# Patient Record
Sex: MALE | Race: WHITE | Employment: UNEMPLOYED | URBAN - METROPOLITAN AREA
[De-identification: names, ages, dates, MRNs, and addresses within clinical notes are randomized per-mention and may not be internally consistent; named-entity substitution may affect disease eponyms.]

---

## 2018-12-09 ENCOUNTER — HOSPITAL ENCOUNTER (INPATIENT)
Age: 58
LOS: 17 days | Discharge: HOME HEALTH | DRG: 253 | End: 2018-12-27
Attending: EMERGENCY MEDICINE | Admitting: INTERNAL MEDICINE
Payer: MEDICARE

## 2018-12-09 DIAGNOSIS — R00.0 TACHYCARDIA: ICD-10-CM

## 2018-12-09 DIAGNOSIS — F15.10 AMPHETAMINE ABUSE (HCC): ICD-10-CM

## 2018-12-09 DIAGNOSIS — L08.9 WOUND INFECTION: Primary | ICD-10-CM

## 2018-12-09 DIAGNOSIS — T14.8XXA WOUND INFECTION: Primary | ICD-10-CM

## 2018-12-09 DIAGNOSIS — R77.8 ELEVATED TROPONIN: ICD-10-CM

## 2018-12-09 DIAGNOSIS — B20 HUMAN IMMUNODEFICIENCY VIRUS (HIV) DISEASE (HCC): ICD-10-CM

## 2018-12-09 LAB
BASOPHILS ABSOLUTE: 0.1 K/UL (ref 0–0.2)
BASOPHILS RELATIVE PERCENT: 1.6 %
EOSINOPHILS ABSOLUTE: 0 K/UL (ref 0–0.6)
EOSINOPHILS RELATIVE PERCENT: 0.6 %
HCT VFR BLD CALC: 35.9 % (ref 40.5–52.5)
HEMOGLOBIN: 11.5 G/DL (ref 13.5–17.5)
LACTIC ACID: 1.7 MMOL/L (ref 0.4–2)
LYMPHOCYTES ABSOLUTE: 1 K/UL (ref 1–5.1)
LYMPHOCYTES RELATIVE PERCENT: 13 %
MCH RBC QN AUTO: 26.4 PG (ref 26–34)
MCHC RBC AUTO-ENTMCNC: 32.1 G/DL (ref 31–36)
MCV RBC AUTO: 82.3 FL (ref 80–100)
MONOCYTES ABSOLUTE: 0.4 K/UL (ref 0–1.3)
MONOCYTES RELATIVE PERCENT: 5.6 %
NEUTROPHILS ABSOLUTE: 6.2 K/UL (ref 1.7–7.7)
NEUTROPHILS RELATIVE PERCENT: 79.2 %
PDW BLD-RTO: 20.1 % (ref 12.4–15.4)
PLATELET # BLD: 202 K/UL (ref 135–450)
PMV BLD AUTO: 7.3 FL (ref 5–10.5)
RBC # BLD: 4.37 M/UL (ref 4.2–5.9)
WBC # BLD: 7.8 K/UL (ref 4–11)

## 2018-12-09 PROCEDURE — 87040 BLOOD CULTURE FOR BACTERIA: CPT

## 2018-12-09 PROCEDURE — 84484 ASSAY OF TROPONIN QUANT: CPT

## 2018-12-09 PROCEDURE — 85025 COMPLETE CBC W/AUTO DIFF WBC: CPT

## 2018-12-09 PROCEDURE — 87086 URINE CULTURE/COLONY COUNT: CPT

## 2018-12-09 PROCEDURE — 83880 ASSAY OF NATRIURETIC PEPTIDE: CPT

## 2018-12-09 PROCEDURE — 96361 HYDRATE IV INFUSION ADD-ON: CPT

## 2018-12-09 PROCEDURE — 83605 ASSAY OF LACTIC ACID: CPT

## 2018-12-09 PROCEDURE — 6370000000 HC RX 637 (ALT 250 FOR IP): Performed by: NURSE PRACTITIONER

## 2018-12-09 PROCEDURE — 93005 ELECTROCARDIOGRAM TRACING: CPT | Performed by: NURSE PRACTITIONER

## 2018-12-09 PROCEDURE — 80053 COMPREHEN METABOLIC PANEL: CPT

## 2018-12-09 PROCEDURE — 2580000003 HC RX 258: Performed by: NURSE PRACTITIONER

## 2018-12-09 PROCEDURE — 99285 EMERGENCY DEPT VISIT HI MDM: CPT

## 2018-12-09 RX ORDER — 0.9 % SODIUM CHLORIDE 0.9 %
1000 INTRAVENOUS SOLUTION INTRAVENOUS ONCE
Status: COMPLETED | OUTPATIENT
Start: 2018-12-10 | End: 2018-12-10

## 2018-12-09 RX ORDER — 0.9 % SODIUM CHLORIDE 0.9 %
1000 INTRAVENOUS SOLUTION INTRAVENOUS ONCE
Status: DISCONTINUED | OUTPATIENT
Start: 2018-12-09 | End: 2018-12-09

## 2018-12-09 RX ORDER — PROMETHAZINE HYDROCHLORIDE 25 MG/1
TABLET ORAL
COMMUNITY
Start: 2018-08-13

## 2018-12-09 RX ORDER — ZOLPIDEM TARTRATE 10 MG/1
10 TABLET ORAL NIGHTLY PRN
Status: ON HOLD | COMMUNITY
Start: 2017-11-01 | End: 2019-02-14 | Stop reason: HOSPADM

## 2018-12-09 RX ORDER — TRAMADOL HYDROCHLORIDE 50 MG/1
50 TABLET ORAL EVERY 6 HOURS PRN
Status: ON HOLD | COMMUNITY
Start: 2017-11-01 | End: 2018-12-27 | Stop reason: HOSPADM

## 2018-12-09 RX ORDER — LOPERAMIDE HYDROCHLORIDE 2 MG/1
CAPSULE ORAL
COMMUNITY
Start: 2018-08-13

## 2018-12-09 RX ORDER — 0.9 % SODIUM CHLORIDE 0.9 %
1000 INTRAVENOUS SOLUTION INTRAVENOUS ONCE
Status: COMPLETED | OUTPATIENT
Start: 2018-12-09 | End: 2018-12-10

## 2018-12-09 RX ORDER — MONTELUKAST SODIUM 10 MG/1
10 TABLET ORAL NIGHTLY
Status: ON HOLD | COMMUNITY
Start: 2017-09-11 | End: 2019-02-14

## 2018-12-09 RX ORDER — NYSTATIN 100000 U/G
CREAM TOPICAL
COMMUNITY
Start: 2018-07-09

## 2018-12-09 RX ORDER — ATORVASTATIN CALCIUM 80 MG/1
TABLET, FILM COATED ORAL
Status: ON HOLD | COMMUNITY
Start: 2018-08-13 | End: 2019-02-14

## 2018-12-09 RX ORDER — SIMETHICONE 125 MG
125 CAPSULE ORAL 4 TIMES DAILY PRN
COMMUNITY
Start: 2017-11-01

## 2018-12-09 RX ORDER — CLONAZEPAM 1 MG/1
1 TABLET ORAL 3 TIMES DAILY PRN
COMMUNITY
Start: 2017-11-01 | End: 2019-02-05

## 2018-12-09 RX ORDER — ABACAVIR AND LAMIVUDINE 600; 300 MG/1; MG/1
1 TABLET, FILM COATED ORAL DAILY
Status: ON HOLD | COMMUNITY
Start: 2018-08-13 | End: 2018-12-27 | Stop reason: HOSPADM

## 2018-12-09 RX ORDER — MIRTAZAPINE 45 MG/1
45 TABLET, FILM COATED ORAL NIGHTLY
Status: ON HOLD | COMMUNITY
Start: 2018-07-30 | End: 2019-02-14

## 2018-12-09 RX ORDER — INSULIN GLARGINE 100 [IU]/ML
12 INJECTION, SOLUTION SUBCUTANEOUS DAILY
Status: ON HOLD | COMMUNITY
Start: 2018-09-28 | End: 2019-02-14

## 2018-12-09 RX ORDER — LIDOCAINE 50 MG/G
OINTMENT TOPICAL
COMMUNITY
Start: 2017-05-01

## 2018-12-09 RX ORDER — PANTOPRAZOLE SODIUM 40 MG/1
40 TABLET, DELAYED RELEASE ORAL DAILY
COMMUNITY
Start: 2017-09-11

## 2018-12-09 RX ORDER — LISINOPRIL 40 MG/1
40 TABLET ORAL
Status: ON HOLD | COMMUNITY
Start: 2017-07-25 | End: 2018-12-27 | Stop reason: HOSPADM

## 2018-12-09 RX ORDER — ALENDRONATE SODIUM 70 MG/1
TABLET ORAL
COMMUNITY
Start: 2018-03-04

## 2018-12-09 RX ORDER — METOPROLOL TARTRATE 100 MG/1
100 TABLET ORAL
Status: ON HOLD | COMMUNITY
Start: 2017-09-11 | End: 2018-12-27 | Stop reason: HOSPADM

## 2018-12-09 RX ORDER — FLUTICASONE PROPIONATE 50 MCG
SPRAY, SUSPENSION (ML) NASAL
Status: ON HOLD | COMMUNITY
Start: 2017-12-05 | End: 2019-02-14

## 2018-12-09 RX ORDER — METOPROLOL TARTRATE 50 MG/1
100 TABLET, FILM COATED ORAL ONCE
Status: COMPLETED | OUTPATIENT
Start: 2018-12-09 | End: 2018-12-09

## 2018-12-09 RX ORDER — ARIPIPRAZOLE 2 MG/1
2 TABLET ORAL NIGHTLY
Status: ON HOLD | COMMUNITY
Start: 2018-07-30 | End: 2019-02-14

## 2018-12-09 RX ORDER — ASPIRIN 81 MG/1
81 TABLET, CHEWABLE ORAL DAILY
Status: ON HOLD | COMMUNITY
Start: 2016-11-08 | End: 2019-02-14

## 2018-12-09 RX ADMIN — METOPROLOL TARTRATE 100 MG: 50 TABLET ORAL at 23:41

## 2018-12-09 RX ADMIN — SODIUM CHLORIDE 1000 ML: 9 INJECTION, SOLUTION INTRAVENOUS at 23:41

## 2018-12-09 ASSESSMENT — ENCOUNTER SYMPTOMS
COLOR CHANGE: 1
SHORTNESS OF BREATH: 0
ABDOMINAL PAIN: 0

## 2018-12-09 ASSESSMENT — PAIN DESCRIPTION - PAIN TYPE: TYPE: ACUTE PAIN

## 2018-12-09 ASSESSMENT — PAIN DESCRIPTION - ORIENTATION: ORIENTATION: LEFT

## 2018-12-09 ASSESSMENT — PAIN DESCRIPTION - LOCATION: LOCATION: LEG

## 2018-12-09 ASSESSMENT — PAIN SCALES - GENERAL: PAINLEVEL_OUTOF10: 4

## 2018-12-10 ENCOUNTER — ANESTHESIA (OUTPATIENT)
Dept: OPERATING ROOM | Age: 58
DRG: 253 | End: 2018-12-10
Payer: MEDICARE

## 2018-12-10 ENCOUNTER — APPOINTMENT (OUTPATIENT)
Dept: GENERAL RADIOLOGY | Age: 58
DRG: 253 | End: 2018-12-10
Payer: MEDICARE

## 2018-12-10 ENCOUNTER — ANESTHESIA EVENT (OUTPATIENT)
Dept: OPERATING ROOM | Age: 58
DRG: 253 | End: 2018-12-10
Payer: MEDICARE

## 2018-12-10 VITALS
RESPIRATION RATE: 17 BRPM | DIASTOLIC BLOOD PRESSURE: 67 MMHG | OXYGEN SATURATION: 96 % | SYSTOLIC BLOOD PRESSURE: 100 MMHG

## 2018-12-10 PROBLEM — S81.802A WOUNDS, MULTIPLE OPEN, LOWER EXTREMITY, LEFT, INITIAL ENCOUNTER: Status: ACTIVE | Noted: 2018-12-10

## 2018-12-10 LAB
A/G RATIO: 0.8 (ref 1.1–2.2)
ALBUMIN SERPL-MCNC: 3 G/DL (ref 3.4–5)
ALBUMIN SERPL-MCNC: 3.1 G/DL (ref 3.4–5)
ALP BLD-CCNC: 119 U/L (ref 40–129)
ALP BLD-CCNC: 119 U/L (ref 40–129)
ALT SERPL-CCNC: 239 U/L (ref 10–40)
ALT SERPL-CCNC: 274 U/L (ref 10–40)
AMPHETAMINE SCREEN, URINE: POSITIVE
ANION GAP SERPL CALCULATED.3IONS-SCNC: 14 MMOL/L (ref 3–16)
ANION GAP SERPL CALCULATED.3IONS-SCNC: 16 MMOL/L (ref 3–16)
AST SERPL-CCNC: 116 U/L (ref 15–37)
AST SERPL-CCNC: 145 U/L (ref 15–37)
BARBITURATE SCREEN URINE: ABNORMAL
BASOPHILS ABSOLUTE: 0 K/UL (ref 0–0.2)
BASOPHILS RELATIVE PERCENT: 0.4 %
BENZODIAZEPINE SCREEN, URINE: ABNORMAL
BILIRUB SERPL-MCNC: 1.7 MG/DL (ref 0–1)
BILIRUB SERPL-MCNC: 1.9 MG/DL (ref 0–1)
BILIRUBIN DIRECT: 1.1 MG/DL (ref 0–0.3)
BILIRUBIN URINE: ABNORMAL
BILIRUBIN, INDIRECT: 0.6 MG/DL (ref 0–1)
BLOOD, URINE: ABNORMAL
BUN BLDV-MCNC: 25 MG/DL (ref 7–20)
BUN BLDV-MCNC: 26 MG/DL (ref 7–20)
CALCIUM SERPL-MCNC: 7.9 MG/DL (ref 8.3–10.6)
CALCIUM SERPL-MCNC: 8.5 MG/DL (ref 8.3–10.6)
CANNABINOID SCREEN URINE: ABNORMAL
CHLORIDE BLD-SCNC: 101 MMOL/L (ref 99–110)
CHLORIDE BLD-SCNC: 98 MMOL/L (ref 99–110)
CLARITY: CLEAR
CO2: 24 MMOL/L (ref 21–32)
CO2: 26 MMOL/L (ref 21–32)
COCAINE METABOLITE SCREEN URINE: ABNORMAL
COLOR: ABNORMAL
COMMENT UA: ABNORMAL
CREAT SERPL-MCNC: 1.1 MG/DL (ref 0.9–1.3)
CREAT SERPL-MCNC: 1.2 MG/DL (ref 0.9–1.3)
EKG ATRIAL RATE: 150 BPM
EKG ATRIAL RATE: 278 BPM
EKG DIAGNOSIS: NORMAL
EKG DIAGNOSIS: NORMAL
EKG Q-T INTERVAL: 330 MS
EKG Q-T INTERVAL: 372 MS
EKG QRS DURATION: 86 MS
EKG QRS DURATION: 94 MS
EKG QTC CALCULATION (BAZETT): 505 MS
EKG QTC CALCULATION (BAZETT): 525 MS
EKG R AXIS: -15 DEGREES
EKG R AXIS: -84 DEGREES
EKG T AXIS: 102 DEGREES
EKG T AXIS: 111 DEGREES
EKG VENTRICULAR RATE: 120 BPM
EKG VENTRICULAR RATE: 141 BPM
EOSINOPHILS ABSOLUTE: 0 K/UL (ref 0–0.6)
EOSINOPHILS RELATIVE PERCENT: 0.4 %
EPITHELIAL CELLS, UA: ABNORMAL /HPF
GFR AFRICAN AMERICAN: >60
GFR AFRICAN AMERICAN: >60
GFR NON-AFRICAN AMERICAN: >60
GFR NON-AFRICAN AMERICAN: >60
GLOBULIN: 3.9 G/DL
GLUCOSE BLD-MCNC: 139 MG/DL (ref 70–99)
GLUCOSE BLD-MCNC: 145 MG/DL (ref 70–99)
GLUCOSE BLD-MCNC: 153 MG/DL (ref 70–99)
GLUCOSE BLD-MCNC: 156 MG/DL (ref 70–99)
GLUCOSE BLD-MCNC: 180 MG/DL (ref 70–99)
GLUCOSE BLD-MCNC: 195 MG/DL (ref 70–99)
GLUCOSE BLD-MCNC: 229 MG/DL (ref 70–99)
GLUCOSE URINE: NEGATIVE MG/DL
HCT VFR BLD CALC: 35.6 % (ref 40.5–52.5)
HEMOGLOBIN: 11.3 G/DL (ref 13.5–17.5)
KETONES, URINE: NEGATIVE MG/DL
LEUKOCYTE ESTERASE, URINE: ABNORMAL
LYMPHOCYTES ABSOLUTE: 0.9 K/UL (ref 1–5.1)
LYMPHOCYTES RELATIVE PERCENT: 11.1 %
Lab: ABNORMAL
MAGNESIUM: 1.7 MG/DL (ref 1.8–2.4)
MCH RBC QN AUTO: 26.4 PG (ref 26–34)
MCHC RBC AUTO-ENTMCNC: 31.8 G/DL (ref 31–36)
MCV RBC AUTO: 83 FL (ref 80–100)
METHADONE SCREEN, URINE: ABNORMAL
MICROSCOPIC EXAMINATION: YES
MONOCYTES ABSOLUTE: 0.5 K/UL (ref 0–1.3)
MONOCYTES RELATIVE PERCENT: 6.1 %
NEUTROPHILS ABSOLUTE: 6.8 K/UL (ref 1.7–7.7)
NEUTROPHILS RELATIVE PERCENT: 82 %
NITRITE, URINE: NEGATIVE
OPIATE SCREEN URINE: ABNORMAL
OXYCODONE URINE: ABNORMAL
PDW BLD-RTO: 20 % (ref 12.4–15.4)
PERFORMED ON: ABNORMAL
PH UA: 6
PH UA: 6
PHENCYCLIDINE SCREEN URINE: ABNORMAL
PHOSPHORUS: 2.4 MG/DL (ref 2.5–4.9)
PLATELET # BLD: 186 K/UL (ref 135–450)
PMV BLD AUTO: 7.3 FL (ref 5–10.5)
POTASSIUM REFLEX MAGNESIUM: 3.6 MMOL/L (ref 3.5–5.1)
POTASSIUM SERPL-SCNC: 3.6 MMOL/L (ref 3.5–5.1)
PRO-BNP: ABNORMAL PG/ML (ref 0–124)
PROPOXYPHENE SCREEN: ABNORMAL
PROTEIN UA: 100 MG/DL
RBC # BLD: 4.3 M/UL (ref 4.2–5.9)
RBC UA: ABNORMAL /HPF (ref 0–2)
SODIUM BLD-SCNC: 138 MMOL/L (ref 136–145)
SODIUM BLD-SCNC: 141 MMOL/L (ref 136–145)
SPECIFIC GRAVITY UA: 1.02
TOTAL PROTEIN: 6.3 G/DL (ref 6.4–8.2)
TOTAL PROTEIN: 7 G/DL (ref 6.4–8.2)
TROPONIN: 0.07 NG/ML
TROPONIN: 0.08 NG/ML
TROPONIN: 0.08 NG/ML
URINE REFLEX TO CULTURE: YES
URINE TYPE: ABNORMAL
UROBILINOGEN, URINE: 2 E.U./DL
WBC # BLD: 8.3 K/UL (ref 4–11)
WBC UA: ABNORMAL /HPF (ref 0–5)

## 2018-12-10 PROCEDURE — 97162 PT EVAL MOD COMPLEX 30 MIN: CPT

## 2018-12-10 PROCEDURE — 3700000000 HC ANESTHESIA ATTENDED CARE: Performed by: SURGERY

## 2018-12-10 PROCEDURE — 99222 1ST HOSP IP/OBS MODERATE 55: CPT | Performed by: NURSE PRACTITIONER

## 2018-12-10 PROCEDURE — 2709999900 HC NON-CHARGEABLE SUPPLY: Performed by: SURGERY

## 2018-12-10 PROCEDURE — 6370000000 HC RX 637 (ALT 250 FOR IP): Performed by: NURSE PRACTITIONER

## 2018-12-10 PROCEDURE — 84484 ASSAY OF TROPONIN QUANT: CPT

## 2018-12-10 PROCEDURE — 2580000003 HC RX 258: Performed by: SURGERY

## 2018-12-10 PROCEDURE — 93010 ELECTROCARDIOGRAM REPORT: CPT | Performed by: INTERNAL MEDICINE

## 2018-12-10 PROCEDURE — 96366 THER/PROPH/DIAG IV INF ADDON: CPT

## 2018-12-10 PROCEDURE — 87205 SMEAR GRAM STAIN: CPT

## 2018-12-10 PROCEDURE — 87076 CULTURE ANAEROBE IDENT EACH: CPT

## 2018-12-10 PROCEDURE — 88304 TISSUE EXAM BY PATHOLOGIST: CPT

## 2018-12-10 PROCEDURE — 96368 THER/DIAG CONCURRENT INF: CPT

## 2018-12-10 PROCEDURE — G8988 SELF CARE GOAL STATUS: HCPCS

## 2018-12-10 PROCEDURE — 6360000002 HC RX W HCPCS: Performed by: INTERNAL MEDICINE

## 2018-12-10 PROCEDURE — 11046 DBRDMT MUSC&/FSCA EA ADDL: CPT | Performed by: SURGERY

## 2018-12-10 PROCEDURE — 6370000000 HC RX 637 (ALT 250 FOR IP): Performed by: INTERNAL MEDICINE

## 2018-12-10 PROCEDURE — 80048 BASIC METABOLIC PNL TOTAL CA: CPT

## 2018-12-10 PROCEDURE — 87176 TISSUE HOMOGENIZATION CULTR: CPT

## 2018-12-10 PROCEDURE — 99223 1ST HOSP IP/OBS HIGH 75: CPT | Performed by: INTERNAL MEDICINE

## 2018-12-10 PROCEDURE — 81001 URINALYSIS AUTO W/SCOPE: CPT

## 2018-12-10 PROCEDURE — 2580000003 HC RX 258: Performed by: INTERNAL MEDICINE

## 2018-12-10 PROCEDURE — 80076 HEPATIC FUNCTION PANEL: CPT

## 2018-12-10 PROCEDURE — 97166 OT EVAL MOD COMPLEX 45 MIN: CPT

## 2018-12-10 PROCEDURE — 11043 DBRDMT MUSC&/FSCA 1ST 20/<: CPT | Performed by: SURGERY

## 2018-12-10 PROCEDURE — 3700000001 HC ADD 15 MINUTES (ANESTHESIA): Performed by: SURGERY

## 2018-12-10 PROCEDURE — 85025 COMPLETE CBC W/AUTO DIFF WBC: CPT

## 2018-12-10 PROCEDURE — 87070 CULTURE OTHR SPECIMN AEROBIC: CPT

## 2018-12-10 PROCEDURE — 6360000002 HC RX W HCPCS

## 2018-12-10 PROCEDURE — 73590 X-RAY EXAM OF LOWER LEG: CPT

## 2018-12-10 PROCEDURE — 2580000003 HC RX 258

## 2018-12-10 PROCEDURE — 87186 SC STD MICRODIL/AGAR DIL: CPT

## 2018-12-10 PROCEDURE — 94760 N-INVAS EAR/PLS OXIMETRY 1: CPT

## 2018-12-10 PROCEDURE — 0LBP0ZZ EXCISION OF LEFT LOWER LEG TENDON, OPEN APPROACH: ICD-10-PCS | Performed by: SURGERY

## 2018-12-10 PROCEDURE — 93005 ELECTROCARDIOGRAM TRACING: CPT | Performed by: INTERNAL MEDICINE

## 2018-12-10 PROCEDURE — G8978 MOBILITY CURRENT STATUS: HCPCS

## 2018-12-10 PROCEDURE — APPNB30 APP NON BILLABLE TIME 0-30 MINS: Performed by: NURSE PRACTITIONER

## 2018-12-10 PROCEDURE — 83735 ASSAY OF MAGNESIUM: CPT

## 2018-12-10 PROCEDURE — 2580000003 HC RX 258: Performed by: NURSE PRACTITIONER

## 2018-12-10 PROCEDURE — 6360000002 HC RX W HCPCS: Performed by: NURSE PRACTITIONER

## 2018-12-10 PROCEDURE — 7100000000 HC PACU RECOVERY - FIRST 15 MIN: Performed by: SURGERY

## 2018-12-10 PROCEDURE — 7100000001 HC PACU RECOVERY - ADDTL 15 MIN: Performed by: SURGERY

## 2018-12-10 PROCEDURE — 36415 COLL VENOUS BLD VENIPUNCTURE: CPT

## 2018-12-10 PROCEDURE — 86403 PARTICLE AGGLUT ANTBDY SCRN: CPT

## 2018-12-10 PROCEDURE — 97530 THERAPEUTIC ACTIVITIES: CPT

## 2018-12-10 PROCEDURE — 80307 DRUG TEST PRSMV CHEM ANLYZR: CPT

## 2018-12-10 PROCEDURE — G8987 SELF CARE CURRENT STATUS: HCPCS

## 2018-12-10 PROCEDURE — G8979 MOBILITY GOAL STATUS: HCPCS

## 2018-12-10 PROCEDURE — 3600000012 HC SURGERY LEVEL 2 ADDTL 15MIN: Performed by: SURGERY

## 2018-12-10 PROCEDURE — 96365 THER/PROPH/DIAG IV INF INIT: CPT

## 2018-12-10 PROCEDURE — 2500000003 HC RX 250 WO HCPCS

## 2018-12-10 PROCEDURE — 87077 CULTURE AEROBIC IDENTIFY: CPT

## 2018-12-10 PROCEDURE — 1200000000 HC SEMI PRIVATE

## 2018-12-10 PROCEDURE — 84100 ASSAY OF PHOSPHORUS: CPT

## 2018-12-10 PROCEDURE — 97116 GAIT TRAINING THERAPY: CPT

## 2018-12-10 PROCEDURE — 3600000002 HC SURGERY LEVEL 2 BASE: Performed by: SURGERY

## 2018-12-10 RX ORDER — ASPIRIN 81 MG/1
324 TABLET, CHEWABLE ORAL ONCE
Status: COMPLETED | OUTPATIENT
Start: 2018-12-10 | End: 2018-12-10

## 2018-12-10 RX ORDER — ONDANSETRON 2 MG/ML
4 INJECTION INTRAMUSCULAR; INTRAVENOUS
Status: DISCONTINUED | OUTPATIENT
Start: 2018-12-10 | End: 2018-12-10 | Stop reason: HOSPADM

## 2018-12-10 RX ORDER — ETOMIDATE 2 MG/ML
INJECTION INTRAVENOUS PRN
Status: DISCONTINUED | OUTPATIENT
Start: 2018-12-10 | End: 2018-12-10 | Stop reason: SDUPTHER

## 2018-12-10 RX ORDER — DEXTROSE MONOHYDRATE 25 G/50ML
12.5 INJECTION, SOLUTION INTRAVENOUS PRN
Status: DISCONTINUED | OUTPATIENT
Start: 2018-12-10 | End: 2018-12-19

## 2018-12-10 RX ORDER — MAGNESIUM SULFATE 1 G/100ML
1 INJECTION INTRAVENOUS ONCE
Status: COMPLETED | OUTPATIENT
Start: 2018-12-10 | End: 2018-12-10

## 2018-12-10 RX ORDER — OXYCODONE HYDROCHLORIDE AND ACETAMINOPHEN 5; 325 MG/1; MG/1
2 TABLET ORAL ONCE
Status: COMPLETED | OUTPATIENT
Start: 2018-12-10 | End: 2018-12-10

## 2018-12-10 RX ORDER — KETAMINE HCL IN NACL, ISO-OSM 100MG/10ML
SYRINGE (ML) INJECTION PRN
Status: DISCONTINUED | OUTPATIENT
Start: 2018-12-10 | End: 2018-12-10 | Stop reason: SDUPTHER

## 2018-12-10 RX ORDER — OXYCODONE HYDROCHLORIDE AND ACETAMINOPHEN 5; 325 MG/1; MG/1
2 TABLET ORAL PRN
Status: DISCONTINUED | OUTPATIENT
Start: 2018-12-10 | End: 2018-12-10 | Stop reason: HOSPADM

## 2018-12-10 RX ORDER — SODIUM CHLORIDE 0.9 % (FLUSH) 0.9 %
10 SYRINGE (ML) INJECTION EVERY 12 HOURS SCHEDULED
Status: CANCELLED | OUTPATIENT
Start: 2018-12-10

## 2018-12-10 RX ORDER — ATORVASTATIN CALCIUM 80 MG/1
80 TABLET, FILM COATED ORAL DAILY
Status: DISCONTINUED | OUTPATIENT
Start: 2018-12-10 | End: 2018-12-27 | Stop reason: HOSPADM

## 2018-12-10 RX ORDER — MEPERIDINE HYDROCHLORIDE 25 MG/ML
12.5 INJECTION INTRAMUSCULAR; INTRAVENOUS; SUBCUTANEOUS EVERY 5 MIN PRN
Status: DISCONTINUED | OUTPATIENT
Start: 2018-12-10 | End: 2018-12-10 | Stop reason: HOSPADM

## 2018-12-10 RX ORDER — FENTANYL CITRATE 50 UG/ML
25 INJECTION, SOLUTION INTRAMUSCULAR; INTRAVENOUS EVERY 5 MIN PRN
Status: DISCONTINUED | OUTPATIENT
Start: 2018-12-10 | End: 2018-12-10 | Stop reason: HOSPADM

## 2018-12-10 RX ORDER — SODIUM CHLORIDE 9 MG/ML
INJECTION, SOLUTION INTRAVENOUS CONTINUOUS PRN
Status: DISCONTINUED | OUTPATIENT
Start: 2018-12-10 | End: 2018-12-10 | Stop reason: SDUPTHER

## 2018-12-10 RX ORDER — OXYCODONE HYDROCHLORIDE AND ACETAMINOPHEN 5; 325 MG/1; MG/1
1 TABLET ORAL PRN
Status: DISCONTINUED | OUTPATIENT
Start: 2018-12-10 | End: 2018-12-10 | Stop reason: HOSPADM

## 2018-12-10 RX ORDER — SUCCINYLCHOLINE CHLORIDE 20 MG/ML
INJECTION INTRAMUSCULAR; INTRAVENOUS PRN
Status: DISCONTINUED | OUTPATIENT
Start: 2018-12-10 | End: 2018-12-10 | Stop reason: SDUPTHER

## 2018-12-10 RX ORDER — DEXTROSE MONOHYDRATE 50 MG/ML
100 INJECTION, SOLUTION INTRAVENOUS PRN
Status: DISCONTINUED | OUTPATIENT
Start: 2018-12-10 | End: 2018-12-19

## 2018-12-10 RX ORDER — ASPIRIN 81 MG/1
81 TABLET, CHEWABLE ORAL DAILY
Status: DISCONTINUED | OUTPATIENT
Start: 2018-12-10 | End: 2018-12-21

## 2018-12-10 RX ORDER — MORPHINE SULFATE 2 MG/ML
1 INJECTION, SOLUTION INTRAMUSCULAR; INTRAVENOUS EVERY 5 MIN PRN
Status: DISCONTINUED | OUTPATIENT
Start: 2018-12-10 | End: 2018-12-10 | Stop reason: HOSPADM

## 2018-12-10 RX ORDER — NICOTINE POLACRILEX 4 MG
15 LOZENGE BUCCAL PRN
Status: DISCONTINUED | OUTPATIENT
Start: 2018-12-10 | End: 2018-12-19

## 2018-12-10 RX ORDER — IBUPROFEN 200 MG
1 CAPSULE ORAL 2 TIMES DAILY
Status: ON HOLD | COMMUNITY
End: 2019-02-14

## 2018-12-10 RX ORDER — MORPHINE SULFATE 2 MG/ML
2 INJECTION, SOLUTION INTRAMUSCULAR; INTRAVENOUS EVERY 5 MIN PRN
Status: DISCONTINUED | OUTPATIENT
Start: 2018-12-10 | End: 2018-12-10 | Stop reason: HOSPADM

## 2018-12-10 RX ORDER — POTASSIUM CHLORIDE 20 MEQ/1
20 TABLET, EXTENDED RELEASE ORAL 2 TIMES DAILY
Status: ON HOLD | COMMUNITY
End: 2019-02-14

## 2018-12-10 RX ORDER — SODIUM CHLORIDE 0.9 % (FLUSH) 0.9 %
10 SYRINGE (ML) INJECTION PRN
Status: DISCONTINUED | OUTPATIENT
Start: 2018-12-10 | End: 2018-12-19 | Stop reason: SDUPTHER

## 2018-12-10 RX ORDER — PROPOFOL 10 MG/ML
INJECTION, EMULSION INTRAVENOUS PRN
Status: DISCONTINUED | OUTPATIENT
Start: 2018-12-10 | End: 2018-12-10

## 2018-12-10 RX ORDER — LAMIVUDINE 150 MG/1
300 TABLET, FILM COATED ORAL DAILY
Status: DISCONTINUED | OUTPATIENT
Start: 2018-12-10 | End: 2018-12-18

## 2018-12-10 RX ORDER — INSULIN GLARGINE 100 [IU]/ML
12 INJECTION, SOLUTION SUBCUTANEOUS NIGHTLY
Status: DISCONTINUED | OUTPATIENT
Start: 2018-12-10 | End: 2018-12-14

## 2018-12-10 RX ORDER — SODIUM CHLORIDE 9 MG/ML
INJECTION, SOLUTION INTRAVENOUS CONTINUOUS
Status: CANCELLED | OUTPATIENT
Start: 2018-12-10

## 2018-12-10 RX ORDER — ABACAVIR 300 MG/1
600 TABLET ORAL DAILY
Status: DISCONTINUED | OUTPATIENT
Start: 2018-12-10 | End: 2018-12-20

## 2018-12-10 RX ORDER — LIDOCAINE HYDROCHLORIDE 20 MG/ML
INJECTION, SOLUTION INFILTRATION; PERINEURAL PRN
Status: DISCONTINUED | OUTPATIENT
Start: 2018-12-10 | End: 2018-12-10 | Stop reason: SDUPTHER

## 2018-12-10 RX ORDER — LISINOPRIL 20 MG/1
40 TABLET ORAL DAILY
Status: DISCONTINUED | OUTPATIENT
Start: 2018-12-10 | End: 2018-12-13

## 2018-12-10 RX ORDER — SODIUM CHLORIDE 0.9 % (FLUSH) 0.9 %
10 SYRINGE (ML) INJECTION PRN
Status: CANCELLED | OUTPATIENT
Start: 2018-12-10

## 2018-12-10 RX ORDER — ABACAVIR AND LAMIVUDINE 600; 300 MG/1; MG/1
1 TABLET, FILM COATED ORAL DAILY
Status: DISCONTINUED | OUTPATIENT
Start: 2018-12-10 | End: 2018-12-10

## 2018-12-10 RX ORDER — METOPROLOL TARTRATE 50 MG/1
100 TABLET, FILM COATED ORAL 2 TIMES DAILY
Status: DISCONTINUED | OUTPATIENT
Start: 2018-12-10 | End: 2018-12-11

## 2018-12-10 RX ORDER — PANTOPRAZOLE SODIUM 40 MG/1
40 TABLET, DELAYED RELEASE ORAL
Status: DISCONTINUED | OUTPATIENT
Start: 2018-12-10 | End: 2018-12-27 | Stop reason: HOSPADM

## 2018-12-10 RX ORDER — MAGNESIUM HYDROXIDE 1200 MG/15ML
LIQUID ORAL CONTINUOUS PRN
Status: COMPLETED | OUTPATIENT
Start: 2018-12-10 | End: 2018-12-10

## 2018-12-10 RX ORDER — FENTANYL CITRATE 50 UG/ML
INJECTION, SOLUTION INTRAMUSCULAR; INTRAVENOUS PRN
Status: DISCONTINUED | OUTPATIENT
Start: 2018-12-10 | End: 2018-12-10 | Stop reason: SDUPTHER

## 2018-12-10 RX ORDER — SODIUM CHLORIDE 0.9 % (FLUSH) 0.9 %
10 SYRINGE (ML) INJECTION EVERY 12 HOURS SCHEDULED
Status: DISCONTINUED | OUTPATIENT
Start: 2018-12-10 | End: 2018-12-19 | Stop reason: SDUPTHER

## 2018-12-10 RX ORDER — FENTANYL CITRATE 50 UG/ML
50 INJECTION, SOLUTION INTRAMUSCULAR; INTRAVENOUS EVERY 5 MIN PRN
Status: DISCONTINUED | OUTPATIENT
Start: 2018-12-10 | End: 2018-12-10 | Stop reason: HOSPADM

## 2018-12-10 RX ORDER — MIDAZOLAM HYDROCHLORIDE 1 MG/ML
INJECTION INTRAMUSCULAR; INTRAVENOUS PRN
Status: DISCONTINUED | OUTPATIENT
Start: 2018-12-10 | End: 2018-12-10 | Stop reason: SDUPTHER

## 2018-12-10 RX ORDER — FUROSEMIDE 40 MG/1
40 TABLET ORAL 2 TIMES DAILY
Status: ON HOLD | COMMUNITY
End: 2018-12-27

## 2018-12-10 RX ORDER — MONTELUKAST SODIUM 10 MG/1
10 TABLET ORAL NIGHTLY
Status: DISCONTINUED | OUTPATIENT
Start: 2018-12-10 | End: 2018-12-27 | Stop reason: HOSPADM

## 2018-12-10 RX ADMIN — MONTELUKAST SODIUM 10 MG: 10 TABLET, COATED ORAL at 22:09

## 2018-12-10 RX ADMIN — Medication 1250 MG: at 00:30

## 2018-12-10 RX ADMIN — PHENYLEPHRINE HYDROCHLORIDE 100 MCG: 10 INJECTION, SOLUTION INTRAMUSCULAR; INTRAVENOUS; SUBCUTANEOUS at 16:08

## 2018-12-10 RX ADMIN — PHENYLEPHRINE HYDROCHLORIDE 100 MCG: 10 INJECTION, SOLUTION INTRAMUSCULAR; INTRAVENOUS; SUBCUTANEOUS at 15:51

## 2018-12-10 RX ADMIN — RALTEGRAVIR 400 MG: 400 TABLET, FILM COATED ORAL at 22:53

## 2018-12-10 RX ADMIN — PHENYLEPHRINE HYDROCHLORIDE 100 MCG: 10 INJECTION, SOLUTION INTRAMUSCULAR; INTRAVENOUS; SUBCUTANEOUS at 16:06

## 2018-12-10 RX ADMIN — ATORVASTATIN CALCIUM 80 MG: 80 TABLET, FILM COATED ORAL at 11:38

## 2018-12-10 RX ADMIN — MIDAZOLAM 2 MG: 1 INJECTION INTRAMUSCULAR; INTRAVENOUS at 15:27

## 2018-12-10 RX ADMIN — METOPROLOL TARTRATE 100 MG: 50 TABLET ORAL at 22:09

## 2018-12-10 RX ADMIN — LISINOPRIL 40 MG: 20 TABLET ORAL at 10:23

## 2018-12-10 RX ADMIN — ABACAVIR 600 MG: 300 TABLET, FILM COATED ORAL at 11:38

## 2018-12-10 RX ADMIN — ASPIRIN 81 MG 81 MG: 81 TABLET ORAL at 10:23

## 2018-12-10 RX ADMIN — LIDOCAINE HYDROCHLORIDE 60 MG: 20 INJECTION, SOLUTION INFILTRATION; PERINEURAL at 15:41

## 2018-12-10 RX ADMIN — PHENYLEPHRINE HYDROCHLORIDE 200 MCG: 10 INJECTION, SOLUTION INTRAMUSCULAR; INTRAVENOUS; SUBCUTANEOUS at 16:20

## 2018-12-10 RX ADMIN — Medication 10 ML: at 22:57

## 2018-12-10 RX ADMIN — PIPERACILLIN SODIUM,TAZOBACTAM SODIUM 3.38 G: 3; .375 INJECTION, POWDER, FOR SOLUTION INTRAVENOUS at 15:50

## 2018-12-10 RX ADMIN — SODIUM CHLORIDE: 9 INJECTION, SOLUTION INTRAVENOUS at 15:27

## 2018-12-10 RX ADMIN — PIPERACILLIN SODIUM,TAZOBACTAM SODIUM 4.5 G: 4; .5 INJECTION, POWDER, FOR SOLUTION INTRAVENOUS at 01:00

## 2018-12-10 RX ADMIN — ENOXAPARIN SODIUM 40 MG: 40 INJECTION SUBCUTANEOUS at 10:23

## 2018-12-10 RX ADMIN — FENTANYL CITRATE 100 MCG: 50 INJECTION INTRAMUSCULAR; INTRAVENOUS at 15:41

## 2018-12-10 RX ADMIN — PANTOPRAZOLE SODIUM 40 MG: 40 TABLET, DELAYED RELEASE ORAL at 07:04

## 2018-12-10 RX ADMIN — LAMIVUDINE 300 MG: 150 TABLET, FILM COATED ORAL at 11:38

## 2018-12-10 RX ADMIN — Medication 60 MG: at 15:41

## 2018-12-10 RX ADMIN — ETOMIDATE 10 MG: 2 INJECTION INTRAVENOUS at 15:41

## 2018-12-10 RX ADMIN — ASPIRIN 81 MG 324 MG: 81 TABLET ORAL at 01:00

## 2018-12-10 RX ADMIN — MAGNESIUM SULFATE HEPTAHYDRATE 1 G: 1 INJECTION, SOLUTION INTRAVENOUS at 11:48

## 2018-12-10 RX ADMIN — PIPERACILLIN SODIUM,TAZOBACTAM SODIUM 3.38 G: 3; .375 INJECTION, POWDER, FOR SOLUTION INTRAVENOUS at 10:24

## 2018-12-10 RX ADMIN — SUCCINYLCHOLINE CHLORIDE 100 MG: 20 INJECTION, SOLUTION INTRAMUSCULAR; INTRAVENOUS at 15:41

## 2018-12-10 RX ADMIN — INSULIN GLARGINE 12 UNITS: 100 INJECTION, SOLUTION SUBCUTANEOUS at 22:53

## 2018-12-10 RX ADMIN — METOPROLOL TARTRATE 100 MG: 50 TABLET ORAL at 10:27

## 2018-12-10 RX ADMIN — OXYCODONE AND ACETAMINOPHEN 2 TABLET: 5; 325 TABLET ORAL at 22:53

## 2018-12-10 RX ADMIN — PHENYLEPHRINE HYDROCHLORIDE 100 MCG: 10 INJECTION, SOLUTION INTRAMUSCULAR; INTRAVENOUS; SUBCUTANEOUS at 15:56

## 2018-12-10 RX ADMIN — RALTEGRAVIR 400 MG: 400 TABLET, FILM COATED ORAL at 11:38

## 2018-12-10 RX ADMIN — SODIUM CHLORIDE 1000 ML: 9 INJECTION, SOLUTION INTRAVENOUS at 00:08

## 2018-12-10 RX ADMIN — Medication 10 ML: at 10:29

## 2018-12-10 ASSESSMENT — PULMONARY FUNCTION TESTS
PIF_VALUE: 16
PIF_VALUE: 15
PIF_VALUE: 0
PIF_VALUE: 1
PIF_VALUE: 15
PIF_VALUE: 16
PIF_VALUE: 18
PIF_VALUE: 15
PIF_VALUE: 18
PIF_VALUE: 21
PIF_VALUE: 19
PIF_VALUE: 15
PIF_VALUE: 7
PIF_VALUE: 8
PIF_VALUE: 17
PIF_VALUE: 0
PIF_VALUE: 15
PIF_VALUE: 0
PIF_VALUE: 10
PIF_VALUE: 0
PIF_VALUE: 6
PIF_VALUE: 17
PIF_VALUE: 15
PIF_VALUE: 15
PIF_VALUE: 18
PIF_VALUE: 3
PIF_VALUE: 18
PIF_VALUE: 1
PIF_VALUE: 0
PIF_VALUE: 0
PIF_VALUE: 2
PIF_VALUE: 0
PIF_VALUE: 15
PIF_VALUE: 23
PIF_VALUE: 1
PIF_VALUE: 15
PIF_VALUE: 1
PIF_VALUE: 6
PIF_VALUE: 18
PIF_VALUE: 1
PIF_VALUE: 15
PIF_VALUE: 2
PIF_VALUE: 0
PIF_VALUE: 15
PIF_VALUE: 18
PIF_VALUE: 14
PIF_VALUE: 17
PIF_VALUE: 0
PIF_VALUE: 16
PIF_VALUE: 0
PIF_VALUE: 15
PIF_VALUE: 3
PIF_VALUE: 16
PIF_VALUE: 28
PIF_VALUE: 18
PIF_VALUE: 14
PIF_VALUE: 6
PIF_VALUE: 15
PIF_VALUE: 14
PIF_VALUE: 17

## 2018-12-10 ASSESSMENT — PAIN SCALES - GENERAL
PAINLEVEL_OUTOF10: 0
PAINLEVEL_OUTOF10: 4
PAINLEVEL_OUTOF10: 0
PAINLEVEL_OUTOF10: 4
PAINLEVEL_OUTOF10: 4
PAINLEVEL_OUTOF10: 0
PAINLEVEL_OUTOF10: 3
PAINLEVEL_OUTOF10: 7
PAINLEVEL_OUTOF10: 4
PAINLEVEL_OUTOF10: 4

## 2018-12-10 ASSESSMENT — PAIN DESCRIPTION - ONSET
ONSET: ON-GOING

## 2018-12-10 ASSESSMENT — PAIN DESCRIPTION - LOCATION
LOCATION: LEG

## 2018-12-10 ASSESSMENT — PAIN DESCRIPTION - PAIN TYPE
TYPE: SURGICAL PAIN
TYPE: SURGICAL PAIN
TYPE: ACUTE PAIN

## 2018-12-10 ASSESSMENT — PAIN DESCRIPTION - DESCRIPTORS
DESCRIPTORS: ACHING
DESCRIPTORS: ACHING;CONSTANT
DESCRIPTORS: ACHING;DISCOMFORT;SORE
DESCRIPTORS: ACHING
DESCRIPTORS: ACHING;CONSTANT

## 2018-12-10 ASSESSMENT — PAIN DESCRIPTION - ORIENTATION
ORIENTATION: LEFT

## 2018-12-10 ASSESSMENT — PAIN DESCRIPTION - PROGRESSION
CLINICAL_PROGRESSION: NOT CHANGED
CLINICAL_PROGRESSION: GRADUALLY WORSENING

## 2018-12-10 ASSESSMENT — PAIN SCALES - WONG BAKER: WONGBAKER_NUMERICALRESPONSE: 2

## 2018-12-10 ASSESSMENT — PAIN DESCRIPTION - FREQUENCY
FREQUENCY: CONTINUOUS

## 2018-12-10 ASSESSMENT — LIFESTYLE VARIABLES: SMOKING_STATUS: 0

## 2018-12-10 ASSESSMENT — ENCOUNTER SYMPTOMS: SHORTNESS OF BREATH: 1

## 2018-12-10 NOTE — ED NOTES
Bed: B-10  Expected date:   Expected time:   Means of arrival:   Comments:  MEDIC 10 [facility], RN  12/09/18 0466

## 2018-12-10 NOTE — ANESTHESIA PRE PROCEDURE
Department of Anesthesiology  Preprocedure Note       Name:  Mahendra Rodriguez   Age:  62 y.o.  :  1960                                          MRN:  [id number]         Date:  12/10/2018      Surgeon: Analisa Muñoz):  Gail Cisneros MD    Procedure: INCISION AND DRAINAGE LEFT LEG WOUND (Left )    Medications prior to admission:   Prior to Admission medications    Medication Sig Start Date End Date Taking? Authorizing Provider   furosemide (LASIX) 40 MG tablet Take 40 mg by mouth 2 times daily   Yes Historical Provider, MD   potassium chloride (KLOR-CON M) 20 MEQ extended release tablet Take 20 mEq by mouth 2 times daily   Yes Historical Provider, MD   calcium carbonate (OYSTER SHELL CALCIUM 500 MG) 1250 (500 Ca) MG tablet Take 1 tablet by mouth 2 times daily   Yes Historical Provider, MD   vitamin D (CHOLECALCIFEROL) 1000 units TABS tablet Take 1,000 Units by mouth daily   Yes Historical Provider, MD   abacavir-lamivudine (EPZICOM) 600-300 MG per tablet Take 1 tablet by mouth daily  18  Yes Historical Provider, MD   alendronate (FOSAMAX) 70 MG tablet TAKE 1 TABLET BY MOUTH ONCE WEEKLY BEFORE BREAKFAST, ON AN EMPTY STOMACH: REMAIN UPRIGHT FOR 30 MINUTES 3/4/18  Yes Historical Provider, MD   ARIPiprazole (ABILIFY) 2 MG tablet Take 2 mg by mouth nightly  18  Yes Historical Provider, MD   aspirin 81 MG chewable tablet Take 81 mg by mouth daily  16  Yes Historical Provider, MD   atorvastatin (LIPITOR) 80 MG tablet TAKE 1 TABLET BY MOUTH DAILY 18  Yes Historical Provider, MD   clonazePAM (KLONOPIN) 1 MG tablet Take 1 mg by mouth 3 times daily as needed. . 17  Yes Historical Provider, MD   fluticasone (FLONASE) 50 MCG/ACT nasal spray PLACE ONE SPRAY IN EACH NOSTRIL ONCE DAILY 17  Yes Historical Provider, MD   insulin aspart (NOVOLOG) 100 UNIT/ML injection pen inject up 4 units before meals with a 1 unit per 50 mg/dL >150 mg/dL.   Max daily dose 50 units 17  Yes Historical Provider, MD 80 mg Oral Daily Mera Bunch MD   80 mg at 12/10/18 1138    aspirin chewable tablet 81 mg  81 mg Oral Daily Mera Bunch MD   81 mg at 12/10/18 1023    insulin glargine (LANTUS) injection vial 12 Units  12 Units Subcutaneous Nightly Mera Bunch MD        lisinopril (PRINIVIL;ZESTRIL) tablet 40 mg  40 mg Oral Daily Mera Bunch MD   40 mg at 12/10/18 1023    metoprolol tartrate (LOPRESSOR) tablet 100 mg  100 mg Oral BID Mera Bunch MD   100 mg at 12/10/18 1027    montelukast (SINGULAIR) tablet 10 mg  10 mg Oral Nightly Mera Bunch MD        pantoprazole (PROTONIX) tablet 40 mg  40 mg Oral QAM AC Mera Bunch MD   40 mg at 12/10/18 8837    raltegravir (ISENTRESS) tablet 400 mg  400 mg Oral BID Mera Bunch MD   400 mg at 12/10/18 1138    sodium chloride flush 0.9 % injection 10 mL  10 mL Intravenous 2 times per day Mera Bunch MD   10 mL at 12/10/18 1029    sodium chloride flush 0.9 % injection 10 mL  10 mL Intravenous PRN Mera Bunch MD        enoxaparin (LOVENOX) injection 40 mg  40 mg Subcutaneous Daily Mera Bunch MD   40 mg at 12/10/18 1023    piperacillin-tazobactam (ZOSYN) 3.375 g in dextrose 5 % 100 mL IVPB extended infusion (mini-bag)  3.375 g Intravenous Q8H Mera Bunch MD   Stopped at 12/10/18 1423    abacavir (ZIAGEN) tablet 600 mg  600 mg Oral Daily Mera Bunch MD   600 mg at 12/10/18 1138    lamiVUDine (EPIVIR) tablet 300 mg  300 mg Oral Daily Mera Bunch MD   300 mg at 12/10/18 1138    vancomycin (VANCOCIN) intermittent dosing (placeholder)   Other RX Placeholder Mera Bunch MD        glucose (GLUTOSE) 40 % oral gel 15 g  15 g Oral PRN Mera Bunch MD        dextrose 50 % solution 12.5 g  12.5 g Intravenous PRN Mera Bunch MD        glucagon (rDNA) injection 1 mg  1 mg Intramuscular PRN Mera Bunch MD        dextrose 5 % solution  100 mL/hr Intravenous PRN Mera Bunch MD        [START ON 12/11/2018] influenza quadrivalent split vaccine

## 2018-12-10 NOTE — PROGRESS NOTES
Pt being transported off floor to OR.   Electronically signed by Branden Patel RN on 12/10/2018 at 2:59 PM

## 2018-12-10 NOTE — CONSULTS
Take 10 mg by mouth nightly as needed. .         Allergies:  Patient has no known allergies. Immunizations : There is no immunization history for the selected administration types on file for this patient. Social History:   Social History   Substance Use Topics    Smoking status: Never Smoker    Smokeless tobacco: Never Used    Alcohol use No     History   Smoking Status    Never Smoker   Smokeless Tobacco    Never Used      Family History :    No DVT no COPD     REVIEW OF SYSTEMS:    No fever / chills / sweats. No weight loss. No visual change, eye pain, eye discharge. No oral lesion, sore throat, dysphagia. Denies cough / sputum/Sob   Denies chest pain, palpitations/ dizziness  Denies nausea/ vomiting/abdominal pain/diarrhea. Denies dysuria or change in urinary function. Denies joint swelling or pain. No myalgia, arthralgia. No rashes, skin lesions   Denies focal weakness, sensory change or other neurologic symptoms  No lymph node swelling or tenderness.     Left leg deep ulcer and drainage and pain+ skin discolor     PHYSICAL EXAM:      Vitals:    /79   Pulse 64   Temp 97.9 °F (36.6 °C) (Oral)   Resp 16   Ht 6' 2\" (1.88 m)   Wt 171 lb 4.8 oz (77.7 kg)   SpO2 98%   BMI 21.99 kg/m²     General Appearance: alert,in no acute distress, +  pallor, no icterus chronically ill appearing man + poor dentition    Skin: warm and dry, no rash or erythema  Head: normocephalic and atraumatic  Eyes: pupils equal, round, and reactive to light, conjunctivae normal  ENT: tympanic membrane, external ear and ear canal normal bilaterally, nose without deformity, nasal mucosa and turbinates normal without polyps  Neck: supple and non-tender without mass, no thyromegaly  no cervical lymphadenopathy  Pulmonary/Chest: clear to auscultation bilaterally- no wheezes, rales or rhonchi, normal air movement, no respiratory distress  Cardiovascular: normal rate, regular rhythm, normal S1 and S2, no murmurs, rubs, clicks, or gallops, no carotid bruits  Abdomen: soft, non-tender, non-distended, normal bowel sounds, no masses or organomegaly  Extremities: no cyanosis, clubbing or + edema  Musculoskeletal: normal range of motion, no joint swelling, deformity or tenderness  Neurologic: reflexes normal and symmetric, no cranial nerve deficit  Psych:  Orientation, sensorium, mood normal  Lines:  IV  Left leg deep ulcer with slough+ local redness and pain+ skin changes and  Odor+  Rt leg punched out ulcers on the leg tibial area          DATA:    Lab Results   Component Value Date    WBC 8.3 12/10/2018    HGB 11.3 (L) 12/10/2018    HCT 35.6 (L) 12/10/2018    MCV 83.0 12/10/2018     12/10/2018     Lab Results   Component Value Date    CREATININE 1.2 12/10/2018    BUN 26 (H) 12/10/2018     12/10/2018    K 3.6 12/10/2018     12/10/2018    CO2 24 12/10/2018       Hepatic Function Panel:   Lab Results   Component Value Date    ALKPHOS 119 12/10/2018     12/10/2018     12/10/2018    PROT 6.3 12/10/2018    BILITOT 1.7 12/10/2018    BILIDIR 1.1 12/10/2018    IBILI 0.6 12/10/2018    LABALBU 3.0 12/10/2018     UA:  Lab Results   Component Value Date    COLORU DK YELLOW 12/10/2018    CLARITYU Clear 12/10/2018    GLUCOSEU Negative 12/10/2018    BILIRUBINUR SMALL 12/10/2018    KETUA Negative 12/10/2018    SPECGRAV 1.023 12/10/2018    BLOODU MODERATE 12/10/2018    PHUR 6.0 12/10/2018    PROTEINU 100 12/10/2018    UROBILINOGEN 2.0 12/10/2018    NITRU Negative 12/10/2018    LEUKOCYTESUR TRACE 12/10/2018    LABMICR YES 12/10/2018    URINETYPE Not Specified 12/10/2018      Urine Microscopic:   Lab Results   Component Value Date    COMU see below 12/10/2018    WBCUA 0-2 12/10/2018    RBCUA 5-10 12/10/2018    EPIU 0-2 12/10/2018         Scheduled Meds:   atorvastatin  80 mg Oral Daily    aspirin  81 mg Oral Daily    insulin glargine  12 Units Subcutaneous Nightly    lisinopril  40 mg Oral Daily    metoprolol 100 mg Oral BID    montelukast  10 mg Oral Nightly    pantoprazole  40 mg Oral QAM AC    raltegravir  400 mg Oral BID    sodium chloride flush  10 mL Intravenous 2 times per day    enoxaparin  40 mg Subcutaneous Daily    piperacillin-tazobactam  3.375 g Intravenous Q8H    abacavir  600 mg Oral Daily    lamiVUDine  300 mg Oral Daily    vancomycin  1,000 mg Intravenous Q12H    vancomycin (VANCOCIN) intermittent dosing (placeholder)   Other RX Placeholder    [START ON 12/11/2018] influenza virus vaccine  0.5 mL Intramuscular Once    magnesium sulfate  1 g Intravenous Once       Continuous Infusions:   dextrose         PRN Meds:  sodium chloride flush, glucose, dextrose, glucagon (rDNA), dextrose      cd4  :  763     HIV viral load  11/2017 undetected    BNP  01194     MICRO: cultures reviewed and updated by me          Wound Culture [[id number]] Collected: 12/10/18 0900   Order Status: Sent Specimen: Leg from Leg Updated: 12/10/18 0908   Urine Culture [[id number]] Collected: 12/09/18 0157   Order Status: No result Updated: 12/10/18 0215   CULTURE BLOOD #1 [[id number]] Collected: 12/10/18 0000   Order Status: Canceled Specimen: Blood from Blood    CULTURE BLOOD #2 [[id number]] Collected: 12/10/18 0000   Order Status: Canceled Specimen: Blood    Culture Blood #1 [[id number]] Collected: 12/09/18 2328   Order Status: Sent Specimen: Blood from Blood Updated: 12/09/18 2342   Culture Blood #2 [[id number]] Collected: 12/09/18 2328   Order Status: Sent Specimen: Blood Updated: 12/09/18          Urine Culture  No results for input(s): Venus Dry in the last 72 hours. Imaging:   XR TIBIA FIBULA LEFT (2 VIEWS)   Final Result   No definite cortical destruction to suggest osteomyelitis. If clinical   suspicion persists, MRI would be of further value. eCHO 9/26/2018     --------------------------------------------------------------------  Study Conclusions    - Left ventricle: Systolic function was severely reduced. consultation. PLAN :  1. Cont IV Vancomycin until MRSA excluded  2. Reduce the dose  3. Cont IV Zosyn for mixed infectious process  4. ESR, CRP  5. Cont HAART as before   6. HbA1c CHECK tomorrow   7. Will need PVD eval formal once infection cleared     Discussed with patient/Family d/w RN     Thanks for allowing me to participate in your patient's care please call me with any questions or concerns.     Dr. Walker Santamaria MD  [address] [place] [address] Physician  Phone: [phone]   Fax : [phone]

## 2018-12-10 NOTE — ED PROVIDER NOTES
TAKE 1 TABLET BY MOUTH TWICE A DAY AS NEEDED FOR NAUSEA. RALTEGRAVIR (ISENTRESS) 400 MG TABLET    TAKE 1 TABLET BY MOUTH TWICE A DAY    SIMETHICONE 125 MG CAPS    Take 1 tablet by mouth    TRAMADOL (ULTRAM) 50 MG TABLET    Take 50 mg by mouth. .    ZOLPIDEM (AMBIEN) 10 MG TABLET    Take 10 mg by mouth. .         ALLERGIES     Patient has no known allergies. FAMILYHISTORY     No family history on file. SOCIAL HISTORY       Social History     Social History    Marital status: Single     Spouse name: N/A    Number of children: N/A    Years of education: N/A     Social History Main Topics    Smoking status: Never Smoker    Smokeless tobacco: Never Used    Alcohol use No    Drug use: No    Sexual activity: Not on file     Other Topics Concern    Not on file     Social History Narrative    No narrative on file       SCREENINGS             PHYSICAL EXAM    (up to 7 for level 4, 8 or more for level 5)     ED Triage Vitals [12/09/18 2254]   BP Temp Temp Source Pulse Resp SpO2 Height Weight   (!) 126/90 98.2 °F (36.8 °C) Oral 157 18 100 % 6' 2\" (1.88 m) 168 lb 10.4 oz (76.5 kg)       Physical Exam   Constitutional: He is oriented to person, place, and time. Vital signs are normal. He appears well-developed and well-nourished. Non-toxic appearance. No distress. HENT:   Head: Normocephalic and atraumatic. Eyes: Conjunctivae are normal. No scleral icterus. Neck: Normal range of motion. No JVD present. Cardiovascular: Normal rate and regular rhythm. Exam reveals no gallop and no friction rub. No murmur heard. Pulmonary/Chest: Effort normal and breath sounds normal. No respiratory distress. Musculoskeletal: Normal range of motion. Legs:  Neurological: He is alert and oriented to person, place, and time. He has normal strength. No cranial nerve deficit or sensory deficit. Skin: Skin is warm and dry. Capillary refill takes less than 2 seconds. Lesion noted. There is erythema.    Large Performed at:  [facility]  [address] [place], [place] [address]   Phone [phone]   MICROSCOPIC URINALYSIS - Abnormal; Notable for the following:     RBC, UA 5-10 (*)     All other components within normal limits    Narrative:     Performed at:  [facility]  [address] [place], [place] [address]   Phone [phone]   CULTURE BLOOD #2   CULTURE BLOOD #1   URINE CULTURE   LACTIC ACID, PLASMA    Narrative:     Performed at:  [facility]  [address] [place], [place] [address]   Phone [phone]   BRAIN NATRIURETIC PEPTIDE       All other labs were within normal range or not returned as of this dictation. EKG: All EKG's are interpreted by the Emergency Department Physician who either signs orCo-signs this chart in the absence of a cardiologist.  Please see their note for interpretation of EKG. RADIOLOGY:   Non-plain film images such as CT, Ultrasound and MRI are read by the radiologist. Plain radiographic images are visualized andpreliminarily interpreted by the  ED Provider with the below findings:        Interpretation perthe Radiologist below, if available at the time of this note:    XR TIBIA FIBULA LEFT (2 VIEWS)   Final Result   No definite cortical destruction to suggest osteomyelitis. If clinical   suspicion persists, MRI would be of further value. No results found. PROCEDURES   Unless otherwise noted below, none     Procedures    CRITICAL CARE TIME   CRITICAL CARE NOTE:  There was a high probability of clinically significant life-threatening deterioration of the patient's condition requiring my urgent intervention. Total critical care time was at least 33 minutes.     This includes vital sign monitoring, pulse oximetry monitoring, telemetry monitoring, clinical response to the IV medications, reviewing the nursing notes, consultation time, dictation/documentation

## 2018-12-10 NOTE — CONSULTS
[facility]            Pt Name: Dacia Botello  MRN: [id number]  YOB: 1960  Admission date: 12/9/2018 10:52 PM   Date of evaluation: 12/10/2018  Requesting Provider: Dr. Crishtian Quiroz   Reason for Consult: wound infection     Chief complaint: leg wound      SUBJECTIVE:    History of Present Illness:       Dacia Botello is a 62 y.o. male with a history of HIV, HTN, PAD, Hep C, Hep B, CAD and DM that presented on 12/9/2018 10:52 PM with complaints of  Left left leg wound who we were asked to see for gangrenous wound infection. He had BLE cellulitis with wounds on and off since July. He was initially treated at [facility], but did not follow up. Left leg wound now foul smelling and very painful to touch. He denies fever, chills, chest pain or SOB.          PMH / [address] / FH / SH/ medications/ allergies/ Lab values / Imaging / Cultures: EMR Reviewed    REVIEW OF SYSTEMS:  CONSTITUTIONAL:  negative  HEENT:  negative  RESPIRATORY:  negative  CARDIOVASCULAR:  negative  GASTROINTESTINAL:  negative  GENITOURINARY:  negative  HEMATOLOGIC/LYMPHATIC:  negative  NEUROLOGICAL:  negative  SKIN: negative except BLE leg wounds, left worse than right, left leg wound foul smelling and intermittent BLE edema       OBJECTIVE:  VITALS:    /79   Pulse 64   Temp 97.9 °F (36.6 °C) (Oral)   Resp 16   Ht 6' 2\" (1.88 m)   Wt 171 lb 4.8 oz (77.7 kg)   SpO2 98%   BMI 21.99 kg/m²     CONSTITUTIONAL:  alert, no apparent distress   NEUROLOGIC:  Mental Status Exam:  Level of Alertness:   awake  Orientation:  Oriented to person, place, time  EYES:  sclera clear  ENT:  normocepalic, without obvious abnormality  NECK:  supple, symmetrical, trachea midline  LUNGS:  clear to auscultation  CARDIAC: regular rate and rhythm   ABDOMEN: abdomen is soft without significant tenderness, masses, organomegaly or guarding  VASCULAR:  Monophasic doppler signal bilateral PTs, unable to get doppler signal DPs, palp fem

## 2018-12-10 NOTE — PROGRESS NOTES
Resting in bed. Drowsy. dsg intact left lower ext, foul smelling, mod amt bloody drainage reinforced. ivf kvo, antibiotic infusing. RA. Min s/s of pain.   Electronically signed by Alee Bruner RN on 12/10/2018 at 5:21 PM

## 2018-12-11 LAB
ALBUMIN SERPL-MCNC: 2.5 G/DL (ref 3.4–5)
ALP BLD-CCNC: 101 U/L (ref 40–129)
ALT SERPL-CCNC: 160 U/L (ref 10–40)
ANION GAP SERPL CALCULATED.3IONS-SCNC: 10 MMOL/L (ref 3–16)
APTT: 41.7 SEC (ref 26–36)
APTT: 78.2 SEC (ref 26–36)
AST SERPL-CCNC: 67 U/L (ref 15–37)
BASOPHILS ABSOLUTE: 0 K/UL (ref 0–0.2)
BASOPHILS RELATIVE PERCENT: 0.5 %
BILIRUB SERPL-MCNC: 1.5 MG/DL (ref 0–1)
BILIRUBIN DIRECT: 0.9 MG/DL (ref 0–0.3)
BILIRUBIN, INDIRECT: 0.6 MG/DL (ref 0–1)
BUN BLDV-MCNC: 20 MG/DL (ref 7–20)
CALCIUM SERPL-MCNC: 7.9 MG/DL (ref 8.3–10.6)
CHLORIDE BLD-SCNC: 98 MMOL/L (ref 99–110)
CO2: 25 MMOL/L (ref 21–32)
CREAT SERPL-MCNC: 1.5 MG/DL (ref 0.9–1.3)
EOSINOPHILS ABSOLUTE: 0 K/UL (ref 0–0.6)
EOSINOPHILS RELATIVE PERCENT: 0.5 %
ESTIMATED AVERAGE GLUCOSE: 203 MG/DL
GFR AFRICAN AMERICAN: 58
GFR NON-AFRICAN AMERICAN: 48
GLUCOSE BLD-MCNC: 119 MG/DL (ref 70–99)
GLUCOSE BLD-MCNC: 127 MG/DL (ref 70–99)
GLUCOSE BLD-MCNC: 144 MG/DL (ref 70–99)
GLUCOSE BLD-MCNC: 250 MG/DL (ref 70–99)
GLUCOSE BLD-MCNC: 93 MG/DL (ref 70–99)
HBA1C MFR BLD: 8.7 %
HCT VFR BLD CALC: 40.4 % (ref 40.5–52.5)
HCT VFR BLD CALC: 40.4 % (ref 40.5–52.5)
HEMOGLOBIN: 12.7 G/DL (ref 13.5–17.5)
HEMOGLOBIN: 12.8 G/DL (ref 13.5–17.5)
LV EF: 20 %
LVEF MODALITY: NORMAL
LYMPHOCYTES ABSOLUTE: 1.8 K/UL (ref 1–5.1)
LYMPHOCYTES RELATIVE PERCENT: 18.6 %
MAGNESIUM: 1.9 MG/DL (ref 1.8–2.4)
MCH RBC QN AUTO: 26.1 PG (ref 26–34)
MCH RBC QN AUTO: 26.2 PG (ref 26–34)
MCHC RBC AUTO-ENTMCNC: 31.5 G/DL (ref 31–36)
MCHC RBC AUTO-ENTMCNC: 31.6 G/DL (ref 31–36)
MCV RBC AUTO: 82.5 FL (ref 80–100)
MCV RBC AUTO: 83.3 FL (ref 80–100)
MONOCYTES ABSOLUTE: 0.7 K/UL (ref 0–1.3)
MONOCYTES RELATIVE PERCENT: 7.3 %
NEUTROPHILS ABSOLUTE: 7.2 K/UL (ref 1.7–7.7)
NEUTROPHILS RELATIVE PERCENT: 73.1 %
PDW BLD-RTO: 19.9 % (ref 12.4–15.4)
PDW BLD-RTO: 20.4 % (ref 12.4–15.4)
PERFORMED ON: ABNORMAL
PERFORMED ON: NORMAL
PHOSPHORUS: 2.8 MG/DL (ref 2.5–4.9)
PLATELET # BLD: 242 K/UL (ref 135–450)
PLATELET # BLD: 257 K/UL (ref 135–450)
PMV BLD AUTO: 7.6 FL (ref 5–10.5)
PMV BLD AUTO: 7.6 FL (ref 5–10.5)
POTASSIUM REFLEX MAGNESIUM: 3.6 MMOL/L (ref 3.5–5.1)
RBC # BLD: 4.85 M/UL (ref 4.2–5.9)
RBC # BLD: 4.9 M/UL (ref 4.2–5.9)
SODIUM BLD-SCNC: 133 MMOL/L (ref 136–145)
TOTAL PROTEIN: 6 G/DL (ref 6.4–8.2)
URINE CULTURE, ROUTINE: NORMAL
WBC # BLD: 11.3 K/UL (ref 4–11)
WBC # BLD: 9.8 K/UL (ref 4–11)

## 2018-12-11 PROCEDURE — 85730 THROMBOPLASTIN TIME PARTIAL: CPT

## 2018-12-11 PROCEDURE — 84100 ASSAY OF PHOSPHORUS: CPT

## 2018-12-11 PROCEDURE — 6370000000 HC RX 637 (ALT 250 FOR IP): Performed by: INTERNAL MEDICINE

## 2018-12-11 PROCEDURE — 99233 SBSQ HOSP IP/OBS HIGH 50: CPT | Performed by: INTERNAL MEDICINE

## 2018-12-11 PROCEDURE — 2580000003 HC RX 258: Performed by: INTERNAL MEDICINE

## 2018-12-11 PROCEDURE — 6360000002 HC RX W HCPCS

## 2018-12-11 PROCEDURE — G8978 MOBILITY CURRENT STATUS: HCPCS

## 2018-12-11 PROCEDURE — APPNB30 APP NON BILLABLE TIME 0-30 MINS: Performed by: NURSE PRACTITIONER

## 2018-12-11 PROCEDURE — 1200000000 HC SEMI PRIVATE

## 2018-12-11 PROCEDURE — G8979 MOBILITY GOAL STATUS: HCPCS

## 2018-12-11 PROCEDURE — 90686 IIV4 VACC NO PRSV 0.5 ML IM: CPT

## 2018-12-11 PROCEDURE — 6360000002 HC RX W HCPCS: Performed by: INTERNAL MEDICINE

## 2018-12-11 PROCEDURE — 6370000000 HC RX 637 (ALT 250 FOR IP): Performed by: NURSE PRACTITIONER

## 2018-12-11 PROCEDURE — 80048 BASIC METABOLIC PNL TOTAL CA: CPT

## 2018-12-11 PROCEDURE — 94760 N-INVAS EAR/PLS OXIMETRY 1: CPT

## 2018-12-11 PROCEDURE — 83735 ASSAY OF MAGNESIUM: CPT

## 2018-12-11 PROCEDURE — G0008 ADMIN INFLUENZA VIRUS VAC: HCPCS

## 2018-12-11 PROCEDURE — 85025 COMPLETE CBC W/AUTO DIFF WBC: CPT

## 2018-12-11 PROCEDURE — 97535 SELF CARE MNGMENT TRAINING: CPT

## 2018-12-11 PROCEDURE — 6360000002 HC RX W HCPCS: Performed by: NURSE PRACTITIONER

## 2018-12-11 PROCEDURE — APPSS45 APP SPLIT SHARED TIME 31-45 MINUTES: Performed by: NURSE PRACTITIONER

## 2018-12-11 PROCEDURE — 36415 COLL VENOUS BLD VENIPUNCTURE: CPT

## 2018-12-11 PROCEDURE — 93306 TTE W/DOPPLER COMPLETE: CPT

## 2018-12-11 PROCEDURE — 97530 THERAPEUTIC ACTIVITIES: CPT

## 2018-12-11 PROCEDURE — 85027 COMPLETE CBC AUTOMATED: CPT

## 2018-12-11 PROCEDURE — APPNB45 APP NON BILLABLE 31-45 MINUTES: Performed by: NURSE PRACTITIONER

## 2018-12-11 PROCEDURE — 80076 HEPATIC FUNCTION PANEL: CPT

## 2018-12-11 PROCEDURE — 83036 HEMOGLOBIN GLYCOSYLATED A1C: CPT

## 2018-12-11 RX ORDER — TRAMADOL HYDROCHLORIDE 50 MG/1
50 TABLET ORAL EVERY 6 HOURS PRN
Status: DISCONTINUED | OUTPATIENT
Start: 2018-12-11 | End: 2018-12-18

## 2018-12-11 RX ORDER — DIPHENHYDRAMINE HCL 25 MG
25 TABLET ORAL NIGHTLY PRN
Status: DISCONTINUED | OUTPATIENT
Start: 2018-12-11 | End: 2018-12-27 | Stop reason: HOSPADM

## 2018-12-11 RX ORDER — OXYCODONE HYDROCHLORIDE AND ACETAMINOPHEN 5; 325 MG/1; MG/1
1 TABLET ORAL EVERY 4 HOURS PRN
Status: DISCONTINUED | OUTPATIENT
Start: 2018-12-11 | End: 2018-12-18

## 2018-12-11 RX ORDER — ACETAMINOPHEN 325 MG/1
650 TABLET ORAL EVERY 6 HOURS PRN
Status: DISCONTINUED | OUTPATIENT
Start: 2018-12-11 | End: 2018-12-13 | Stop reason: ALTCHOICE

## 2018-12-11 RX ORDER — HEPARIN SODIUM 1000 [USP'U]/ML
4000 INJECTION, SOLUTION INTRAVENOUS; SUBCUTANEOUS ONCE
Status: COMPLETED | OUTPATIENT
Start: 2018-12-11 | End: 2018-12-11

## 2018-12-11 RX ORDER — MORPHINE SULFATE 2 MG/ML
2 INJECTION, SOLUTION INTRAMUSCULAR; INTRAVENOUS ONCE
Status: COMPLETED | OUTPATIENT
Start: 2018-12-11 | End: 2018-12-11

## 2018-12-11 RX ORDER — SODIUM CHLORIDE 9 MG/ML
INJECTION, SOLUTION INTRAVENOUS CONTINUOUS
Status: ACTIVE | OUTPATIENT
Start: 2018-12-11 | End: 2018-12-12

## 2018-12-11 RX ORDER — METOPROLOL SUCCINATE 50 MG/1
50 TABLET, EXTENDED RELEASE ORAL 2 TIMES DAILY
Status: DISCONTINUED | OUTPATIENT
Start: 2018-12-11 | End: 2018-12-16

## 2018-12-11 RX ORDER — HEPARIN SODIUM 10000 [USP'U]/100ML
12.7 INJECTION, SOLUTION INTRAVENOUS CONTINUOUS
Status: DISCONTINUED | OUTPATIENT
Start: 2018-12-11 | End: 2018-12-17

## 2018-12-11 RX ORDER — SPIRONOLACTONE 25 MG/1
12.5 TABLET ORAL DAILY
Status: DISCONTINUED | OUTPATIENT
Start: 2018-12-11 | End: 2018-12-13

## 2018-12-11 RX ADMIN — OXYCODONE AND ACETAMINOPHEN 1 TABLET: 5; 325 TABLET ORAL at 22:26

## 2018-12-11 RX ADMIN — ABACAVIR 600 MG: 300 TABLET, FILM COATED ORAL at 08:33

## 2018-12-11 RX ADMIN — HEPARIN SODIUM 4000 UNITS: 1000 INJECTION INTRAVENOUS; SUBCUTANEOUS at 13:35

## 2018-12-11 RX ADMIN — Medication 10 ML: at 12:04

## 2018-12-11 RX ADMIN — METOPROLOL SUCCINATE 50 MG: 50 TABLET, EXTENDED RELEASE ORAL at 20:42

## 2018-12-11 RX ADMIN — HEPARIN SODIUM 9.5 ML/HR: 10000 INJECTION, SOLUTION INTRAVENOUS at 13:36

## 2018-12-11 RX ADMIN — RALTEGRAVIR 400 MG: 400 TABLET, FILM COATED ORAL at 08:33

## 2018-12-11 RX ADMIN — PIPERACILLIN SODIUM,TAZOBACTAM SODIUM 3.38 G: 3; .375 INJECTION, POWDER, FOR SOLUTION INTRAVENOUS at 00:26

## 2018-12-11 RX ADMIN — SODIUM CHLORIDE: 9 INJECTION, SOLUTION INTRAVENOUS at 10:37

## 2018-12-11 RX ADMIN — MORPHINE SULFATE 2 MG: 2 INJECTION, SOLUTION INTRAMUSCULAR; INTRAVENOUS at 12:03

## 2018-12-11 RX ADMIN — MONTELUKAST SODIUM 10 MG: 10 TABLET, COATED ORAL at 20:42

## 2018-12-11 RX ADMIN — PANTOPRAZOLE SODIUM 40 MG: 40 TABLET, DELAYED RELEASE ORAL at 05:40

## 2018-12-11 RX ADMIN — PIPERACILLIN SODIUM,TAZOBACTAM SODIUM 3.38 G: 3; .375 INJECTION, POWDER, FOR SOLUTION INTRAVENOUS at 17:41

## 2018-12-11 RX ADMIN — LAMIVUDINE 300 MG: 150 TABLET, FILM COATED ORAL at 08:33

## 2018-12-11 RX ADMIN — INFLUENZA A VIRUS A/MICHIGAN/45/2015 X-275 (H1N1) ANTIGEN (FORMALDEHYDE INACTIVATED), INFLUENZA A VIRUS A/SINGAPORE/INFIMH-16-0019/2016 IVR-186 (H3N2) ANTIGEN (FORMALDEHYDE INACTIVATED), INFLUENZA B VIRUS B/PHUKET/3073/2013 ANTIGEN (FORMALDEHYDE INACTIVATED), AND INFLUENZA B VIRUS B/MARYLAND/15/2016 BX-69A ANTIGEN (FORMALDEHYDE INACTIVATED) 0.5 ML: 15; 15; 15; 15 INJECTION, SUSPENSION INTRAMUSCULAR at 08:35

## 2018-12-11 RX ADMIN — INSULIN GLARGINE 12 UNITS: 100 INJECTION, SOLUTION SUBCUTANEOUS at 21:40

## 2018-12-11 RX ADMIN — Medication 10 ML: at 20:42

## 2018-12-11 RX ADMIN — Medication 10 ML: at 08:36

## 2018-12-11 RX ADMIN — Medication 1250 MG: at 04:39

## 2018-12-11 RX ADMIN — ATORVASTATIN CALCIUM 80 MG: 80 TABLET, FILM COATED ORAL at 08:32

## 2018-12-11 RX ADMIN — RALTEGRAVIR 400 MG: 400 TABLET, FILM COATED ORAL at 20:41

## 2018-12-11 RX ADMIN — ENOXAPARIN SODIUM 40 MG: 40 INJECTION SUBCUTANEOUS at 08:33

## 2018-12-11 RX ADMIN — PIPERACILLIN SODIUM,TAZOBACTAM SODIUM 3.38 G: 3; .375 INJECTION, POWDER, FOR SOLUTION INTRAVENOUS at 08:39

## 2018-12-11 RX ADMIN — ASPIRIN 81 MG 81 MG: 81 TABLET ORAL at 08:33

## 2018-12-11 ASSESSMENT — PAIN DESCRIPTION - ORIENTATION
ORIENTATION: LEFT

## 2018-12-11 ASSESSMENT — PAIN SCALES - GENERAL
PAINLEVEL_OUTOF10: 0
PAINLEVEL_OUTOF10: 0
PAINLEVEL_OUTOF10: 3
PAINLEVEL_OUTOF10: 9
PAINLEVEL_OUTOF10: 6
PAINLEVEL_OUTOF10: 8
PAINLEVEL_OUTOF10: 6
PAINLEVEL_OUTOF10: 8
PAINLEVEL_OUTOF10: 6

## 2018-12-11 ASSESSMENT — PAIN SCALES - WONG BAKER: WONGBAKER_NUMERICALRESPONSE: 2

## 2018-12-11 ASSESSMENT — PAIN DESCRIPTION - PROGRESSION
CLINICAL_PROGRESSION: NOT CHANGED
CLINICAL_PROGRESSION: NOT CHANGED

## 2018-12-11 ASSESSMENT — PAIN DESCRIPTION - ONSET
ONSET: ON-GOING

## 2018-12-11 ASSESSMENT — PAIN DESCRIPTION - FREQUENCY
FREQUENCY: CONTINUOUS

## 2018-12-11 ASSESSMENT — PAIN DESCRIPTION - PAIN TYPE
TYPE: ACUTE PAIN;SURGICAL PAIN
TYPE: ACUTE PAIN

## 2018-12-11 ASSESSMENT — PAIN DESCRIPTION - LOCATION
LOCATION: LEG

## 2018-12-11 ASSESSMENT — PAIN DESCRIPTION - DESCRIPTORS
DESCRIPTORS: ACHING
DESCRIPTORS: ACHING;CONSTANT

## 2018-12-11 NOTE — PROGRESS NOTES
Pt rested well throughout the night but still remained having conversations with himself and concerned there is \"someone out to get him here. \"    Electronically signed by Crhistopher Haro RN on 12/11/2018 at 6:24 AM

## 2018-12-11 NOTE — OP NOTE
up needing an amputation, but we would  try our best to avoid this. He understood and agreed. He understood he  has a very high cardiac risk with an ejection fraction about 25 and we  got Medicine consulted, we asked ID consult, we have Anesthesia to  consult and the feeling is that this is urgent and emergent and that we  better bite the bullet and get this wound debrided. It is too painful  to try to debride it at the bedside, he would barely let us change the  dressing much less debride it. So, he is taken to the operating room. OPERATIVE PROCEDURE:  The patient was prepped and draped with Betadine  after general anesthesia and we could see the anterior muscles exposed  and a few centimeters down from the top of the wound, they were  necrotic, so we cut through these, worked our way down. I had to extend  the incision inferiorly to his ankle to get the necrotic tendons out. A  deep culture was taken from these necrotic tendons. A swab culture had  already been done on the floor. We used forceps scissors, scalpel and  greenstick curettes to debride the entire wound. Removing necrotic  tissue, dead tissue, muscles that poorly bled and again the tibia was  exposed, 7-cm long, 2-cm wide section of the tibia that was in the  wound, we did not remove the tibia or any of the bone, but the necrotic  tissue was debrided off it and down to the bone. The final debridement area measured 20 cm long 6 cm wide 2-1/2 cm deep    Once we were satisfied that we had debrided as much as we could, we also  used the Bovie unit to keep the field dry. We used the PulsaVac  irrigation system to clean the wound and then used Kerlix wrap to pack  it. We added ABD and another Kerlix to hold it in place and the patient  was sent to recovery room in somewhat stable condition.     The plan will be to continue antibiotics, wait for the cultures, and to  try to do an angiogram later this week to see if we can get more blood  to the

## 2018-12-11 NOTE — CONSULTS
[address] [facility]                                  CONSULTATION    PATIENT NAME: Yasmine Urbano                        :        1960  MED REC NO:   [id number]                          ROOM:       [id number]  ACCOUNT NO:   [de-identified]                           ADMIT DATE: 2018  PROVIDER:     Patti Martinez MD    CARDIOLOGY CONSULTATION    CONSULT DATE:  12/10/2018    HISTORY OF PRESENT ILLNESS:  This is a 70-year-old male who has  presented to [facility] [place] with a nonhealing ulcer of his left  lower extremity which has been foul smelling. During this admission, he  also had blood test done which showed slightly elevated troponin and  significantly elevated BNP requiring this cardiac consultation. He  denies having any chest pain to me but has some off and on shortness of  breath. He has also had trouble with swelling in his legs. The patient was seen immediately after his mini wound surgery so  detailed history could not be obtained. REVIEW OF SYSTEMS:  All the systems were reviewed but they are limited  due to the patient's inability to carry on a detailed conversation. PAST MEDICAL HISTORY:  1. The patient had a history of ischemic cardiomyopathy. His ejection  fraction checked at the [facility] recently showed EF of 20% to  25%. He had a cardiac catheterization recently which showed an in-stent  restenosis of his LAD. He had a 50% in-stent restenosis of his LAD as  well as RCA. He had an occluded left circumflex artery which apparently  showed no progression from the previous angiogram.  2.  History of diabetes. 3.  History of peripheral arterial disease, details of which are  currently not available in detail to me. 4.  History of HIV.  5.  Hyperlipidemia. 6.  Hypertension. 7.  History of TIA and stroke.     SOCIAL HISTORY:  The patient has a remote history of smoking but small  troponin elevation. This is probably due to demand-supply mismatch with  his current infection. 4.  Wound infection. This is suspicious for underlying peripheral  arterial disease. RECOMMENDATIONS:  1.  I will place the patient on a monitor. 2.  The patient will need a long-term anticoagulation but we will wait  until he completes his workup and treatment for his wound infection. 3.  I will switch the patient from a regular metoprolol to carvedilol. 4.  We will add Aldactone to his regiment. 5.  We will consider switching the patient from lisinopril to [facility]  after he completes his workup for his peripheral arterial disease. 6.  The patient has a very high CHADSVASC score and therefore, will need  long-term anticoagulation which also will be considered after he  completes his workup for his peripheral arterial disease. 7.  We will review his echo and consider starting the patient on IV  heparin if needed. I appreciate the opportunity to participate in the care of this pleasant  male.         Meredith Quach MD    D: 12/11/2018 11:48:31       T: 12/11/2018 17:27:57     [email or web address]  Job#: [id number]     Doc#: [id number]    CC:

## 2018-12-11 NOTE — PROGRESS NOTES
Hospitalist Progress Note      PCP: Ashwin Manzo MD    Date of Admission: 12/9/2018        Subjective: feels ok, no chest pain, palpitation or fever. Medications:  Reviewed    Infusion Medications    sodium chloride      dextrose       Scheduled Medications    atorvastatin  80 mg Oral Daily    aspirin  81 mg Oral Daily    insulin glargine  12 Units Subcutaneous Nightly    lisinopril  40 mg Oral Daily    metoprolol  100 mg Oral BID    montelukast  10 mg Oral Nightly    pantoprazole  40 mg Oral QAM AC    raltegravir  400 mg Oral BID    sodium chloride flush  10 mL Intravenous 2 times per day    enoxaparin  40 mg Subcutaneous Daily    piperacillin-tazobactam  3.375 g Intravenous Q8H    abacavir  600 mg Oral Daily    lamiVUDine  300 mg Oral Daily    vancomycin (VANCOCIN) intermittent dosing (placeholder)   Other RX Placeholder    vancomycin  1,250 mg Intravenous Q24H     PRN Meds: sodium chloride flush, glucose, dextrose, glucagon (rDNA), dextrose      Intake/Output Summary (Last 24 hours) at 12/11/18 0938  Last data filed at 12/11/18 0600   Gross per 24 hour   Intake             1220 ml   Output              300 ml   Net              920 ml       Physical Exam Performed:    BP (!) 89/57   Pulse 73   Temp 97.4 °F (36.3 °C) (Oral)   Resp 16   Ht 6' 2\" (1.88 m)   Wt 174 lb 2.6 oz (79 kg)   SpO2 100%   BMI 22.36 kg/m²     General appearance: No apparent distress. Neck: Supple  Respiratory:  Normal respiratory effort. Clear to auscultation, bilaterally without Rales/Wheezes/Rhonchi. Cardiovascular: Regular rate and rhythm with normal S1/S2 without murmurs, rubs or gallops. Abdomen: Soft, non-tender, non-distended with normal bowel sounds. Musculoskeletal: left leg surgically dressed.    Neurologic:  No focal weakness   Psychiatric: Alert and oriented  Capillary Refill: Brisk,< 3 seconds   Peripheral Pulses: +2 palpable, equal bilaterally       Labs:   Recent Labs

## 2018-12-11 NOTE — PROGRESS NOTES
history of Anxiety; Diabetes mellitus ([facility] [address] Hep B w/o coma; Hepatitis C without hepatic coma; HIV (human immunodeficiency virus infection) ([facility] [address] Hyperlipidemia; and Hypertension. has a past surgical history that includes incision and drainage (Left, 12/10/2018). Restrictions  Restrictions/Precautions  Restrictions/Precautions: Fall Risk  Position Activity Restriction  Other position/activity restrictions: IV,  up with assistance    Subjective   General  Chart Reviewed: Yes  Additional Pertinent Hx: ED 12/9 related to pain, redness, warmth to chronic left LE ulcer, tachycardia; PMH: HIV, Hep C, Hep B, HLD, HTN, DMII, CAD, crystal meth user  Family / Caregiver Present: No  Referring Practitioner: Jasmin Zamarripa MD  Subjective  Subjective: Patient reports 4-6/10 left LE pain - \"uncomfortable. \" aggreeable to therapy. General Comment  Comments: Patient supine in bed - agreeable to PT. Pending OR this afternoon - RN approval obtained prior to PT entry  Pain Screening  Patient Currently in Pain: Yes  Pain Assessment  Hughes-Llanos Pain Rating: Hurts a little bit  Clinical Progression: Not changed  Vital Signs  Patient Currently in Pain: Yes       Cognition   Cognition  Overall Cognitive Status: WFL  Objective   Bed mobility  Supine to Sit: Supervision (HOB slightly raised, slow moving due to pain)     Other Activities: Other (see comment)  Comment: pt required max education and encouragement for physical activity at this time. pt needed help problem solving how to ambulate to restroom. PT suggested using RW to help w/ leg pain and began gait training w/ RW when Wound Management arrived. Assessment   Body structures, Functions, Activity limitations: Decreased functional mobility ; Decreased strength;Decreased endurance;Decreased balance  Assessment: Patient demonstrates impaired mobility related to pain and chronic left LE wound - pending OR this afternoon.  Pt educated on importance of physical activity to improve

## 2018-12-11 NOTE — PROGRESS NOTES
Occupational Therapy  Facility/Department: [address] ORTHOPEDICS  Daily Treatment Note  NAME: Lucas Williamson  : 1960  MRN: [id number]    Date of Service: 2018    Discharge Recommendations:  Continue to assess pending progress (sx planned for )       Patient Diagnosis(es): The primary encounter diagnosis was Wound infection. Diagnoses of Tachycardia, Elevated troponin, Amphetamine abuse ([facility] [address] and Human immunodeficiency virus (HIV) disease ([facility] [address] were also pertinent to this visit. has a past medical history of Anxiety; Diabetes mellitus ([facility] [address] Hep B w/o coma; Hepatitis C without hepatic coma; HIV (human immunodeficiency virus infection) ([facility] [address] Hyperlipidemia; and Hypertension. has a past surgical history that includes incision and drainage (Left, 12/10/2018). Restrictions  Restrictions/Precautions  Restrictions/Precautions: Fall Risk (low)  Position Activity Restriction  Other position/activity restrictions: IV,  up with assistance  Subjective   General  Chart Reviewed: Yes  Patient assessed for rehabilitation services?: Yes  Family / Caregiver Present: No  Referring Practitioner: Maria Luisa Crabtree MD   Diagnosis: Multiple open wounds on Left LE  Subjective  Subjective: Pt met bedside, agreeable for OT for ADL's. Pt c/o pain in LLE  Pain Assessment  Patient Currently in Pain: Yes  Pain Level: 8  Vital Signs  Patient Currently in Pain: Yes   Orientation  Orientation  Overall Orientation Status: Within Functional Limits  Objective    ADL  UE Bathing: Setup  LE Bathing: Setup (requested privacy to complete: seated at EOB and extended time to wash fazal area and reports leaned to sides to wash posterior. Declined washing to LE)  UE Dressing:  (assist to change hospital IV gown)  LE Dressing: Unable to assess(comment) (not attempted-pt declined)  Toileting: Supervision (used urinal with set up)  Additional Comments: sponge bathed from EOB. Assistance to wash back.          Balance  Sitting Balance:

## 2018-12-12 LAB
ALBUMIN SERPL-MCNC: 2.4 G/DL (ref 3.4–5)
ALP BLD-CCNC: 103 U/L (ref 40–129)
ALT SERPL-CCNC: 111 U/L (ref 10–40)
ANION GAP SERPL CALCULATED.3IONS-SCNC: 14 MMOL/L (ref 3–16)
APTT: 55.5 SEC (ref 26–36)
APTT: 62 SEC (ref 26–36)
AST SERPL-CCNC: 41 U/L (ref 15–37)
BASOPHILS ABSOLUTE: 0 K/UL (ref 0–0.2)
BASOPHILS RELATIVE PERCENT: 0.4 %
BILIRUB SERPL-MCNC: 1 MG/DL (ref 0–1)
BILIRUBIN DIRECT: 0.7 MG/DL (ref 0–0.3)
BILIRUBIN, INDIRECT: 0.3 MG/DL (ref 0–1)
BUN BLDV-MCNC: 28 MG/DL (ref 7–20)
CALCIUM SERPL-MCNC: 7.5 MG/DL (ref 8.3–10.6)
CHLORIDE BLD-SCNC: 99 MMOL/L (ref 99–110)
CO2: 21 MMOL/L (ref 21–32)
CREAT SERPL-MCNC: 1.5 MG/DL (ref 0.9–1.3)
EOSINOPHILS ABSOLUTE: 0.1 K/UL (ref 0–0.6)
EOSINOPHILS RELATIVE PERCENT: 0.8 %
GFR AFRICAN AMERICAN: 58
GFR NON-AFRICAN AMERICAN: 48
GLUCOSE BLD-MCNC: 300 MG/DL (ref 70–99)
GLUCOSE BLD-MCNC: 311 MG/DL (ref 70–99)
HCT VFR BLD CALC: 37.4 % (ref 40.5–52.5)
HEMOGLOBIN: 11.8 G/DL (ref 13.5–17.5)
LYMPHOCYTES ABSOLUTE: 1.2 K/UL (ref 1–5.1)
LYMPHOCYTES RELATIVE PERCENT: 12 %
MAGNESIUM: 1.7 MG/DL (ref 1.8–2.4)
MCH RBC QN AUTO: 26.2 PG (ref 26–34)
MCHC RBC AUTO-ENTMCNC: 31.5 G/DL (ref 31–36)
MCV RBC AUTO: 83.2 FL (ref 80–100)
MONOCYTES ABSOLUTE: 0.9 K/UL (ref 0–1.3)
MONOCYTES RELATIVE PERCENT: 8.9 %
NEUTROPHILS ABSOLUTE: 7.5 K/UL (ref 1.7–7.7)
NEUTROPHILS RELATIVE PERCENT: 77.9 %
PDW BLD-RTO: 20.5 % (ref 12.4–15.4)
PERFORMED ON: ABNORMAL
PHOSPHORUS: 2.8 MG/DL (ref 2.5–4.9)
PLATELET # BLD: 231 K/UL (ref 135–450)
PMV BLD AUTO: 7.6 FL (ref 5–10.5)
POTASSIUM REFLEX MAGNESIUM: 3.5 MMOL/L (ref 3.5–5.1)
RBC # BLD: 4.5 M/UL (ref 4.2–5.9)
SODIUM BLD-SCNC: 134 MMOL/L (ref 136–145)
TOTAL PROTEIN: 5.7 G/DL (ref 6.4–8.2)
WBC # BLD: 9.6 K/UL (ref 4–11)

## 2018-12-12 PROCEDURE — 2580000003 HC RX 258: Performed by: INTERNAL MEDICINE

## 2018-12-12 PROCEDURE — 1200000000 HC SEMI PRIVATE

## 2018-12-12 PROCEDURE — 83735 ASSAY OF MAGNESIUM: CPT

## 2018-12-12 PROCEDURE — 6360000002 HC RX W HCPCS: Performed by: SURGERY

## 2018-12-12 PROCEDURE — 6360000002 HC RX W HCPCS: Performed by: INTERNAL MEDICINE

## 2018-12-12 PROCEDURE — 97530 THERAPEUTIC ACTIVITIES: CPT

## 2018-12-12 PROCEDURE — 6370000000 HC RX 637 (ALT 250 FOR IP): Performed by: INTERNAL MEDICINE

## 2018-12-12 PROCEDURE — 6370000000 HC RX 637 (ALT 250 FOR IP): Performed by: NURSE PRACTITIONER

## 2018-12-12 PROCEDURE — 99233 SBSQ HOSP IP/OBS HIGH 50: CPT | Performed by: INTERNAL MEDICINE

## 2018-12-12 PROCEDURE — 36415 COLL VENOUS BLD VENIPUNCTURE: CPT

## 2018-12-12 PROCEDURE — 84100 ASSAY OF PHOSPHORUS: CPT

## 2018-12-12 PROCEDURE — 85730 THROMBOPLASTIN TIME PARTIAL: CPT

## 2018-12-12 PROCEDURE — APPNB30 APP NON BILLABLE TIME 0-30 MINS: Performed by: NURSE PRACTITIONER

## 2018-12-12 PROCEDURE — APPSS30 APP SPLIT SHARED TIME 16-30 MINUTES: Performed by: NURSE PRACTITIONER

## 2018-12-12 PROCEDURE — 80076 HEPATIC FUNCTION PANEL: CPT

## 2018-12-12 PROCEDURE — 80048 BASIC METABOLIC PNL TOTAL CA: CPT

## 2018-12-12 PROCEDURE — 85025 COMPLETE CBC W/AUTO DIFF WBC: CPT

## 2018-12-12 PROCEDURE — 97535 SELF CARE MNGMENT TRAINING: CPT

## 2018-12-12 RX ORDER — MORPHINE SULFATE 4 MG/ML
4 INJECTION, SOLUTION INTRAMUSCULAR; INTRAVENOUS EVERY 4 HOURS PRN
Status: DISCONTINUED | OUTPATIENT
Start: 2018-12-12 | End: 2018-12-18

## 2018-12-12 RX ORDER — SODIUM CHLORIDE 9 MG/ML
INJECTION, SOLUTION INTRAVENOUS CONTINUOUS
Status: ACTIVE | OUTPATIENT
Start: 2018-12-12 | End: 2018-12-13

## 2018-12-12 RX ADMIN — PIPERACILLIN SODIUM,TAZOBACTAM SODIUM 3.38 G: 3; .375 INJECTION, POWDER, FOR SOLUTION INTRAVENOUS at 15:17

## 2018-12-12 RX ADMIN — PIPERACILLIN SODIUM,TAZOBACTAM SODIUM 3.38 G: 3; .375 INJECTION, POWDER, FOR SOLUTION INTRAVENOUS at 08:14

## 2018-12-12 RX ADMIN — MONTELUKAST SODIUM 10 MG: 10 TABLET, COATED ORAL at 21:21

## 2018-12-12 RX ADMIN — LAMIVUDINE 300 MG: 150 TABLET, FILM COATED ORAL at 08:12

## 2018-12-12 RX ADMIN — Medication 10 ML: at 21:23

## 2018-12-12 RX ADMIN — LISINOPRIL 40 MG: 20 TABLET ORAL at 08:14

## 2018-12-12 RX ADMIN — MORPHINE SULFATE 4 MG: 4 INJECTION INTRAVENOUS at 08:08

## 2018-12-12 RX ADMIN — ABACAVIR 600 MG: 300 TABLET, FILM COATED ORAL at 08:12

## 2018-12-12 RX ADMIN — PANTOPRAZOLE SODIUM 40 MG: 40 TABLET, DELAYED RELEASE ORAL at 05:34

## 2018-12-12 RX ADMIN — SODIUM CHLORIDE: 9 INJECTION, SOLUTION INTRAVENOUS at 15:16

## 2018-12-12 RX ADMIN — RALTEGRAVIR 400 MG: 400 TABLET, FILM COATED ORAL at 08:13

## 2018-12-12 RX ADMIN — Medication 10 ML: at 08:14

## 2018-12-12 RX ADMIN — ATORVASTATIN CALCIUM 80 MG: 80 TABLET, FILM COATED ORAL at 08:13

## 2018-12-12 RX ADMIN — ASPIRIN 81 MG 81 MG: 81 TABLET ORAL at 08:16

## 2018-12-12 RX ADMIN — SPIRONOLACTONE 12.5 MG: 25 TABLET ORAL at 08:13

## 2018-12-12 RX ADMIN — HEPARIN SODIUM 9.5 ML/HR: 10000 INJECTION, SOLUTION INTRAVENOUS at 17:22

## 2018-12-12 RX ADMIN — METOPROLOL SUCCINATE 50 MG: 50 TABLET, EXTENDED RELEASE ORAL at 08:17

## 2018-12-12 RX ADMIN — RALTEGRAVIR 400 MG: 400 TABLET, FILM COATED ORAL at 21:28

## 2018-12-12 RX ADMIN — INSULIN GLARGINE 12 UNITS: 100 INJECTION, SOLUTION SUBCUTANEOUS at 21:28

## 2018-12-12 RX ADMIN — METOPROLOL SUCCINATE 50 MG: 50 TABLET, EXTENDED RELEASE ORAL at 21:20

## 2018-12-12 RX ADMIN — OXYCODONE AND ACETAMINOPHEN 1 TABLET: 5; 325 TABLET ORAL at 18:44

## 2018-12-12 RX ADMIN — PIPERACILLIN SODIUM,TAZOBACTAM SODIUM 3.38 G: 3; .375 INJECTION, POWDER, FOR SOLUTION INTRAVENOUS at 21:21

## 2018-12-12 RX ADMIN — PIPERACILLIN SODIUM,TAZOBACTAM SODIUM 3.38 G: 3; .375 INJECTION, POWDER, FOR SOLUTION INTRAVENOUS at 00:05

## 2018-12-12 ASSESSMENT — PAIN DESCRIPTION - PROGRESSION
CLINICAL_PROGRESSION: NOT CHANGED

## 2018-12-12 ASSESSMENT — PAIN DESCRIPTION - ONSET
ONSET: ON-GOING
ONSET: ON-GOING

## 2018-12-12 ASSESSMENT — PAIN DESCRIPTION - LOCATION
LOCATION: LEG
LOCATION: LEG

## 2018-12-12 ASSESSMENT — PAIN DESCRIPTION - DESCRIPTORS
DESCRIPTORS: ACHING
DESCRIPTORS: ACHING

## 2018-12-12 ASSESSMENT — PAIN SCALES - GENERAL
PAINLEVEL_OUTOF10: 7
PAINLEVEL_OUTOF10: 9
PAINLEVEL_OUTOF10: 0
PAINLEVEL_OUTOF10: 7
PAINLEVEL_OUTOF10: 0

## 2018-12-12 ASSESSMENT — PAIN DESCRIPTION - PAIN TYPE
TYPE: ACUTE PAIN
TYPE: ACUTE PAIN

## 2018-12-12 ASSESSMENT — PAIN DESCRIPTION - ORIENTATION
ORIENTATION: LEFT
ORIENTATION: LEFT

## 2018-12-12 ASSESSMENT — PAIN SCALES - WONG BAKER
WONGBAKER_NUMERICALRESPONSE: 2
WONGBAKER_NUMERICALRESPONSE: 2

## 2018-12-12 ASSESSMENT — PAIN DESCRIPTION - FREQUENCY
FREQUENCY: CONTINUOUS
FREQUENCY: CONTINUOUS

## 2018-12-12 NOTE — PROGRESS NOTES
Infectious Disease Follow up Notes  Admit Date: 12/9/2018  Hospital Day: 4    Antibiotics : iv zOSYN     CHIEF COMPLAINT:    Left leg infection  Necrotic wound  Cellulitis  PVD  HIV   Osteomyelitis     Subjective interval History :  62 y. o. MAN with DM+ and HIV, Hep C+ Hep B immune through infection followed at [facility] for his medical care and followed by ID service  for his HIV on therapy admitted with worsening Left leg infection with malodorous drainage and severe pain from the worsening infection he has seen Vascular surgeon at [facility] and declined admission and surgery there and given the progression admitted here for evaluation. Seen by Debi Blanton and going to OR today for on going infection and pain. He is on HAART and CD4 counts recently > 700 and HIV virus suppressed, he has CAD and CHF.      S/p Left leg deep debridement done and bone at the base of the wound concerning for osteomyelitis and going for Angio tomorrow and has pain with dressing changes requiring morphine with dressing changes and no side effects on therapy        Past Medical History:    Past Medical History:   Diagnosis Date    Anxiety     Diabetes mellitus ([facility] [address]     Hep B w/o coma     Hepatitis C without hepatic coma     HIV (human immunodeficiency virus infection) ([facility] [address]     Hyperlipidemia     Hypertension        Past Surgical History:    Past Surgical History:   Procedure Laterality Date    INCISION AND DRAINAGE Left 12/10/2018    INCISIONAL DEBRIDEMENT INCLUDING MUSCLE AND SKIN SUBCUTANEOUS performed by Keara Armijo MD at [place] [address]       Current Medications:    Outpatient Prescriptions Marked as Taking for the 12/9/18 encounter [facility] HOSPITAL Encounter)   Medication Sig Dispense Refill    furosemide (LASIX) 40 MG tablet Take 40 mg by mouth 2 times daily      potassium chloride (KLOR-CON M) 20 MEQ extended release tablet Take 20 mEq by mouth 2 times daily      calcium carbonate (OYSTER SHELL CALCIUM 500 MG) 1250 (500 Ca) MG tablet Take 1 tablet by mouth 2 times daily      vitamin D (CHOLECALCIFEROL) 1000 units TABS tablet Take 1,000 Units by mouth daily      abacavir-lamivudine (EPZICOM) 600-300 MG per tablet Take 1 tablet by mouth daily       alendronate (FOSAMAX) 70 MG tablet TAKE 1 TABLET BY MOUTH ONCE WEEKLY BEFORE BREAKFAST, ON AN EMPTY STOMACH: REMAIN UPRIGHT FOR 30 MINUTES      ARIPiprazole (ABILIFY) 2 MG tablet Take 2 mg by mouth nightly       aspirin 81 MG chewable tablet Take 81 mg by mouth daily       atorvastatin (LIPITOR) 80 MG tablet TAKE 1 TABLET BY MOUTH DAILY      clonazePAM (KLONOPIN) 1 MG tablet Take 1 mg by mouth 3 times daily as needed. .      fluticasone (FLONASE) 50 MCG/ACT nasal spray PLACE ONE SPRAY IN EACH NOSTRIL ONCE DAILY      insulin aspart (NOVOLOG) 100 UNIT/ML injection pen inject up 4 units before meals with a 1 unit per 50 mg/dL >150 mg/dL.   Max daily dose 50 units      insulin glargine (LANTUS) 100 UNIT/ML injection vial Inject 12 Units into the skin daily       raltegravir (ISENTRESS) 400 MG tablet TAKE 1 TABLET BY MOUTH TWICE A DAY      lidocaine (XYLOCAINE) 5 % ointment Apply topically      lisinopril (PRINIVIL;ZESTRIL) 40 MG tablet Take 40 mg by mouth      loperamide (IMODIUM) 2 MG capsule TAKE 1 CAPSULE BY MOUTH 4 TIMES A DAY AS NEEDED FOR DIARRHEA      metFORMIN (GLUCOPHAGE) 500 MG tablet Take 500 mg by mouth 2 times daily (with meals)       metoprolol (LOPRESSOR) 100 MG tablet Take 100 mg by mouth      mirtazapine (REMERON) 45 MG tablet Take 45 mg by mouth nightly       mometasone (ASMANEX) 220 MCG/INH inhaler Inhale 2 puffs into the lungs daily       montelukast (SINGULAIR) 10 MG tablet Take 10 mg by mouth nightly       nystatin (MYCOSTATIN) 393216 UNIT/GM cream APPLY TOPICALLY TWICE A DAY      pantoprazole (PROTONIX) 40 MG tablet Take 40 mg by mouth daily       promethazine (PHENERGAN) 25 MG no thyromegaly  no cervical lymphadenopathy  Pulmonary/Chest: clear to auscultation bilaterally- no wheezes, rales or rhonchi, normal air movement, no respiratory distress  Cardiovascular: A fib S1 and S2, no murmurs, rubs, clicks, or gallops, no carotid bruits  Abdomen: soft, non-tender, non-distended, normal bowel sounds, no masses or organomegaly  Extremities: no cyanosis, clubbing or + edema  Musculoskeletal: normal range of motion, no joint swelling, deformity or tenderness  Neurologic: reflexes normal and symmetric, no cranial nerve deficit  Psych:  Orientation, sensorium, mood normal  Lines:  IV  Left leg deep ulcer post op dressing+ skin  changes from PVD+  Rt leg punched out ulcers on the leg tibial area            Data Review:    Lab Results   Component Value Date    WBC 9.6 12/12/2018    HGB 11.8 (L) 12/12/2018    HCT 37.4 (L) 12/12/2018    MCV 83.2 12/12/2018     12/12/2018     Lab Results   Component Value Date    CREATININE 1.5 (H) 12/12/2018    BUN 28 (H) 12/12/2018     (L) 12/12/2018    K 3.5 12/12/2018    CL 99 12/12/2018    CO2 21 12/12/2018       Hepatic Function Panel:   Lab Results   Component Value Date    ALKPHOS 103 12/12/2018     12/12/2018    AST 41 12/12/2018    PROT 5.7 12/12/2018    BILITOT 1.0 12/12/2018    BILIDIR 0.7 12/12/2018    IBILI 0.3 12/12/2018    LABALBU 2.4 12/12/2018       UA:  Lab Results   Component Value Date    COLORU DK YELLOW 12/10/2018    CLARITYU Clear 12/10/2018    GLUCOSEU Negative 12/10/2018    BILIRUBINUR SMALL 12/10/2018    KETUA Negative 12/10/2018    SPECGRAV 1.023 12/10/2018    BLOODU MODERATE 12/10/2018    PHUR 6.0 12/10/2018    PROTEINU 100 12/10/2018    UROBILINOGEN 2.0 12/10/2018    NITRU Negative 12/10/2018    LEUKOCYTESUR TRACE 12/10/2018    LABMICR YES 12/10/2018    URINETYPE Not Specified 12/10/2018      Urine Microscopic:   Lab Results   Component Value Date    COMU see below 12/10/2018    WBCUA 0-2 12/10/2018    RBCUA 5-10 leg infection    Per  records CD4 check last > 700 and he says he is complaint with HIV therapy    GALI      Given the depth of the infection and duration concern for extension down to the bone and s/p OR for debridement and Cx GNR mixed process sensitivities noted       Risk for limb loss high and he understand now willing to comply with recommendations and medical care      Going for angio tomorrow for possible intervention      Labs, Microbiology, Radiology and all the pertinent results from current hospitalization and  care every where were reviewed  by me as a part of the evaluation   Plan:   1. D/C Unasyn organisms resistant and will not cover Pseudomonas  2. Cont local care   3. Cont IV Zosyn for mixed infectious process  4. ESR, CRP noted   5. Cont HAART as before - may be a candidate for Triumeq one pill a day   6. HbA1c CHECK done   7. Will need PVD eval formal once infection cleared likely angio tomorrow    Discussed with patient/Family  D/w RN     Thanks for allowing me to participate in your patient's care and please call me with any questions or concerns.     Dolores Demarco MD  Infectious Disease  [facility]) Physician  Phone: [phone]   Fax : [phone]

## 2018-12-12 NOTE — PROGRESS NOTES
purulent drainage   prn pain meds - minimize narcotics  Continue medical care  abtx per ID  In order to promote wound healing and provide long-term limb salvage, recommend proceeding with  Additional diagnostic angiography with possible endovascular revascularization, scheduled for Thursday 12/13/18 - then an amputation might be possible at lower level depending on restoring his blood flow, will be difficult with bine exposes      Discussed with patient and nursing. Discussed with Dr. Sabrina Tsai:  Educated patient on plan of care and disease process. - all questions answered    Maricruz Reyes CNP  Pt seen and examined. Agree with Abraham Pi, APRN note. Labs reviewed.   Wound changed looks Ok no pus exposed bone, Angio in AM

## 2018-12-12 NOTE — PROGRESS NOTES
intervention to facilitate safe mobility and to optimize (I) to promote return to prior level of function    Treatment Diagnosis: impaired mobility  Prognosis: Fair;Guarded  Patient Education: Patient educated on role of PT, use of call light  REQUIRES PT FOLLOW UP: Yes  Activity Tolerance  Activity Tolerance: Patient limited by pain     AM-PAC Score  AM-PAC Inpatient Mobility Raw Score : 16  AM-PAC Inpatient T-Scale Score : 40.78  Mobility Inpatient CMS 0-100% Score: 54.16  Mobility Inpatient CMS G-Code Modifier : AT[address] Lane scored a 16/24 on the AM-PAC short mobility form. Current research shows that an AM-PAC score of 17 or less is typically not associated with a discharge to the patient's home setting. Based on the patients AM-PAC score and their current functional mobility deficits, it is recommended that the patient have 3-5 sessions per week of Physical Therapy at d/c to increase the patients independence. Goals  Short term goals  Time Frame for Short term goals: discharge  Short term goal 1: Pt. will demonstrate (I) bed mobility;  All goals ongoing 12/11  Short term goal 2: Pt. will demonstrate sit <- > stand modified (I) with LRAD  Short term goal 3: Pt. will demonstrate stand pivot with LRAD modified (I)  Short term goal 4: Pt. will ambulate >/= 150ft with LRAD modified (I)  Short term goal 5: Pt. will negotiate 4 stairs with no rail and LRAD with SBA  Patient Goals   Patient goals : \"for left LE to heal and get better - return home when able\"    Plan    Plan  Times per week: 3-5x/week  Current Treatment Recommendations: Strengthening, ROM, Balance Training, Functional Mobility Training, Transfer Training, Gait Training, Stair training, Endurance Training, Home Exercise Program, Safety Education & Training, Patient/Caregiver Education & Training, Equipment Evaluation, Education, & procurement  Safety Devices  Type of devices: Call light within reach, Nurse notified, Left in bed, Bed alarm in place     Therapy Time   Individual Concurrent Group Co-treatment   Time In 3038         Time Out 1230         Minutes 45           If patient is discharged prior to the next Physical Therapy visit, please see last written PT note for discharge status.     Familia Ken PT Electronically signed by Familia Ken PT on 12/12/2018 at 2:03 PM

## 2018-12-12 NOTE — PROGRESS NOTES
Pt refused change of dressing again in AM. RN reeducated on risk for infection, pt still does not want dressing changed. Will continue to monitor.   Electronically signed by Deb Morrissey RN on 12/12/2018 at 5:41 AM

## 2018-12-12 NOTE — CONSULTS
Clinical Pharmacy Note  Heparin Dosing Consult    Sandoval Almonte is a 62 y.o. male ordered heparin per low dose nomogram by Dr. Keren Wu. Lab Results   Component Value Date    APTT 78.2 12/11/2018     Lab Results   Component Value Date    HGB 12.8 12/11/2018    HCT 40.4 12/11/2018     12/11/2018       Ht Readings from Last 1 Encounters:   12/10/18 6' 2\" (1.88 m)        Wt Readings from Last 1 Encounters:   12/11/18 174 lb 2.6 oz (79 kg)     Dosing weight: 79 kg    Assessment/Plan:    Continue infusion rate: 9.5 mL/hr  Next aPTT: 12/12/18 0130    Pharmacy will continue to monitor adjust heparin based on aPTT results using nomogram below:     LOW DOSE HEPARIN PROTOCOL (ACS/STEMI/A FIB)     Initial Bolus: 60 units/kg Max Bolus: 4,000 units       Initial Rate: 12 units/kg/hr Max Initial Rate: 1,000 units/hr     aPTT < 36   Heparin 60 units/kg bolus Increase infusion by 4 units/kg/hr       (maximum 4,000 units)   aPTT 37-53   Heparin 30 units/kg bolus Increase infusion by 2 units/kg/hr       (maximum 2,000 units)   aPTT 54-90   No bolus   No change   aPTT 91-98   No bolus   Decrease infusion by 2 units/kg/hr   aPTT    Hold heparin for 1 hour Decrease infusion by 3 units/kg/hr   aPTT 108-115  Hold heparin for 1 hour Decrease infusion by 4 units/kg/hr   aPTT > 116   Hold heparin for 1 hour Decrease infusion by 6 units/kg/hr    Obtain aPTT 6 hours after initial bolus and 6 hours after any dose change until two consecutive therapeutic aPTTs are achieved - then daily.

## 2018-12-12 NOTE — PROGRESS NOTES
[facility] [address]   Daily Progress Note      Admit Date:  12/9/2018    CC: \"SOB\"  This is a 80-year-old male who has  presented to [facility] [place] with a nonhealing ulcer of his left  lower extremity which has been foul smelling. During this admission, he  also had blood test done which showed slightly elevated troponin and  significantly elevated BNP requiring this cardiac consultation. He  denies having any chest pain to me but has some off and on shortness of  breath. He has also had trouble with swelling in his legs.     Subjective:  Pt with no acute overnight events. Denies chest pain, palpitations, and dyspnea. Pt to undergo lower extremity angiogram tomorrow. Objective:   BP 96/61   Pulse 76   Temp 98.6 °F (37 °C) (Oral)   Resp 18   Ht 6' 2\" (1.88 m)   Wt 180 lb 12.4 oz (82 kg)   SpO2 97%   BMI 23.21 kg/m²     Intake/Output Summary (Last 24 hours) at 12/12/18 1445  Last data filed at 12/12/18 1007   Gross per 24 hour   Intake              720 ml   Output             1150 ml   Net             -430 ml     Wt Readings from Last 3 Encounters:   12/12/18 180 lb 12.4 oz (82 kg)     Telemetry:A flutter    Physical Exam:  General:  NAD, Awake, alert and oriented X4  Skin:  Warm and dry  Neck:  Supple, no JVP appreciated, no bruit  Chest:  Clear to auscultation, no wheezes/rhonchi/rales  Cardiovascular:   tachycardia S1S2  Abdomen:  Soft, nontender, +bowel sounds  Extremities:  Changes of chronic venous insufficiency & non healing ulcer Lt calf.     Cardiac Diagnosis:  diabetes, hypertension, hyperlipidemia, coronary artery disease and CHF    Medications:    piperacillin-tazobactam  3.375 g Intravenous Q8H    metoprolol succinate  50 mg Oral BID    spironolactone  12.5 mg Oral Daily    atorvastatin  80 mg Oral Daily    aspirin  81 mg Oral Daily    insulin glargine  12 Units Subcutaneous Nightly    lisinopril  40 mg Oral Daily    montelukast  10 mg Oral Nightly    pantoprazole  40 mg Oral QAM AC    raltegravir  400 mg Oral BID    sodium chloride flush  10 mL Intravenous 2 times per day    abacavir  600 mg Oral Daily    lamiVUDine  300 mg Oral Daily      sodium chloride      heparin (porcine) 9.5 mL/hr (12/11/18 1336)    dextrose       morphine, acetaminophen, oxyCODONE-acetaminophen, traMADol, diphenhydrAMINE, sodium chloride flush, glucose, dextrose, glucagon (rDNA), dextrose    Lab Data:  CBC:   Recent Labs      12/11/18   0518  12/11/18   1234  12/12/18   0838   WBC  9.8  11.3*  9.6   HGB  12.7*  12.8*  11.8*   PLT  242  257  231     BMP:  Recent Labs      12/10/18   0505  12/11/18   0518  12/12/18   0838   NA  141  133*  134*   K  3.6  3.6  3.5   CO2  24  25  21   BUN  26*  20  28*   CREATININE  1.2  1.5*  1.5*     LIVR:   Recent Labs      12/10/18   0505  12/11/18   0518  12/12/18   0838   AST  116*  67*  41*   ALT  239*  160*  111*     INR:  No results for input(s): INR in the last 72 hours. APTT:   Recent Labs      12/11/18   1934  12/12/18   0154  12/12/18   0838   APTT  78.2*  62.0*  55.5*     BNP:  No results for input(s): BNP in the last 72 hours. Imaging:Patient appears to be in atrial fibrillation.   Overall left ventricular systolic function appears severely reduced.   Ejection fraction is visually estimated to be 20% with diffuse hypokinesis   and minor regional variations. Normal left ventricular wall thickness and   cavity size. Spontaneous echo contrast noted in the left ventricle. Possible   small apical mural thrombus.   The right ventricle appears mildly dilated with severely reduced systolic   function.   The left atrium is severely dilated.   The right atrium is moderately dilated.   Trivial mitral regurgitation.   Mild tricuspid regurgitation.   Small pericardial effusion without tamponade physiology.   Possible ASD/PFO based on color Doppler.   [address] size is dilated (>2.1 cm) and collapses < 50% with respiration   consistent with elevated RA pressure (15

## 2018-12-12 NOTE — PROGRESS NOTES
Hospitalist Progress Note      PCP: [address] [place] [address] MD Alex    Date of Admission: 12/9/2018        Subjective: feels ok, no fever, chills, palpitation or chest pain. Medications:  Reviewed    Infusion Medications    heparin (porcine) 9.5 mL/hr (12/11/18 1336)    dextrose       Scheduled Medications    metoprolol succinate  50 mg Oral BID    spironolactone  12.5 mg Oral Daily    atorvastatin  80 mg Oral Daily    aspirin  81 mg Oral Daily    insulin glargine  12 Units Subcutaneous Nightly    lisinopril  40 mg Oral Daily    montelukast  10 mg Oral Nightly    pantoprazole  40 mg Oral QAM AC    raltegravir  400 mg Oral BID    sodium chloride flush  10 mL Intravenous 2 times per day    piperacillin-tazobactam  3.375 g Intravenous Q8H    abacavir  600 mg Oral Daily    lamiVUDine  300 mg Oral Daily     PRN Meds: morphine, acetaminophen, oxyCODONE-acetaminophen, traMADol, diphenhydrAMINE, sodium chloride flush, glucose, dextrose, glucagon (rDNA), dextrose      Intake/Output Summary (Last 24 hours) at 12/12/18 0806  Last data filed at 12/12/18 0550   Gross per 24 hour   Intake              480 ml   Output              750 ml   Net             -270 ml       Physical Exam Performed:    /76   Pulse 92   Temp 97.9 °F (36.6 °C) (Oral)   Resp 16   Ht 6' 2\" (1.88 m)   Wt 180 lb 12.4 oz (82 kg)   SpO2 97%   BMI 23.21 kg/m²     General appearance: No apparent distress  Neck: Supple  Respiratory:  Normal respiratory effort. Clear to auscultation, bilaterally without Rales/Wheezes/Rhonchi. Cardiovascular: Regular rate and rhythm with normal S1/S2 without murmurs, rubs or gallops. Abdomen: Soft, non-tender, non-distended with normal bowel sounds. Musculoskeletal: No clubbing. Left leg surgically dressed. Skin: Skin color, texture, turgor normal.  No rashes or lesions.   Neurologic:  No focal weakness   Psychiatric: Alert and oriented  Capillary Refill: Brisk,< 3 seconds   Peripheral

## 2018-12-13 PROBLEM — I70.25 ATHEROSCLEROSIS OF NATIVE ARTERY OF EXTREMITY WITH ULCERATION (HCC): Status: ACTIVE | Noted: 2018-12-13

## 2018-12-13 LAB
ALBUMIN SERPL-MCNC: 2.3 G/DL (ref 3.4–5)
ALP BLD-CCNC: 100 U/L (ref 40–129)
ALT SERPL-CCNC: 87 U/L (ref 10–40)
ANION GAP SERPL CALCULATED.3IONS-SCNC: 12 MMOL/L (ref 3–16)
APTT: 42.6 SEC (ref 26–36)
AST SERPL-CCNC: 29 U/L (ref 15–37)
BASOPHILS ABSOLUTE: 0 K/UL (ref 0–0.2)
BASOPHILS RELATIVE PERCENT: 0.4 %
BILIRUB SERPL-MCNC: 0.8 MG/DL (ref 0–1)
BILIRUBIN DIRECT: 0.5 MG/DL (ref 0–0.3)
BILIRUBIN, INDIRECT: 0.3 MG/DL (ref 0–1)
BUN BLDV-MCNC: 22 MG/DL (ref 7–20)
CALCIUM SERPL-MCNC: 7.6 MG/DL (ref 8.3–10.6)
CHLORIDE BLD-SCNC: 97 MMOL/L (ref 99–110)
CO2: 24 MMOL/L (ref 21–32)
CREAT SERPL-MCNC: 1.3 MG/DL (ref 0.9–1.3)
EOSINOPHILS ABSOLUTE: 0.1 K/UL (ref 0–0.6)
EOSINOPHILS RELATIVE PERCENT: 1.3 %
GFR AFRICAN AMERICAN: >60
GFR NON-AFRICAN AMERICAN: 57
GLUCOSE BLD-MCNC: 210 MG/DL (ref 70–99)
GLUCOSE BLD-MCNC: 264 MG/DL (ref 70–99)
GLUCOSE BLD-MCNC: 269 MG/DL (ref 70–99)
GLUCOSE BLD-MCNC: 291 MG/DL (ref 70–99)
GRAM STAIN RESULT: ABNORMAL
HCT VFR BLD CALC: 35.3 % (ref 40.5–52.5)
HEMOGLOBIN: 11.1 G/DL (ref 13.5–17.5)
LYMPHOCYTES ABSOLUTE: 1.1 K/UL (ref 1–5.1)
LYMPHOCYTES RELATIVE PERCENT: 12.2 %
MAGNESIUM: 1.5 MG/DL (ref 1.8–2.4)
MCH RBC QN AUTO: 26.1 PG (ref 26–34)
MCHC RBC AUTO-ENTMCNC: 31.5 G/DL (ref 31–36)
MCV RBC AUTO: 83 FL (ref 80–100)
MONOCYTES ABSOLUTE: 0.8 K/UL (ref 0–1.3)
MONOCYTES RELATIVE PERCENT: 8.6 %
NEUTROPHILS ABSOLUTE: 6.8 K/UL (ref 1.7–7.7)
NEUTROPHILS RELATIVE PERCENT: 77.5 %
ORGANISM: ABNORMAL
PDW BLD-RTO: 20.8 % (ref 12.4–15.4)
PERFORMED ON: ABNORMAL
PHOSPHORUS: 2.3 MG/DL (ref 2.5–4.9)
PLATELET # BLD: 211 K/UL (ref 135–450)
PMV BLD AUTO: 7.5 FL (ref 5–10.5)
POC ACT LR: 224 SEC
POTASSIUM REFLEX MAGNESIUM: 3.2 MMOL/L (ref 3.5–5.1)
RBC # BLD: 4.26 M/UL (ref 4.2–5.9)
SODIUM BLD-SCNC: 133 MMOL/L (ref 136–145)
TOTAL PROTEIN: 5.6 G/DL (ref 6.4–8.2)
WBC # BLD: 8.8 K/UL (ref 4–11)
WOUND/ABSCESS: ABNORMAL

## 2018-12-13 PROCEDURE — 047D3DZ DILATION OF LEFT COMMON ILIAC ARTERY WITH INTRALUMINAL DEVICE, PERCUTANEOUS APPROACH: ICD-10-PCS | Performed by: SURGERY

## 2018-12-13 PROCEDURE — 6370000000 HC RX 637 (ALT 250 FOR IP): Performed by: INTERNAL MEDICINE

## 2018-12-13 PROCEDURE — 6360000002 HC RX W HCPCS: Performed by: INTERNAL MEDICINE

## 2018-12-13 PROCEDURE — 2060000000 HC ICU INTERMEDIATE R&B

## 2018-12-13 PROCEDURE — C1894 INTRO/SHEATH, NON-LASER: HCPCS

## 2018-12-13 PROCEDURE — 6360000002 HC RX W HCPCS: Performed by: SURGERY

## 2018-12-13 PROCEDURE — 85347 COAGULATION TIME ACTIVATED: CPT

## 2018-12-13 PROCEDURE — C2628 CATHETER, OCCLUSION: HCPCS

## 2018-12-13 PROCEDURE — 75625 CONTRAST EXAM ABDOMINL AORTA: CPT | Performed by: SURGERY

## 2018-12-13 PROCEDURE — 76937 US GUIDE VASCULAR ACCESS: CPT | Performed by: SURGERY

## 2018-12-13 PROCEDURE — 80076 HEPATIC FUNCTION PANEL: CPT

## 2018-12-13 PROCEDURE — 36415 COLL VENOUS BLD VENIPUNCTURE: CPT

## 2018-12-13 PROCEDURE — 2580000003 HC RX 258: Performed by: INTERNAL MEDICINE

## 2018-12-13 PROCEDURE — 75774 ARTERY X-RAY EACH VESSEL: CPT | Performed by: SURGERY

## 2018-12-13 PROCEDURE — 75710 ARTERY X-RAYS ARM/LEG: CPT | Performed by: SURGERY

## 2018-12-13 PROCEDURE — APPSS30 APP SPLIT SHARED TIME 16-30 MINUTES: Performed by: NURSE PRACTITIONER

## 2018-12-13 PROCEDURE — 2709999900 HC NON-CHARGEABLE SUPPLY

## 2018-12-13 PROCEDURE — 37223 PR REVSC OPN/PRQ ILIAC ART W/STNT & ANGIOP IPSILATL: CPT | Performed by: SURGERY

## 2018-12-13 PROCEDURE — 047J3DZ DILATION OF LEFT EXTERNAL ILIAC ARTERY WITH INTRALUMINAL DEVICE, PERCUTANEOUS APPROACH: ICD-10-PCS | Performed by: SURGERY

## 2018-12-13 PROCEDURE — 84100 ASSAY OF PHOSPHORUS: CPT

## 2018-12-13 PROCEDURE — 6370000000 HC RX 637 (ALT 250 FOR IP): Performed by: NURSE PRACTITIONER

## 2018-12-13 PROCEDURE — 80048 BASIC METABOLIC PNL TOTAL CA: CPT

## 2018-12-13 PROCEDURE — 85025 COMPLETE CBC W/AUTO DIFF WBC: CPT

## 2018-12-13 PROCEDURE — 2580000003 HC RX 258: Performed by: SURGERY

## 2018-12-13 PROCEDURE — C1769 GUIDE WIRE: HCPCS

## 2018-12-13 PROCEDURE — 047H3DZ DILATION OF RIGHT EXTERNAL ILIAC ARTERY WITH INTRALUMINAL DEVICE, PERCUTANEOUS APPROACH: ICD-10-PCS | Performed by: SURGERY

## 2018-12-13 PROCEDURE — 99153 MOD SED SAME PHYS/QHP EA: CPT | Performed by: SURGERY

## 2018-12-13 PROCEDURE — B4101ZZ FLUOROSCOPY OF ABDOMINAL AORTA USING LOW OSMOLAR CONTRAST: ICD-10-PCS | Performed by: SURGERY

## 2018-12-13 PROCEDURE — 75716 ARTERY X-RAYS ARMS/LEGS: CPT | Performed by: SURGERY

## 2018-12-13 PROCEDURE — 2780000010 HC IMPLANT OTHER

## 2018-12-13 PROCEDURE — 99152 MOD SED SAME PHYS/QHP 5/>YRS: CPT | Performed by: SURGERY

## 2018-12-13 PROCEDURE — 37221 HC ILIAC TERRITORY PLASTY STENT: CPT | Performed by: SURGERY

## 2018-12-13 PROCEDURE — 6370000000 HC RX 637 (ALT 250 FOR IP): Performed by: SURGERY

## 2018-12-13 PROCEDURE — 37223 HC ILIAC TERRITORY ADDL STENT: CPT | Performed by: SURGERY

## 2018-12-13 PROCEDURE — C1887 CATHETER, GUIDING: HCPCS

## 2018-12-13 PROCEDURE — 047C3DZ DILATION OF RIGHT COMMON ILIAC ARTERY WITH INTRALUMINAL DEVICE, PERCUTANEOUS APPROACH: ICD-10-PCS | Performed by: SURGERY

## 2018-12-13 PROCEDURE — 2720000010 HC SURG SUPPLY STERILE

## 2018-12-13 PROCEDURE — 99232 SBSQ HOSP IP/OBS MODERATE 35: CPT | Performed by: INTERNAL MEDICINE

## 2018-12-13 PROCEDURE — C1725 CATH, TRANSLUMIN NON-LASER: HCPCS

## 2018-12-13 PROCEDURE — 6360000004 HC RX CONTRAST MEDICATION: Performed by: SURGERY

## 2018-12-13 PROCEDURE — 37221 PR REVSC OPN/PRQ ILIAC ART W/STNT PLMT & ANGIOPLSTY: CPT | Performed by: SURGERY

## 2018-12-13 PROCEDURE — B41G1ZZ FLUOROSCOPY OF LEFT LOWER EXTREMITY ARTERIES USING LOW OSMOLAR CONTRAST: ICD-10-PCS | Performed by: SURGERY

## 2018-12-13 PROCEDURE — APPNB30 APP NON BILLABLE TIME 0-30 MINS: Performed by: NURSE PRACTITIONER

## 2018-12-13 PROCEDURE — 85730 THROMBOPLASTIN TIME PARTIAL: CPT

## 2018-12-13 PROCEDURE — C1876 STENT, NON-COA/NON-COV W/DEL: HCPCS

## 2018-12-13 PROCEDURE — 83735 ASSAY OF MAGNESIUM: CPT

## 2018-12-13 RX ORDER — ACETAMINOPHEN 325 MG/1
650 TABLET ORAL EVERY 4 HOURS PRN
Status: DISCONTINUED | OUTPATIENT
Start: 2018-12-13 | End: 2018-12-27 | Stop reason: HOSPADM

## 2018-12-13 RX ORDER — POTASSIUM CHLORIDE 20 MEQ/1
20 TABLET, EXTENDED RELEASE ORAL 2 TIMES DAILY
Status: DISCONTINUED | OUTPATIENT
Start: 2018-12-13 | End: 2018-12-15

## 2018-12-13 RX ORDER — FUROSEMIDE 40 MG/1
40 TABLET ORAL 2 TIMES DAILY
Status: DISCONTINUED | OUTPATIENT
Start: 2018-12-13 | End: 2018-12-14

## 2018-12-13 RX ORDER — SPIRONOLACTONE 25 MG/1
25 TABLET ORAL DAILY
Status: DISCONTINUED | OUTPATIENT
Start: 2018-12-14 | End: 2018-12-19

## 2018-12-13 RX ORDER — POTASSIUM CHLORIDE 750 MG/1
20 TABLET, FILM COATED, EXTENDED RELEASE ORAL DAILY
Status: DISCONTINUED | OUTPATIENT
Start: 2018-12-13 | End: 2018-12-13 | Stop reason: ALTCHOICE

## 2018-12-13 RX ORDER — CALCIUM CARBONATE 500(1250)
500 TABLET ORAL 2 TIMES DAILY
Status: DISCONTINUED | OUTPATIENT
Start: 2018-12-13 | End: 2018-12-27 | Stop reason: HOSPADM

## 2018-12-13 RX ORDER — ONDANSETRON 2 MG/ML
4 INJECTION INTRAMUSCULAR; INTRAVENOUS EVERY 6 HOURS PRN
Status: DISCONTINUED | OUTPATIENT
Start: 2018-12-13 | End: 2018-12-19

## 2018-12-13 RX ORDER — IODIXANOL 320 MG/ML
200 INJECTION, SOLUTION INTRAVASCULAR
Status: COMPLETED | OUTPATIENT
Start: 2018-12-13 | End: 2018-12-13

## 2018-12-13 RX ORDER — FENTANYL CITRATE 50 UG/ML
25 INJECTION, SOLUTION INTRAMUSCULAR; INTRAVENOUS
Status: ACTIVE | OUTPATIENT
Start: 2018-12-13 | End: 2018-12-13

## 2018-12-13 RX ORDER — IODIXANOL 320 MG/ML
100 INJECTION, SOLUTION INTRAVASCULAR
Status: DISCONTINUED | OUTPATIENT
Start: 2018-12-13 | End: 2018-12-13

## 2018-12-13 RX ORDER — SODIUM CHLORIDE 9 MG/ML
INJECTION, SOLUTION INTRAVENOUS CONTINUOUS
Status: DISCONTINUED | OUTPATIENT
Start: 2018-12-13 | End: 2018-12-22

## 2018-12-13 RX ORDER — 0.9 % SODIUM CHLORIDE 0.9 %
500 INTRAVENOUS SOLUTION INTRAVENOUS PRN
Status: DISCONTINUED | OUTPATIENT
Start: 2018-12-13 | End: 2018-12-19

## 2018-12-13 RX ORDER — MORPHINE SULFATE 2 MG/ML
2 INJECTION, SOLUTION INTRAMUSCULAR; INTRAVENOUS
Status: ACTIVE | OUTPATIENT
Start: 2018-12-13 | End: 2018-12-13

## 2018-12-13 RX ORDER — POTASSIUM CHLORIDE 1.5 G/1.77G
40 POWDER, FOR SOLUTION ORAL ONCE
Status: COMPLETED | OUTPATIENT
Start: 2018-12-13 | End: 2018-12-13

## 2018-12-13 RX ADMIN — RALTEGRAVIR 400 MG: 400 TABLET, FILM COATED ORAL at 22:00

## 2018-12-13 RX ADMIN — PIPERACILLIN SODIUM,TAZOBACTAM SODIUM 3.38 G: 3; .375 INJECTION, POWDER, FOR SOLUTION INTRAVENOUS at 06:00

## 2018-12-13 RX ADMIN — SODIUM CHLORIDE: 9 INJECTION, SOLUTION INTRAVENOUS at 18:47

## 2018-12-13 RX ADMIN — ASPIRIN 81 MG 81 MG: 81 TABLET ORAL at 10:29

## 2018-12-13 RX ADMIN — PANTOPRAZOLE SODIUM 40 MG: 40 TABLET, DELAYED RELEASE ORAL at 10:21

## 2018-12-13 RX ADMIN — RALTEGRAVIR 400 MG: 400 TABLET, FILM COATED ORAL at 10:22

## 2018-12-13 RX ADMIN — POTASSIUM CHLORIDE 40 MEQ: 1.5 POWDER, FOR SOLUTION ORAL at 16:51

## 2018-12-13 RX ADMIN — VITAMIN D, TAB 1000IU (100/BT) 1000 UNITS: 25 TAB at 16:51

## 2018-12-13 RX ADMIN — MORPHINE SULFATE 4 MG: 4 INJECTION INTRAVENOUS at 08:28

## 2018-12-13 RX ADMIN — OXYCODONE AND ACETAMINOPHEN 1 TABLET: 5; 325 TABLET ORAL at 20:26

## 2018-12-13 RX ADMIN — INSULIN GLARGINE 12 UNITS: 100 INJECTION, SOLUTION SUBCUTANEOUS at 22:55

## 2018-12-13 RX ADMIN — PIPERACILLIN SODIUM,TAZOBACTAM SODIUM 3.38 G: 3; .375 INJECTION, POWDER, FOR SOLUTION INTRAVENOUS at 13:55

## 2018-12-13 RX ADMIN — METOPROLOL SUCCINATE 50 MG: 50 TABLET, EXTENDED RELEASE ORAL at 10:21

## 2018-12-13 RX ADMIN — MONTELUKAST SODIUM 10 MG: 10 TABLET, COATED ORAL at 20:27

## 2018-12-13 RX ADMIN — POTASSIUM CHLORIDE 20 MEQ: 20 TABLET, EXTENDED RELEASE ORAL at 20:26

## 2018-12-13 RX ADMIN — LISINOPRIL 40 MG: 20 TABLET ORAL at 10:21

## 2018-12-13 RX ADMIN — CALCIUM 500 MG: 500 TABLET ORAL at 20:27

## 2018-12-13 RX ADMIN — METOPROLOL SUCCINATE 50 MG: 50 TABLET, EXTENDED RELEASE ORAL at 20:27

## 2018-12-13 RX ADMIN — LAMIVUDINE 300 MG: 150 TABLET, FILM COATED ORAL at 10:22

## 2018-12-13 RX ADMIN — Medication 10 ML: at 20:27

## 2018-12-13 RX ADMIN — SPIRONOLACTONE 12.5 MG: 25 TABLET ORAL at 10:21

## 2018-12-13 RX ADMIN — HEPARIN SODIUM 9.5 ML/HR: 10000 INJECTION, SOLUTION INTRAVENOUS at 20:31

## 2018-12-13 RX ADMIN — ATORVASTATIN CALCIUM 80 MG: 80 TABLET, FILM COATED ORAL at 16:51

## 2018-12-13 RX ADMIN — IODIXANOL 200 ML: 320 INJECTION, SOLUTION INTRAVASCULAR at 16:23

## 2018-12-13 RX ADMIN — PIPERACILLIN SODIUM,TAZOBACTAM SODIUM 3.38 G: 3; .375 INJECTION, POWDER, FOR SOLUTION INTRAVENOUS at 23:03

## 2018-12-13 RX ADMIN — ABACAVIR 600 MG: 300 TABLET, FILM COATED ORAL at 10:21

## 2018-12-13 RX ADMIN — FUROSEMIDE 40 MG: 40 TABLET ORAL at 20:26

## 2018-12-13 RX ADMIN — MAGNESIUM SULFATE HEPTAHYDRATE 3 G: 500 INJECTION, SOLUTION INTRAMUSCULAR; INTRAVENOUS at 10:21

## 2018-12-13 ASSESSMENT — PAIN DESCRIPTION - LOCATION: LOCATION: LEG

## 2018-12-13 ASSESSMENT — PAIN SCALES - WONG BAKER
WONGBAKER_NUMERICALRESPONSE: 2

## 2018-12-13 ASSESSMENT — PAIN SCALES - GENERAL
PAINLEVEL_OUTOF10: 9
PAINLEVEL_OUTOF10: 0
PAINLEVEL_OUTOF10: 0
PAINLEVEL_OUTOF10: 9

## 2018-12-13 ASSESSMENT — PAIN DESCRIPTION - PROGRESSION
CLINICAL_PROGRESSION: NOT CHANGED

## 2018-12-13 ASSESSMENT — PAIN DESCRIPTION - ORIENTATION: ORIENTATION: LEFT

## 2018-12-13 ASSESSMENT — PAIN DESCRIPTION - PAIN TYPE: TYPE: ACUTE PAIN

## 2018-12-13 NOTE — PROGRESS NOTES
Patient arrived to room 5102 via bed from cath lab at 1620. VSS. No signs of new blood on dressings. Blood on pad and in groin but no new blood. Patient awake, alert and oriented x 4.   Electronically signed by Precious Ellis RN on 12/13/2018 at 5:54 PM

## 2018-12-13 NOTE — PRE SEDATION
Sedation Pre-Procedure Note    Patient Name: Nicole Phipps   YOB: 1960  Room/Bed: I9V-1883/3130-01  Medical Record Number: [id number]  Date: 12/13/2018   Time: 2:22 PM       Indication:  PAD    Consent: I have discussed with the patient and/or the patient representative the indication, alternatives, and the possible risks and/or complications of the planned procedure and the anesthesia methods. The patient and/or patient representative appear to understand and agree to proceed. Vital Signs:   Vitals:    12/13/18 0807   BP: 108/74   Pulse: 93   Resp: 16   Temp: 97.4 °F (36.3 °C)   SpO2: 99%       Past Medical History:   has a past medical history of Anxiety; Chronic CHF (congestive heart failure) ([facility] Diabetes mellitus ([facility] Hep B w/o coma; Hepatitis C without hepatic coma; HIV (human immunodeficiency virus infection) ([facility] Hyperlipidemia; and Hypertension. Past Surgical History:   has a past surgical history that includes incision and drainage (Left, 12/10/2018). Medications:   Scheduled Meds:    potassium chloride  40 mEq Oral Once    piperacillin-tazobactam  3.375 g Intravenous Q8H    metoprolol succinate  50 mg Oral BID    spironolactone  12.5 mg Oral Daily    atorvastatin  80 mg Oral Daily    aspirin  81 mg Oral Daily    insulin glargine  12 Units Subcutaneous Nightly    lisinopril  40 mg Oral Daily    montelukast  10 mg Oral Nightly    pantoprazole  40 mg Oral QAM AC    raltegravir  400 mg Oral BID    sodium chloride flush  10 mL Intravenous 2 times per day    abacavir  600 mg Oral Daily    lamiVUDine  300 mg Oral Daily     Continuous Infusions:    heparin (porcine) 9.5 mL/hr (12/12/18 1722)    dextrose       PRN Meds: morphine, acetaminophen, oxyCODONE-acetaminophen, traMADol, diphenhydrAMINE, sodium chloride flush, glucose, dextrose, glucagon (rDNA), dextrose  Home Meds:   Prior to Admission medications    Medication Sig Start Date End Date Taking?  Authorizing Provider French MD Janice on 12/13/2018 at 2:22 PM

## 2018-12-13 NOTE — H&P
H&P Update    Patient's History and Physical from December 10, 2018 was reviewed. Patient examined. There has been no change.     Lay Bird

## 2018-12-14 LAB
ALBUMIN SERPL-MCNC: 2.5 G/DL (ref 3.4–5)
ALP BLD-CCNC: 104 U/L (ref 40–129)
ALT SERPL-CCNC: 69 U/L (ref 10–40)
ANION GAP SERPL CALCULATED.3IONS-SCNC: 11 MMOL/L (ref 3–16)
APTT: 39.7 SEC (ref 26–36)
APTT: 56.1 SEC (ref 26–36)
APTT: 56.1 SEC (ref 26–36)
APTT: 59.8 SEC (ref 26–36)
AST SERPL-CCNC: 24 U/L (ref 15–37)
BASOPHILS ABSOLUTE: 0.1 K/UL (ref 0–0.2)
BASOPHILS RELATIVE PERCENT: 0.7 %
BILIRUB SERPL-MCNC: 0.7 MG/DL (ref 0–1)
BILIRUBIN DIRECT: 0.4 MG/DL (ref 0–0.3)
BILIRUBIN, INDIRECT: 0.3 MG/DL (ref 0–1)
BUN BLDV-MCNC: 16 MG/DL (ref 7–20)
CALCIUM SERPL-MCNC: 7.7 MG/DL (ref 8.3–10.6)
CHLORIDE BLD-SCNC: 101 MMOL/L (ref 99–110)
CO2: 24 MMOL/L (ref 21–32)
CREAT SERPL-MCNC: 1.2 MG/DL (ref 0.9–1.3)
EOSINOPHILS ABSOLUTE: 0.1 K/UL (ref 0–0.6)
EOSINOPHILS RELATIVE PERCENT: 1.4 %
GFR AFRICAN AMERICAN: >60
GFR NON-AFRICAN AMERICAN: >60
GLUCOSE BLD-MCNC: 263 MG/DL (ref 70–99)
GLUCOSE BLD-MCNC: 287 MG/DL (ref 70–99)
GLUCOSE BLD-MCNC: 312 MG/DL (ref 70–99)
GLUCOSE BLD-MCNC: 312 MG/DL (ref 70–99)
GLUCOSE BLD-MCNC: 324 MG/DL (ref 70–99)
HCT VFR BLD CALC: 38 % (ref 40.5–52.5)
HEMOGLOBIN: 11.9 G/DL (ref 13.5–17.5)
LYMPHOCYTES ABSOLUTE: 1 K/UL (ref 1–5.1)
LYMPHOCYTES RELATIVE PERCENT: 12.6 %
MAGNESIUM: 1.8 MG/DL (ref 1.8–2.4)
MCH RBC QN AUTO: 26.3 PG (ref 26–34)
MCHC RBC AUTO-ENTMCNC: 31.3 G/DL (ref 31–36)
MCV RBC AUTO: 83.9 FL (ref 80–100)
MONOCYTES ABSOLUTE: 0.8 K/UL (ref 0–1.3)
MONOCYTES RELATIVE PERCENT: 9.3 %
NEUTROPHILS ABSOLUTE: 6.3 K/UL (ref 1.7–7.7)
NEUTROPHILS RELATIVE PERCENT: 76 %
PDW BLD-RTO: 21 % (ref 12.4–15.4)
PERFORMED ON: ABNORMAL
PHOSPHORUS: 1.8 MG/DL (ref 2.5–4.9)
PLATELET # BLD: 212 K/UL (ref 135–450)
PMV BLD AUTO: 7.6 FL (ref 5–10.5)
POTASSIUM REFLEX MAGNESIUM: 3.6 MMOL/L (ref 3.5–5.1)
RBC # BLD: 4.53 M/UL (ref 4.2–5.9)
SODIUM BLD-SCNC: 136 MMOL/L (ref 136–145)
TOTAL PROTEIN: 6 G/DL (ref 6.4–8.2)
WBC # BLD: 8.3 K/UL (ref 4–11)

## 2018-12-14 PROCEDURE — 80048 BASIC METABOLIC PNL TOTAL CA: CPT

## 2018-12-14 PROCEDURE — APPNB60 APP NON BILLABLE TIME 46-60 MINS: Performed by: NURSE PRACTITIONER

## 2018-12-14 PROCEDURE — 6360000002 HC RX W HCPCS: Performed by: SURGERY

## 2018-12-14 PROCEDURE — 84100 ASSAY OF PHOSPHORUS: CPT

## 2018-12-14 PROCEDURE — 85025 COMPLETE CBC W/AUTO DIFF WBC: CPT

## 2018-12-14 PROCEDURE — 2500000003 HC RX 250 WO HCPCS: Performed by: INTERNAL MEDICINE

## 2018-12-14 PROCEDURE — 2580000003 HC RX 258: Performed by: INTERNAL MEDICINE

## 2018-12-14 PROCEDURE — 6370000000 HC RX 637 (ALT 250 FOR IP): Performed by: INTERNAL MEDICINE

## 2018-12-14 PROCEDURE — 93971 EXTREMITY STUDY: CPT

## 2018-12-14 PROCEDURE — 83735 ASSAY OF MAGNESIUM: CPT

## 2018-12-14 PROCEDURE — 85730 THROMBOPLASTIN TIME PARTIAL: CPT

## 2018-12-14 PROCEDURE — 6370000000 HC RX 637 (ALT 250 FOR IP): Performed by: NURSE PRACTITIONER

## 2018-12-14 PROCEDURE — 2060000000 HC ICU INTERMEDIATE R&B

## 2018-12-14 PROCEDURE — 6370000000 HC RX 637 (ALT 250 FOR IP): Performed by: SURGERY

## 2018-12-14 PROCEDURE — 94760 N-INVAS EAR/PLS OXIMETRY 1: CPT

## 2018-12-14 PROCEDURE — 99222 1ST HOSP IP/OBS MODERATE 55: CPT | Performed by: INTERNAL MEDICINE

## 2018-12-14 PROCEDURE — APPSS30 APP SPLIT SHARED TIME 16-30 MINUTES: Performed by: NURSE PRACTITIONER

## 2018-12-14 PROCEDURE — 36415 COLL VENOUS BLD VENIPUNCTURE: CPT

## 2018-12-14 PROCEDURE — 80076 HEPATIC FUNCTION PANEL: CPT

## 2018-12-14 PROCEDURE — 6360000002 HC RX W HCPCS: Performed by: INTERNAL MEDICINE

## 2018-12-14 PROCEDURE — 2580000003 HC RX 258: Performed by: SURGERY

## 2018-12-14 RX ORDER — DEXTROSE MONOHYDRATE 25 G/50ML
12.5 INJECTION, SOLUTION INTRAVENOUS PRN
Status: DISCONTINUED | OUTPATIENT
Start: 2018-12-14 | End: 2018-12-27 | Stop reason: HOSPADM

## 2018-12-14 RX ORDER — NICOTINE POLACRILEX 4 MG
15 LOZENGE BUCCAL PRN
Status: DISCONTINUED | OUTPATIENT
Start: 2018-12-14 | End: 2018-12-27 | Stop reason: HOSPADM

## 2018-12-14 RX ORDER — FUROSEMIDE 40 MG/1
40 TABLET ORAL DAILY
Status: DISCONTINUED | OUTPATIENT
Start: 2018-12-14 | End: 2018-12-16

## 2018-12-14 RX ORDER — DEXTROSE MONOHYDRATE 50 MG/ML
100 INJECTION, SOLUTION INTRAVENOUS PRN
Status: DISCONTINUED | OUTPATIENT
Start: 2018-12-14 | End: 2018-12-27 | Stop reason: HOSPADM

## 2018-12-14 RX ORDER — HEPARIN SODIUM 1000 [USP'U]/ML
2000 INJECTION, SOLUTION INTRAVENOUS; SUBCUTANEOUS ONCE
Status: COMPLETED | OUTPATIENT
Start: 2018-12-14 | End: 2018-12-14

## 2018-12-14 RX ORDER — INSULIN GLARGINE 100 [IU]/ML
15 INJECTION, SOLUTION SUBCUTANEOUS NIGHTLY
Status: DISCONTINUED | OUTPATIENT
Start: 2018-12-14 | End: 2018-12-18

## 2018-12-14 RX ORDER — INSULIN GLARGINE 100 [IU]/ML
3 INJECTION, SOLUTION SUBCUTANEOUS ONCE
Status: COMPLETED | OUTPATIENT
Start: 2018-12-14 | End: 2018-12-14

## 2018-12-14 RX ADMIN — INSULIN LISPRO 3 UNITS: 100 INJECTION, SOLUTION INTRAVENOUS; SUBCUTANEOUS at 14:25

## 2018-12-14 RX ADMIN — INSULIN LISPRO 4 UNITS: 100 INJECTION, SOLUTION INTRAVENOUS; SUBCUTANEOUS at 14:26

## 2018-12-14 RX ADMIN — PANTOPRAZOLE SODIUM 40 MG: 40 TABLET, DELAYED RELEASE ORAL at 05:40

## 2018-12-14 RX ADMIN — OXYCODONE AND ACETAMINOPHEN 1 TABLET: 5; 325 TABLET ORAL at 15:41

## 2018-12-14 RX ADMIN — Medication 10 ML: at 09:04

## 2018-12-14 RX ADMIN — VITAMIN D, TAB 1000IU (100/BT) 1000 UNITS: 25 TAB at 09:04

## 2018-12-14 RX ADMIN — LAMIVUDINE 300 MG: 150 TABLET, FILM COATED ORAL at 09:50

## 2018-12-14 RX ADMIN — RALTEGRAVIR 400 MG: 400 TABLET, FILM COATED ORAL at 09:50

## 2018-12-14 RX ADMIN — METOPROLOL SUCCINATE 50 MG: 50 TABLET, EXTENDED RELEASE ORAL at 09:03

## 2018-12-14 RX ADMIN — MONTELUKAST SODIUM 10 MG: 10 TABLET, COATED ORAL at 21:28

## 2018-12-14 RX ADMIN — SODIUM CHLORIDE: 9 INJECTION, SOLUTION INTRAVENOUS at 15:56

## 2018-12-14 RX ADMIN — POTASSIUM CHLORIDE 20 MEQ: 20 TABLET, EXTENDED RELEASE ORAL at 09:51

## 2018-12-14 RX ADMIN — SACUBITRIL AND VALSARTAN 1 TABLET: 24; 26 TABLET, FILM COATED ORAL at 21:28

## 2018-12-14 RX ADMIN — POTASSIUM PHOSPHATE, MONOBASIC AND POTASSIUM PHOSPHATE, DIBASIC 15 MMOL: 224; 236 INJECTION, SOLUTION, CONCENTRATE INTRAVENOUS at 15:48

## 2018-12-14 RX ADMIN — INSULIN LISPRO 3 UNITS: 100 INJECTION, SOLUTION INTRAVENOUS; SUBCUTANEOUS at 18:54

## 2018-12-14 RX ADMIN — ABACAVIR 600 MG: 300 TABLET, FILM COATED ORAL at 09:51

## 2018-12-14 RX ADMIN — POTASSIUM CHLORIDE 20 MEQ: 20 TABLET, EXTENDED RELEASE ORAL at 21:28

## 2018-12-14 RX ADMIN — CALCIUM 500 MG: 500 TABLET ORAL at 21:28

## 2018-12-14 RX ADMIN — PIPERACILLIN SODIUM,TAZOBACTAM SODIUM 3.38 G: 3; .375 INJECTION, POWDER, FOR SOLUTION INTRAVENOUS at 21:32

## 2018-12-14 RX ADMIN — HEPARIN SODIUM 2000 UNITS: 1000 INJECTION INTRAVENOUS; SUBCUTANEOUS at 15:56

## 2018-12-14 RX ADMIN — ASPIRIN 81 MG 81 MG: 81 TABLET ORAL at 09:04

## 2018-12-14 RX ADMIN — RALTEGRAVIR 400 MG: 400 TABLET, FILM COATED ORAL at 21:29

## 2018-12-14 RX ADMIN — OXYCODONE AND ACETAMINOPHEN 1 TABLET: 5; 325 TABLET ORAL at 05:43

## 2018-12-14 RX ADMIN — CALCIUM 500 MG: 500 TABLET ORAL at 09:03

## 2018-12-14 RX ADMIN — PIPERACILLIN SODIUM,TAZOBACTAM SODIUM 3.38 G: 3; .375 INJECTION, POWDER, FOR SOLUTION INTRAVENOUS at 05:39

## 2018-12-14 RX ADMIN — HEPARIN SODIUM 11.1 ML/HR: 10000 INJECTION, SOLUTION INTRAVENOUS at 20:03

## 2018-12-14 RX ADMIN — INSULIN GLARGINE 3 UNITS: 100 INJECTION, SOLUTION SUBCUTANEOUS at 11:10

## 2018-12-14 RX ADMIN — SPIRONOLACTONE 25 MG: 25 TABLET ORAL at 09:51

## 2018-12-14 RX ADMIN — ATORVASTATIN CALCIUM 80 MG: 80 TABLET, FILM COATED ORAL at 09:03

## 2018-12-14 RX ADMIN — MORPHINE SULFATE 4 MG: 4 INJECTION INTRAVENOUS at 08:56

## 2018-12-14 RX ADMIN — INSULIN LISPRO 3 UNITS: 100 INJECTION, SOLUTION INTRAVENOUS; SUBCUTANEOUS at 18:53

## 2018-12-14 RX ADMIN — PIPERACILLIN SODIUM,TAZOBACTAM SODIUM 3.38 G: 3; .375 INJECTION, POWDER, FOR SOLUTION INTRAVENOUS at 15:11

## 2018-12-14 RX ADMIN — FUROSEMIDE 40 MG: 40 TABLET ORAL at 09:06

## 2018-12-14 ASSESSMENT — PAIN SCALES - GENERAL
PAINLEVEL_OUTOF10: 9
PAINLEVEL_OUTOF10: 0
PAINLEVEL_OUTOF10: 2
PAINLEVEL_OUTOF10: 7
PAINLEVEL_OUTOF10: 10

## 2018-12-14 ASSESSMENT — PAIN DESCRIPTION - LOCATION: LOCATION: LEG

## 2018-12-14 ASSESSMENT — PAIN DESCRIPTION - ORIENTATION: ORIENTATION: LEFT

## 2018-12-14 ASSESSMENT — PAIN DESCRIPTION - PAIN TYPE: TYPE: ACUTE PAIN

## 2018-12-14 NOTE — CARE COORDINATION
[employer]  Checking Home infusion benefits.    Chelsy Adams LPN  CTN with  [facility],  [address] [place] [address], Fax [phone]

## 2018-12-14 NOTE — PROGRESS NOTES
Clinical Pharmacy Note  Heparin Dosing       Lab Results   Component Value Date    APTT 56.1 12/14/2018     Lab Results   Component Value Date    HGB 11.9 12/14/2018    HCT 38.0 12/14/2018     12/14/2018       Current Infusion Rate: 9.5 mL/hr    Plan:  Continue current rate: 9.5 mL/hr  Next aPTT: 0830 on 12-14-18    Pharmacy will continue to monitor and adjust based on aPTT results.   Jaylyn Lima, [facility]  12/14/2018  10:13 AM

## 2018-12-14 NOTE — PROCEDURES
[address] [place] [address] 16                                 PROCEDURE NOTE    PATIENT NAME: Musa Morrison                        :        1960  MED REC NO:   [id number]                          ROOM:       5102  ACCOUNT NO:   [de-identified]                           ADMIT DATE: 2018  PROVIDER:     Aida Sanchez MD    ANGIOGRAPHY    DATE OF PROCEDURE:  2018    PREPROCEDURE DIAGNOSES:  1. Lower extremity atherosclerosis with ulceration. 2.  Peripheral arterial disease. POSTPROCEDURE DIAGNOSES:  1. Lower extremity atherosclerosis with ulceration. 2.  Peripheral arterial disease. PROCEDURES PERFORMED:  1. Ultrasound-guided access to the bilateral common femoral arteries. 2.  Abdominal aortogram.  3.  Angiogram of the left lower extremity. 4.  Selective angiogram of the left superficial femoral artery. 5.  Stent placement, right common iliac artery. 6.  Stent placement, left common iliac artery. 7.  Stent placement, right external iliac artery. 8.  Stent placement, left external iliac artery. SURGEON:  Aida Sanchez MD    ANESTHESIA:  Local with IV sedation. INDICATIONS:  The patient is a 63-year-old male with severe peripheral  arterial disease and nonhealing ulceration of the left lower extremity. Angiography is indicated to determine if he is a candidate for  revascularization. Endovascular revascularization is indicated in order  to improve healing and provide limb preservation. He is aware of the  risks, benefits, and alternatives of the procedure and willing to  proceed. PROCEDURE:  Using ultrasound guidance, percutaneous access was gained to  the right common femoral artery with a 5-Luxembourger sheath. A permanent  image of the access was obtained and recorded. Through the 5-Luxembourger  sheath, a pigtail catheter was advanced into the aorta to the level of  the L1 vertebral body.   At this

## 2018-12-14 NOTE — PROGRESS NOTES
Hospitalist Progress Note      PCP: Adriana Ivory MD    Date of Admission: 12/9/2018        Subjective: having some left led pain, scrotal edema and discomfort. Medications:  Reviewed    Infusion Medications    dextrose      sodium chloride 75 mL/hr at 12/13/18 1847    heparin (porcine) 9.5 mL/hr (12/13/18 2031)    dextrose       Scheduled Medications    sacubitril-valsartan  1 tablet Oral BID    furosemide  40 mg Oral Daily    insulin glargine  15 Units Subcutaneous Nightly    insulin glargine  3 Units Subcutaneous Once    insulin lispro  0-6 Units Subcutaneous TID WC    insulin lispro  0-3 Units Subcutaneous Nightly    spironolactone  25 mg Oral Daily    potassium chloride  20 mEq Oral BID    calcium elemental  500 mg Oral BID    vitamin D  1,000 Units Oral Daily    piperacillin-tazobactam  3.375 g Intravenous Q8H    metoprolol succinate  50 mg Oral BID    atorvastatin  80 mg Oral Daily    aspirin  81 mg Oral Daily    montelukast  10 mg Oral Nightly    pantoprazole  40 mg Oral QAM AC    raltegravir  400 mg Oral BID    sodium chloride flush  10 mL Intravenous 2 times per day    abacavir  600 mg Oral Daily    lamiVUDine  300 mg Oral Daily     PRN Meds: glucose, dextrose, glucagon (rDNA), dextrose, acetaminophen, sodium chloride, ondansetron, morphine, oxyCODONE-acetaminophen, traMADol, diphenhydrAMINE, sodium chloride flush, glucose, dextrose, glucagon (rDNA), dextrose      Intake/Output Summary (Last 24 hours) at 12/14/18 1007  Last data filed at 12/14/18 0951   Gross per 24 hour   Intake             1330 ml   Output             2815 ml   Net            -1485 ml       Physical Exam Performed:    /75   Pulse 53   Temp 98 °F (36.7 °C) (Oral)   Resp 18   Ht 6' 2\" (1.88 m)   Wt 185 lb 10 oz (84.2 kg)   SpO2 98%   BMI 23.83 kg/m²     General appearance: No apparent distress  Neck: Supple. Respiratory:  Normal respiratory effort.  Clear to auscultation,  Wounds, multiple open, lower extremity, left, initial encounter [S81.802A] 12/10/2018    Wound infection [T14. 8XXA, L08.9]     [facility] [I73.9]     Amphetamine abuse ([facility] [F15.10]     Elevated troponin [R74.8]     Human immunodeficiency virus (HIV) disease ([facility] [B20]     HIV (human immunodeficiency virus infection) ([facility] [B20]      1. Chronic Left Lower Limb Infection, with ulcers, deep, necrotic, continue Vanc and Zosyn for now, [facility] surgery consult. S/p deep debridement, post op day 4, S/P angiogram and stents placement. 2. A-flutter with RVR, improved, better controlled, repeating EKG, no chest pain, elevated troponin likely chronic,  Recently had a [facility]. cardiology input highly appreciated. Heparin drip. 3. Chronic Systolic CHF, EF 22%  4. DM2  5. HIV, continue home meds  6. Mildly elevated creatinine, gentle iv fluids, improved this am  7. Scrotal edema, likely heart failure related, could not exclude other etiologies, will consult urology.      DVT Prophylaxis: heparin   Diet: DIET GENERAL;  Code Status: Full Code        Dary Esquivel MD

## 2018-12-15 LAB
ALBUMIN SERPL-MCNC: 2.6 G/DL (ref 3.4–5)
ALP BLD-CCNC: 103 U/L (ref 40–129)
ALT SERPL-CCNC: 58 U/L (ref 10–40)
ANAEROBIC CULTURE: ABNORMAL
ANAEROBIC CULTURE: ABNORMAL
ANION GAP SERPL CALCULATED.3IONS-SCNC: 12 MMOL/L (ref 3–16)
APTT: 50.6 SEC (ref 26–36)
APTT: 80.1 SEC (ref 26–36)
APTT: 84.3 SEC (ref 26–36)
AST SERPL-CCNC: 24 U/L (ref 15–37)
BASOPHILS ABSOLUTE: 0.1 K/UL (ref 0–0.2)
BASOPHILS RELATIVE PERCENT: 0.6 %
BILIRUB SERPL-MCNC: 0.7 MG/DL (ref 0–1)
BILIRUBIN DIRECT: 0.4 MG/DL (ref 0–0.3)
BILIRUBIN, INDIRECT: 0.3 MG/DL (ref 0–1)
BLOOD CULTURE, ROUTINE: NORMAL
BUN BLDV-MCNC: 8 MG/DL (ref 7–20)
CALCIUM SERPL-MCNC: 8 MG/DL (ref 8.3–10.6)
CHLORIDE BLD-SCNC: 100 MMOL/L (ref 99–110)
CO2: 25 MMOL/L (ref 21–32)
CREAT SERPL-MCNC: 1.2 MG/DL (ref 0.9–1.3)
CULTURE, BLOOD 2: NORMAL
EOSINOPHILS ABSOLUTE: 0.1 K/UL (ref 0–0.6)
EOSINOPHILS RELATIVE PERCENT: 1.4 %
GFR AFRICAN AMERICAN: >60
GFR NON-AFRICAN AMERICAN: >60
GLUCOSE BLD-MCNC: 207 MG/DL (ref 70–99)
GLUCOSE BLD-MCNC: 222 MG/DL (ref 70–99)
GLUCOSE BLD-MCNC: 228 MG/DL (ref 70–99)
GLUCOSE BLD-MCNC: 248 MG/DL (ref 70–99)
GLUCOSE BLD-MCNC: 320 MG/DL (ref 70–99)
GRAM STAIN RESULT: ABNORMAL
HCT VFR BLD CALC: 36 % (ref 40.5–52.5)
HEMOGLOBIN: 11.2 G/DL (ref 13.5–17.5)
LYMPHOCYTES ABSOLUTE: 1.3 K/UL (ref 1–5.1)
LYMPHOCYTES RELATIVE PERCENT: 12.6 %
MAGNESIUM: 1.4 MG/DL (ref 1.8–2.4)
MCH RBC QN AUTO: 26 PG (ref 26–34)
MCHC RBC AUTO-ENTMCNC: 31.1 G/DL (ref 31–36)
MCV RBC AUTO: 83.6 FL (ref 80–100)
MONOCYTES ABSOLUTE: 1 K/UL (ref 0–1.3)
MONOCYTES RELATIVE PERCENT: 9.5 %
NEUTROPHILS ABSOLUTE: 7.9 K/UL (ref 1.7–7.7)
NEUTROPHILS RELATIVE PERCENT: 75.9 %
ORGANISM: ABNORMAL
PDW BLD-RTO: 21 % (ref 12.4–15.4)
PERFORMED ON: ABNORMAL
PHOSPHORUS: 1.9 MG/DL (ref 2.5–4.9)
PLATELET # BLD: 227 K/UL (ref 135–450)
PMV BLD AUTO: 7.6 FL (ref 5–10.5)
POTASSIUM REFLEX MAGNESIUM: 4.4 MMOL/L (ref 3.5–5.1)
RBC # BLD: 4.31 M/UL (ref 4.2–5.9)
SODIUM BLD-SCNC: 137 MMOL/L (ref 136–145)
TOTAL PROTEIN: 5.8 G/DL (ref 6.4–8.2)
WBC # BLD: 10.4 K/UL (ref 4–11)
WOUND/ABSCESS: ABNORMAL

## 2018-12-15 PROCEDURE — 85730 THROMBOPLASTIN TIME PARTIAL: CPT

## 2018-12-15 PROCEDURE — 6370000000 HC RX 637 (ALT 250 FOR IP): Performed by: NURSE PRACTITIONER

## 2018-12-15 PROCEDURE — 6360000002 HC RX W HCPCS: Performed by: INTERNAL MEDICINE

## 2018-12-15 PROCEDURE — 2580000003 HC RX 258: Performed by: SURGERY

## 2018-12-15 PROCEDURE — 6370000000 HC RX 637 (ALT 250 FOR IP): Performed by: INTERNAL MEDICINE

## 2018-12-15 PROCEDURE — 83735 ASSAY OF MAGNESIUM: CPT

## 2018-12-15 PROCEDURE — 2060000000 HC ICU INTERMEDIATE R&B

## 2018-12-15 PROCEDURE — 80076 HEPATIC FUNCTION PANEL: CPT

## 2018-12-15 PROCEDURE — 84100 ASSAY OF PHOSPHORUS: CPT

## 2018-12-15 PROCEDURE — 2580000003 HC RX 258: Performed by: INTERNAL MEDICINE

## 2018-12-15 PROCEDURE — 85025 COMPLETE CBC W/AUTO DIFF WBC: CPT

## 2018-12-15 PROCEDURE — 6370000000 HC RX 637 (ALT 250 FOR IP): Performed by: SURGERY

## 2018-12-15 PROCEDURE — 80048 BASIC METABOLIC PNL TOTAL CA: CPT

## 2018-12-15 PROCEDURE — 2500000003 HC RX 250 WO HCPCS: Performed by: INTERNAL MEDICINE

## 2018-12-15 PROCEDURE — 94760 N-INVAS EAR/PLS OXIMETRY 1: CPT

## 2018-12-15 PROCEDURE — 99232 SBSQ HOSP IP/OBS MODERATE 35: CPT | Performed by: INTERNAL MEDICINE

## 2018-12-15 RX ORDER — DIGOXIN 125 MCG
125 TABLET ORAL DAILY
Status: DISCONTINUED | OUTPATIENT
Start: 2018-12-15 | End: 2018-12-27 | Stop reason: HOSPADM

## 2018-12-15 RX ORDER — FUROSEMIDE 10 MG/ML
80 INJECTION INTRAMUSCULAR; INTRAVENOUS 2 TIMES DAILY
Status: DISCONTINUED | OUTPATIENT
Start: 2018-12-15 | End: 2018-12-19

## 2018-12-15 RX ORDER — HEPARIN SODIUM 1000 [USP'U]/ML
2000 INJECTION, SOLUTION INTRAVENOUS; SUBCUTANEOUS ONCE
Status: COMPLETED | OUTPATIENT
Start: 2018-12-15 | End: 2018-12-15

## 2018-12-15 RX ORDER — POTASSIUM CHLORIDE 20 MEQ/1
20 TABLET, EXTENDED RELEASE ORAL 2 TIMES DAILY
Status: DISCONTINUED | OUTPATIENT
Start: 2018-12-16 | End: 2018-12-18

## 2018-12-15 RX ADMIN — INSULIN LISPRO 2 UNITS: 100 INJECTION, SOLUTION INTRAVENOUS; SUBCUTANEOUS at 09:21

## 2018-12-15 RX ADMIN — INSULIN LISPRO 2 UNITS: 100 INJECTION, SOLUTION INTRAVENOUS; SUBCUTANEOUS at 00:22

## 2018-12-15 RX ADMIN — METOPROLOL SUCCINATE 50 MG: 50 TABLET, EXTENDED RELEASE ORAL at 00:21

## 2018-12-15 RX ADMIN — LAMIVUDINE 300 MG: 150 TABLET, FILM COATED ORAL at 09:20

## 2018-12-15 RX ADMIN — POTASSIUM PHOSPHATE, MONOBASIC AND POTASSIUM PHOSPHATE, DIBASIC 10 MMOL: 224; 236 INJECTION, SOLUTION, CONCENTRATE INTRAVENOUS at 11:00

## 2018-12-15 RX ADMIN — HEPARIN SODIUM 12.7 ML/HR: 10000 INJECTION, SOLUTION INTRAVENOUS at 17:42

## 2018-12-15 RX ADMIN — PIPERACILLIN SODIUM,TAZOBACTAM SODIUM 3.38 G: 3; .375 INJECTION, POWDER, FOR SOLUTION INTRAVENOUS at 06:01

## 2018-12-15 RX ADMIN — SPIRONOLACTONE 25 MG: 25 TABLET ORAL at 09:17

## 2018-12-15 RX ADMIN — INSULIN LISPRO 3 UNITS: 100 INJECTION, SOLUTION INTRAVENOUS; SUBCUTANEOUS at 09:27

## 2018-12-15 RX ADMIN — SACUBITRIL AND VALSARTAN 1 TABLET: 24; 26 TABLET, FILM COATED ORAL at 22:59

## 2018-12-15 RX ADMIN — INSULIN LISPRO 3 UNITS: 100 INJECTION, SOLUTION INTRAVENOUS; SUBCUTANEOUS at 17:47

## 2018-12-15 RX ADMIN — OXYCODONE AND ACETAMINOPHEN 1 TABLET: 5; 325 TABLET ORAL at 05:00

## 2018-12-15 RX ADMIN — PIPERACILLIN SODIUM,TAZOBACTAM SODIUM 3.38 G: 3; .375 INJECTION, POWDER, FOR SOLUTION INTRAVENOUS at 23:20

## 2018-12-15 RX ADMIN — FUROSEMIDE 80 MG: 10 INJECTION, SOLUTION INTRAMUSCULAR; INTRAVENOUS at 11:22

## 2018-12-15 RX ADMIN — RALTEGRAVIR 400 MG: 400 TABLET, FILM COATED ORAL at 09:20

## 2018-12-15 RX ADMIN — INSULIN LISPRO 3 UNITS: 100 INJECTION, SOLUTION INTRAVENOUS; SUBCUTANEOUS at 12:37

## 2018-12-15 RX ADMIN — ATORVASTATIN CALCIUM 80 MG: 80 TABLET, FILM COATED ORAL at 09:16

## 2018-12-15 RX ADMIN — INSULIN LISPRO 2 UNITS: 100 INJECTION, SOLUTION INTRAVENOUS; SUBCUTANEOUS at 12:36

## 2018-12-15 RX ADMIN — ABACAVIR 600 MG: 300 TABLET, FILM COATED ORAL at 09:17

## 2018-12-15 RX ADMIN — SODIUM CHLORIDE: 9 INJECTION, SOLUTION INTRAVENOUS at 17:41

## 2018-12-15 RX ADMIN — INSULIN GLARGINE 15 UNITS: 100 INJECTION, SOLUTION SUBCUTANEOUS at 00:21

## 2018-12-15 RX ADMIN — HEPARIN SODIUM 2000 UNITS: 1000 INJECTION INTRAVENOUS; SUBCUTANEOUS at 06:45

## 2018-12-15 RX ADMIN — ASPIRIN 81 MG 81 MG: 81 TABLET ORAL at 09:16

## 2018-12-15 RX ADMIN — MONTELUKAST SODIUM 10 MG: 10 TABLET, COATED ORAL at 21:32

## 2018-12-15 RX ADMIN — PIPERACILLIN SODIUM,TAZOBACTAM SODIUM 3.38 G: 3; .375 INJECTION, POWDER, FOR SOLUTION INTRAVENOUS at 15:11

## 2018-12-15 RX ADMIN — METOPROLOL SUCCINATE 50 MG: 50 TABLET, EXTENDED RELEASE ORAL at 21:32

## 2018-12-15 RX ADMIN — OXYCODONE AND ACETAMINOPHEN 1 TABLET: 5; 325 TABLET ORAL at 21:34

## 2018-12-15 RX ADMIN — SACUBITRIL AND VALSARTAN 1 TABLET: 24; 26 TABLET, FILM COATED ORAL at 09:19

## 2018-12-15 RX ADMIN — CALCIUM 500 MG: 500 TABLET ORAL at 09:17

## 2018-12-15 RX ADMIN — INSULIN LISPRO 4 UNITS: 100 INJECTION, SOLUTION INTRAVENOUS; SUBCUTANEOUS at 22:59

## 2018-12-15 RX ADMIN — METOPROLOL SUCCINATE 50 MG: 50 TABLET, EXTENDED RELEASE ORAL at 09:17

## 2018-12-15 RX ADMIN — RALTEGRAVIR 400 MG: 400 TABLET, FILM COATED ORAL at 21:39

## 2018-12-15 RX ADMIN — PANTOPRAZOLE SODIUM 40 MG: 40 TABLET, DELAYED RELEASE ORAL at 05:00

## 2018-12-15 RX ADMIN — DIGOXIN 125 MCG: 125 TABLET ORAL at 11:22

## 2018-12-15 RX ADMIN — INSULIN LISPRO 2 UNITS: 100 INJECTION, SOLUTION INTRAVENOUS; SUBCUTANEOUS at 22:59

## 2018-12-15 RX ADMIN — FUROSEMIDE 40 MG: 40 TABLET ORAL at 09:17

## 2018-12-15 RX ADMIN — OXYCODONE AND ACETAMINOPHEN 1 TABLET: 5; 325 TABLET ORAL at 11:24

## 2018-12-15 RX ADMIN — CALCIUM 500 MG: 500 TABLET ORAL at 21:33

## 2018-12-15 RX ADMIN — MAGNESIUM SULFATE HEPTAHYDRATE 3 G: 500 INJECTION, SOLUTION INTRAMUSCULAR; INTRAVENOUS at 11:00

## 2018-12-15 RX ADMIN — VITAMIN D, TAB 1000IU (100/BT) 1000 UNITS: 25 TAB at 09:17

## 2018-12-15 RX ADMIN — INSULIN LISPRO 2 UNITS: 100 INJECTION, SOLUTION INTRAVENOUS; SUBCUTANEOUS at 17:44

## 2018-12-15 RX ADMIN — INSULIN GLARGINE 15 UNITS: 100 INJECTION, SOLUTION SUBCUTANEOUS at 22:59

## 2018-12-15 ASSESSMENT — PAIN SCALES - GENERAL
PAINLEVEL_OUTOF10: 5
PAINLEVEL_OUTOF10: 8
PAINLEVEL_OUTOF10: 7
PAINLEVEL_OUTOF10: 8
PAINLEVEL_OUTOF10: 0
PAINLEVEL_OUTOF10: 0
PAINLEVEL_OUTOF10: 8

## 2018-12-15 ASSESSMENT — PAIN DESCRIPTION - PAIN TYPE
TYPE: ACUTE PAIN

## 2018-12-15 ASSESSMENT — PAIN DESCRIPTION - LOCATION
LOCATION: GENERALIZED
LOCATION: GENERALIZED
LOCATION: LEG

## 2018-12-15 ASSESSMENT — PAIN DESCRIPTION - ORIENTATION
ORIENTATION: LEFT
ORIENTATION: LEFT

## 2018-12-15 ASSESSMENT — PAIN DESCRIPTION - DESCRIPTORS: DESCRIPTORS: ACHING

## 2018-12-15 ASSESSMENT — PAIN DESCRIPTION - FREQUENCY: FREQUENCY: CONTINUOUS

## 2018-12-16 LAB
ALBUMIN SERPL-MCNC: 2.3 G/DL (ref 3.4–5)
ALP BLD-CCNC: 90 U/L (ref 40–129)
ALT SERPL-CCNC: 45 U/L (ref 10–40)
ANION GAP SERPL CALCULATED.3IONS-SCNC: 9 MMOL/L (ref 3–16)
APTT: 71.2 SEC (ref 26–36)
AST SERPL-CCNC: 22 U/L (ref 15–37)
BASOPHILS ABSOLUTE: 0.1 K/UL (ref 0–0.2)
BASOPHILS RELATIVE PERCENT: 0.8 %
BILIRUB SERPL-MCNC: 0.7 MG/DL (ref 0–1)
BILIRUBIN DIRECT: 0.3 MG/DL (ref 0–0.3)
BILIRUBIN, INDIRECT: 0.4 MG/DL (ref 0–1)
BUN BLDV-MCNC: 8 MG/DL (ref 7–20)
CALCIUM SERPL-MCNC: 8.1 MG/DL (ref 8.3–10.6)
CHLORIDE BLD-SCNC: 102 MMOL/L (ref 99–110)
CO2: 28 MMOL/L (ref 21–32)
CREAT SERPL-MCNC: 1.1 MG/DL (ref 0.9–1.3)
EOSINOPHILS ABSOLUTE: 0.1 K/UL (ref 0–0.6)
EOSINOPHILS RELATIVE PERCENT: 1.2 %
GFR AFRICAN AMERICAN: >60
GFR NON-AFRICAN AMERICAN: >60
GLUCOSE BLD-MCNC: 119 MG/DL (ref 70–99)
GLUCOSE BLD-MCNC: 237 MG/DL (ref 70–99)
GLUCOSE BLD-MCNC: 256 MG/DL (ref 70–99)
GLUCOSE BLD-MCNC: 97 MG/DL (ref 70–99)
HCT VFR BLD CALC: 35.6 % (ref 40.5–52.5)
HEMOGLOBIN: 11.2 G/DL (ref 13.5–17.5)
LYMPHOCYTES ABSOLUTE: 1.5 K/UL (ref 1–5.1)
LYMPHOCYTES RELATIVE PERCENT: 16.7 %
MAGNESIUM: 1.5 MG/DL (ref 1.8–2.4)
MCH RBC QN AUTO: 26.1 PG (ref 26–34)
MCHC RBC AUTO-ENTMCNC: 31.6 G/DL (ref 31–36)
MCV RBC AUTO: 82.7 FL (ref 80–100)
MONOCYTES ABSOLUTE: 1 K/UL (ref 0–1.3)
MONOCYTES RELATIVE PERCENT: 11.3 %
NEUTROPHILS ABSOLUTE: 6.2 K/UL (ref 1.7–7.7)
NEUTROPHILS RELATIVE PERCENT: 70 %
PDW BLD-RTO: 20.8 % (ref 12.4–15.4)
PERFORMED ON: ABNORMAL
PERFORMED ON: ABNORMAL
PERFORMED ON: NORMAL
PHOSPHORUS: 2.1 MG/DL (ref 2.5–4.9)
PLATELET # BLD: 202 K/UL (ref 135–450)
PMV BLD AUTO: 7.2 FL (ref 5–10.5)
POTASSIUM REFLEX MAGNESIUM: 3.5 MMOL/L (ref 3.5–5.1)
RBC # BLD: 4.3 M/UL (ref 4.2–5.9)
SODIUM BLD-SCNC: 139 MMOL/L (ref 136–145)
TOTAL PROTEIN: 6.1 G/DL (ref 6.4–8.2)
WBC # BLD: 8.8 K/UL (ref 4–11)

## 2018-12-16 PROCEDURE — 85730 THROMBOPLASTIN TIME PARTIAL: CPT

## 2018-12-16 PROCEDURE — 85025 COMPLETE CBC W/AUTO DIFF WBC: CPT

## 2018-12-16 PROCEDURE — 80048 BASIC METABOLIC PNL TOTAL CA: CPT

## 2018-12-16 PROCEDURE — 6370000000 HC RX 637 (ALT 250 FOR IP): Performed by: SURGERY

## 2018-12-16 PROCEDURE — 6370000000 HC RX 637 (ALT 250 FOR IP): Performed by: NURSE PRACTITIONER

## 2018-12-16 PROCEDURE — 99232 SBSQ HOSP IP/OBS MODERATE 35: CPT | Performed by: INTERNAL MEDICINE

## 2018-12-16 PROCEDURE — 2580000003 HC RX 258: Performed by: INTERNAL MEDICINE

## 2018-12-16 PROCEDURE — 6370000000 HC RX 637 (ALT 250 FOR IP): Performed by: INTERNAL MEDICINE

## 2018-12-16 PROCEDURE — 2580000003 HC RX 258: Performed by: SURGERY

## 2018-12-16 PROCEDURE — 2500000003 HC RX 250 WO HCPCS: Performed by: INTERNAL MEDICINE

## 2018-12-16 PROCEDURE — 6360000002 HC RX W HCPCS: Performed by: INTERNAL MEDICINE

## 2018-12-16 PROCEDURE — 83735 ASSAY OF MAGNESIUM: CPT

## 2018-12-16 PROCEDURE — 2060000000 HC ICU INTERMEDIATE R&B

## 2018-12-16 PROCEDURE — 94760 N-INVAS EAR/PLS OXIMETRY 1: CPT

## 2018-12-16 PROCEDURE — 84100 ASSAY OF PHOSPHORUS: CPT

## 2018-12-16 PROCEDURE — 80076 HEPATIC FUNCTION PANEL: CPT

## 2018-12-16 RX ORDER — MAGNESIUM SULFATE 1 G/100ML
1 INJECTION INTRAVENOUS ONCE
Status: COMPLETED | OUTPATIENT
Start: 2018-12-16 | End: 2018-12-16

## 2018-12-16 RX ORDER — MAGNESIUM SULFATE IN WATER 40 MG/ML
2 INJECTION, SOLUTION INTRAVENOUS ONCE
Status: COMPLETED | OUTPATIENT
Start: 2018-12-16 | End: 2018-12-16

## 2018-12-16 RX ADMIN — OXYCODONE AND ACETAMINOPHEN 1 TABLET: 5; 325 TABLET ORAL at 10:36

## 2018-12-16 RX ADMIN — LAMIVUDINE 300 MG: 150 TABLET, FILM COATED ORAL at 10:49

## 2018-12-16 RX ADMIN — OXYCODONE AND ACETAMINOPHEN 1 TABLET: 5; 325 TABLET ORAL at 14:26

## 2018-12-16 RX ADMIN — ATORVASTATIN CALCIUM 80 MG: 80 TABLET, FILM COATED ORAL at 10:47

## 2018-12-16 RX ADMIN — ASPIRIN 81 MG 81 MG: 81 TABLET ORAL at 10:48

## 2018-12-16 RX ADMIN — RALTEGRAVIR 400 MG: 400 TABLET, FILM COATED ORAL at 21:55

## 2018-12-16 RX ADMIN — MONTELUKAST SODIUM 10 MG: 10 TABLET, COATED ORAL at 21:53

## 2018-12-16 RX ADMIN — OXYCODONE AND ACETAMINOPHEN 1 TABLET: 5; 325 TABLET ORAL at 03:09

## 2018-12-16 RX ADMIN — TRAMADOL HYDROCHLORIDE 50 MG: 50 TABLET, FILM COATED ORAL at 12:25

## 2018-12-16 RX ADMIN — VITAMIN D, TAB 1000IU (100/BT) 1000 UNITS: 25 TAB at 10:47

## 2018-12-16 RX ADMIN — METOPROLOL SUCCINATE 75 MG: 50 TABLET, EXTENDED RELEASE ORAL at 21:52

## 2018-12-16 RX ADMIN — DIGOXIN 125 MCG: 125 TABLET ORAL at 10:48

## 2018-12-16 RX ADMIN — PIPERACILLIN SODIUM,TAZOBACTAM SODIUM 3.38 G: 3; .375 INJECTION, POWDER, FOR SOLUTION INTRAVENOUS at 14:26

## 2018-12-16 RX ADMIN — OXYCODONE AND ACETAMINOPHEN 1 TABLET: 5; 325 TABLET ORAL at 23:22

## 2018-12-16 RX ADMIN — INSULIN LISPRO 3 UNITS: 100 INJECTION, SOLUTION INTRAVENOUS; SUBCUTANEOUS at 17:39

## 2018-12-16 RX ADMIN — SPIRONOLACTONE 25 MG: 25 TABLET ORAL at 10:47

## 2018-12-16 RX ADMIN — MAGNESIUM SULFATE HEPTAHYDRATE 1 G: 1 INJECTION, SOLUTION INTRAVENOUS at 14:26

## 2018-12-16 RX ADMIN — INSULIN LISPRO 2 UNITS: 100 INJECTION, SOLUTION INTRAVENOUS; SUBCUTANEOUS at 12:26

## 2018-12-16 RX ADMIN — HEPARIN SODIUM 12.7 ML/HR: 10000 INJECTION, SOLUTION INTRAVENOUS at 14:33

## 2018-12-16 RX ADMIN — Medication 10 ML: at 21:58

## 2018-12-16 RX ADMIN — SACUBITRIL AND VALSARTAN 1 TABLET: 24; 26 TABLET, FILM COATED ORAL at 10:50

## 2018-12-16 RX ADMIN — RALTEGRAVIR 400 MG: 400 TABLET, FILM COATED ORAL at 10:49

## 2018-12-16 RX ADMIN — POTASSIUM CHLORIDE 20 MEQ: 20 TABLET, EXTENDED RELEASE ORAL at 17:37

## 2018-12-16 RX ADMIN — CALCIUM 500 MG: 500 TABLET ORAL at 10:47

## 2018-12-16 RX ADMIN — INSULIN LISPRO 3 UNITS: 100 INJECTION, SOLUTION INTRAVENOUS; SUBCUTANEOUS at 17:38

## 2018-12-16 RX ADMIN — SACUBITRIL AND VALSARTAN 1 TABLET: 24; 26 TABLET, FILM COATED ORAL at 21:55

## 2018-12-16 RX ADMIN — OXYCODONE AND ACETAMINOPHEN 1 TABLET: 5; 325 TABLET ORAL at 18:27

## 2018-12-16 RX ADMIN — ABACAVIR 600 MG: 300 TABLET, FILM COATED ORAL at 10:48

## 2018-12-16 RX ADMIN — SODIUM CHLORIDE: 9 INJECTION, SOLUTION INTRAVENOUS at 05:55

## 2018-12-16 RX ADMIN — PANTOPRAZOLE SODIUM 40 MG: 40 TABLET, DELAYED RELEASE ORAL at 06:41

## 2018-12-16 RX ADMIN — CALCIUM 500 MG: 500 TABLET ORAL at 21:52

## 2018-12-16 RX ADMIN — PIPERACILLIN SODIUM,TAZOBACTAM SODIUM 3.38 G: 3; .375 INJECTION, POWDER, FOR SOLUTION INTRAVENOUS at 06:11

## 2018-12-16 RX ADMIN — FUROSEMIDE 80 MG: 10 INJECTION, SOLUTION INTRAMUSCULAR; INTRAVENOUS at 10:46

## 2018-12-16 RX ADMIN — POTASSIUM PHOSPHATE, MONOBASIC AND POTASSIUM PHOSPHATE, DIBASIC 15 MMOL: 224; 236 INJECTION, SOLUTION INTRAVENOUS at 10:45

## 2018-12-16 RX ADMIN — Medication 10 ML: at 10:51

## 2018-12-16 RX ADMIN — MAGNESIUM SULFATE HEPTAHYDRATE 2 G: 40 INJECTION, SOLUTION INTRAVENOUS at 10:45

## 2018-12-16 ASSESSMENT — PAIN SCALES - GENERAL
PAINLEVEL_OUTOF10: 7
PAINLEVEL_OUTOF10: 8
PAINLEVEL_OUTOF10: 6
PAINLEVEL_OUTOF10: 8
PAINLEVEL_OUTOF10: 0
PAINLEVEL_OUTOF10: 8
PAINLEVEL_OUTOF10: 7
PAINLEVEL_OUTOF10: 8
PAINLEVEL_OUTOF10: 7

## 2018-12-16 ASSESSMENT — PAIN DESCRIPTION - DESCRIPTORS: DESCRIPTORS: ACHING

## 2018-12-16 ASSESSMENT — PAIN DESCRIPTION - PAIN TYPE: TYPE: ACUTE PAIN

## 2018-12-16 ASSESSMENT — PAIN DESCRIPTION - LOCATION: LOCATION: GENERALIZED

## 2018-12-16 NOTE — PROGRESS NOTES
Intravenous BID    sacubitril-valsartan  1 tablet Oral BID    insulin glargine  15 Units Subcutaneous Nightly    insulin lispro  0-6 Units Subcutaneous TID WC    insulin lispro  0-3 Units Subcutaneous Nightly    insulin lispro  3 Units Subcutaneous TID WC    spironolactone  25 mg Oral Daily    calcium elemental  500 mg Oral BID    vitamin D  1,000 Units Oral Daily    piperacillin-tazobactam  3.375 g Intravenous Q8H    metoprolol succinate  50 mg Oral BID    atorvastatin  80 mg Oral Daily    aspirin  81 mg Oral Daily    montelukast  10 mg Oral Nightly    pantoprazole  40 mg Oral QAM AC    raltegravir  400 mg Oral BID    sodium chloride flush  10 mL Intravenous 2 times per day    abacavir  600 mg Oral Daily    lamiVUDine  300 mg Oral Daily      dextrose      sodium chloride 75 mL/hr at 12/16/18 0555    heparin (porcine) 12.7 mL/hr (12/15/18 1742)    dextrose       glucose, dextrose, glucagon (rDNA), dextrose, acetaminophen, sodium chloride, ondansetron, morphine, oxyCODONE-acetaminophen, traMADol, diphenhydrAMINE, sodium chloride flush, glucose, dextrose, glucagon (rDNA), dextrose    Lab Data:  CBC:   Recent Labs      12/14/18   0612  12/15/18   0330  12/16/18   0524   WBC  8.3  10.4  8.8   HGB  11.9*  11.2*  11.2*   PLT  212  227  202     BMP:    Recent Labs      12/14/18   0612  12/15/18   0330  12/16/18   0523   NA  136  137  139   K  3.6  4.4  3.5   CO2  24  25  28   BUN  16  8  8   CREATININE  1.2  1.2  1.1     LIVR:   Recent Labs      12/14/18   0612  12/15/18   0330  12/16/18   0523   AST  24  24  22   ALT  69*  58*  45*     INR:  No results for input(s): INR in the last 72 hours. APTT:   Recent Labs      12/15/18   1320  12/15/18   1932  12/16/18   0523   APTT  84.3*  80.1*  71.2*     BNP:  No results for input(s): BNP in the last 72 hours.     Imaging:Patient appears to be in atrial fibrillation.   Overall left ventricular systolic function appears severely reduced.   Ejection fraction

## 2018-12-17 ENCOUNTER — ANESTHESIA EVENT (OUTPATIENT)
Dept: OPERATING ROOM | Age: 58
DRG: 253 | End: 2018-12-17
Payer: MEDICARE

## 2018-12-17 ENCOUNTER — ANESTHESIA (OUTPATIENT)
Dept: OPERATING ROOM | Age: 58
DRG: 253 | End: 2018-12-17
Payer: MEDICARE

## 2018-12-17 VITALS
RESPIRATION RATE: 4 BRPM | TEMPERATURE: 99 F | OXYGEN SATURATION: 100 % | DIASTOLIC BLOOD PRESSURE: 57 MMHG | SYSTOLIC BLOOD PRESSURE: 88 MMHG

## 2018-12-17 LAB
ABO/RH: NORMAL
ALBUMIN SERPL-MCNC: 2.3 G/DL (ref 3.4–5)
ALP BLD-CCNC: 92 U/L (ref 40–129)
ALT SERPL-CCNC: 36 U/L (ref 10–40)
ANION GAP SERPL CALCULATED.3IONS-SCNC: 8 MMOL/L (ref 3–16)
ANTIBODY SCREEN: NORMAL
APTT: 64.1 SEC (ref 26–36)
AST SERPL-CCNC: 18 U/L (ref 15–37)
BASOPHILS ABSOLUTE: 0.1 K/UL (ref 0–0.2)
BASOPHILS RELATIVE PERCENT: 1 %
BILIRUB SERPL-MCNC: 0.6 MG/DL (ref 0–1)
BILIRUBIN DIRECT: 0.3 MG/DL (ref 0–0.3)
BILIRUBIN, INDIRECT: 0.3 MG/DL (ref 0–1)
BUN BLDV-MCNC: 8 MG/DL (ref 7–20)
CALCIUM SERPL-MCNC: 8.2 MG/DL (ref 8.3–10.6)
CHLORIDE BLD-SCNC: 102 MMOL/L (ref 99–110)
CO2: 28 MMOL/L (ref 21–32)
CREAT SERPL-MCNC: 1.1 MG/DL (ref 0.9–1.3)
DIGOXIN LEVEL: <0.3 NG/ML (ref 0.8–2)
EOSINOPHILS ABSOLUTE: 0.2 K/UL (ref 0–0.6)
EOSINOPHILS RELATIVE PERCENT: 2.3 %
GFR AFRICAN AMERICAN: >60
GFR NON-AFRICAN AMERICAN: >60
GLUCOSE BLD-MCNC: 115 MG/DL (ref 70–99)
GLUCOSE BLD-MCNC: 118 MG/DL (ref 70–99)
GLUCOSE BLD-MCNC: 162 MG/DL (ref 70–99)
GLUCOSE BLD-MCNC: 182 MG/DL (ref 70–99)
GLUCOSE BLD-MCNC: 405 MG/DL (ref 70–99)
GLUCOSE BLD-MCNC: 87 MG/DL (ref 70–99)
GLUCOSE BLD-MCNC: 92 MG/DL (ref 70–99)
HCT VFR BLD CALC: 33.8 % (ref 40.5–52.5)
HEMOGLOBIN: 10.7 G/DL (ref 13.5–17.5)
LYMPHOCYTES ABSOLUTE: 1.3 K/UL (ref 1–5.1)
LYMPHOCYTES RELATIVE PERCENT: 15.7 %
MAGNESIUM: 1.7 MG/DL (ref 1.8–2.4)
MCH RBC QN AUTO: 26.3 PG (ref 26–34)
MCHC RBC AUTO-ENTMCNC: 31.5 G/DL (ref 31–36)
MCV RBC AUTO: 83.7 FL (ref 80–100)
MONOCYTES ABSOLUTE: 0.8 K/UL (ref 0–1.3)
MONOCYTES RELATIVE PERCENT: 9.7 %
NEUTROPHILS ABSOLUTE: 6.1 K/UL (ref 1.7–7.7)
NEUTROPHILS RELATIVE PERCENT: 71.3 %
PDW BLD-RTO: 20.6 % (ref 12.4–15.4)
PERFORMED ON: ABNORMAL
PERFORMED ON: NORMAL
PERFORMED ON: NORMAL
PHOSPHORUS: 2.5 MG/DL (ref 2.5–4.9)
PLATELET # BLD: 202 K/UL (ref 135–450)
PMV BLD AUTO: 7.1 FL (ref 5–10.5)
POTASSIUM REFLEX MAGNESIUM: 3.9 MMOL/L (ref 3.5–5.1)
RBC # BLD: 4.05 M/UL (ref 4.2–5.9)
SODIUM BLD-SCNC: 138 MMOL/L (ref 136–145)
TOTAL PROTEIN: 6 G/DL (ref 6.4–8.2)
WBC # BLD: 8.5 K/UL (ref 4–11)

## 2018-12-17 PROCEDURE — 80076 HEPATIC FUNCTION PANEL: CPT

## 2018-12-17 PROCEDURE — 2780000010 HC IMPLANT OTHER: Performed by: SURGERY

## 2018-12-17 PROCEDURE — 6370000000 HC RX 637 (ALT 250 FOR IP): Performed by: INTERNAL MEDICINE

## 2018-12-17 PROCEDURE — 6370000000 HC RX 637 (ALT 250 FOR IP): Performed by: SURGERY

## 2018-12-17 PROCEDURE — 3700000000 HC ANESTHESIA ATTENDED CARE: Performed by: SURGERY

## 2018-12-17 PROCEDURE — 2100000000 HC CCU R&B

## 2018-12-17 PROCEDURE — 3700000001 HC ADD 15 MINUTES (ANESTHESIA): Performed by: SURGERY

## 2018-12-17 PROCEDURE — 2500000003 HC RX 250 WO HCPCS: Performed by: ANESTHESIOLOGY

## 2018-12-17 PROCEDURE — S0028 INJECTION, FAMOTIDINE, 20 MG: HCPCS | Performed by: ANESTHESIOLOGY

## 2018-12-17 PROCEDURE — 80162 ASSAY OF DIGOXIN TOTAL: CPT

## 2018-12-17 PROCEDURE — 2580000003 HC RX 258: Performed by: ANESTHESIOLOGY

## 2018-12-17 PROCEDURE — 80048 BASIC METABOLIC PNL TOTAL CA: CPT

## 2018-12-17 PROCEDURE — 6360000002 HC RX W HCPCS: Performed by: INTERNAL MEDICINE

## 2018-12-17 PROCEDURE — 2580000003 HC RX 258: Performed by: SURGERY

## 2018-12-17 PROCEDURE — 36415 COLL VENOUS BLD VENIPUNCTURE: CPT

## 2018-12-17 PROCEDURE — APPNB30 APP NON BILLABLE TIME 0-30 MINS: Performed by: NURSE PRACTITIONER

## 2018-12-17 PROCEDURE — 94760 N-INVAS EAR/PLS OXIMETRY 1: CPT

## 2018-12-17 PROCEDURE — 86901 BLOOD TYPING SEROLOGIC RH(D): CPT

## 2018-12-17 PROCEDURE — 6360000002 HC RX W HCPCS

## 2018-12-17 PROCEDURE — 2709999900 HC NON-CHARGEABLE SUPPLY: Performed by: SURGERY

## 2018-12-17 PROCEDURE — C1894 INTRO/SHEATH, NON-LASER: HCPCS | Performed by: SURGERY

## 2018-12-17 PROCEDURE — 2500000003 HC RX 250 WO HCPCS: Performed by: NURSE ANESTHETIST, CERTIFIED REGISTERED

## 2018-12-17 PROCEDURE — 2580000003 HC RX 258: Performed by: INTERNAL MEDICINE

## 2018-12-17 PROCEDURE — 3600000004 HC SURGERY LEVEL 4 BASE: Performed by: SURGERY

## 2018-12-17 PROCEDURE — 6360000002 HC RX W HCPCS: Performed by: SURGERY

## 2018-12-17 PROCEDURE — 99222 1ST HOSP IP/OBS MODERATE 55: CPT | Performed by: INTERNAL MEDICINE

## 2018-12-17 PROCEDURE — 35556 ART BYP GRFT FEM-POPLITEAL: CPT | Performed by: SURGERY

## 2018-12-17 PROCEDURE — 7100000000 HC PACU RECOVERY - FIRST 15 MIN

## 2018-12-17 PROCEDURE — 85730 THROMBOPLASTIN TIME PARTIAL: CPT

## 2018-12-17 PROCEDURE — 2580000003 HC RX 258: Performed by: NURSE ANESTHETIST, CERTIFIED REGISTERED

## 2018-12-17 PROCEDURE — P9045 ALBUMIN (HUMAN), 5%, 250 ML: HCPCS

## 2018-12-17 PROCEDURE — 85025 COMPLETE CBC W/AUTO DIFF WBC: CPT

## 2018-12-17 PROCEDURE — 7100000001 HC PACU RECOVERY - ADDTL 15 MIN

## 2018-12-17 PROCEDURE — 6370000000 HC RX 637 (ALT 250 FOR IP): Performed by: NURSE PRACTITIONER

## 2018-12-17 PROCEDURE — APPSS30 APP SPLIT SHARED TIME 16-30 MINUTES: Performed by: NURSE PRACTITIONER

## 2018-12-17 PROCEDURE — 2500000003 HC RX 250 WO HCPCS

## 2018-12-17 PROCEDURE — 2580000003 HC RX 258

## 2018-12-17 PROCEDURE — 041N0JS BYPASS LEFT POPLITEAL ARTERY TO LOWER EXTREMITY VEIN WITH SYNTHETIC SUBSTITUTE, OPEN APPROACH: ICD-10-PCS | Performed by: SURGERY

## 2018-12-17 PROCEDURE — 86900 BLOOD TYPING SEROLOGIC ABO: CPT

## 2018-12-17 PROCEDURE — 84100 ASSAY OF PHOSPHORUS: CPT

## 2018-12-17 PROCEDURE — 86850 RBC ANTIBODY SCREEN: CPT

## 2018-12-17 PROCEDURE — 83735 ASSAY OF MAGNESIUM: CPT

## 2018-12-17 PROCEDURE — 6360000002 HC RX W HCPCS: Performed by: NURSE ANESTHETIST, CERTIFIED REGISTERED

## 2018-12-17 PROCEDURE — 3600000014 HC SURGERY LEVEL 4 ADDTL 15MIN: Performed by: SURGERY

## 2018-12-17 RX ORDER — SODIUM CHLORIDE 0.9 % (FLUSH) 0.9 %
10 SYRINGE (ML) INJECTION PRN
Status: DISCONTINUED | OUTPATIENT
Start: 2018-12-17 | End: 2018-12-17 | Stop reason: HOSPADM

## 2018-12-17 RX ORDER — FENTANYL CITRATE 50 UG/ML
25 INJECTION, SOLUTION INTRAMUSCULAR; INTRAVENOUS EVERY 5 MIN PRN
Status: DISCONTINUED | OUTPATIENT
Start: 2018-12-17 | End: 2018-12-17 | Stop reason: HOSPADM

## 2018-12-17 RX ORDER — SODIUM CHLORIDE 9 MG/ML
INJECTION, SOLUTION INTRAVENOUS CONTINUOUS
Status: DISCONTINUED | OUTPATIENT
Start: 2018-12-17 | End: 2018-12-17

## 2018-12-17 RX ORDER — MORPHINE SULFATE 2 MG/ML
1 INJECTION, SOLUTION INTRAMUSCULAR; INTRAVENOUS EVERY 5 MIN PRN
Status: DISCONTINUED | OUTPATIENT
Start: 2018-12-17 | End: 2018-12-17 | Stop reason: HOSPADM

## 2018-12-17 RX ORDER — OXYCODONE HYDROCHLORIDE AND ACETAMINOPHEN 5; 325 MG/1; MG/1
2 TABLET ORAL PRN
Status: DISCONTINUED | OUTPATIENT
Start: 2018-12-17 | End: 2018-12-17 | Stop reason: HOSPADM

## 2018-12-17 RX ORDER — FENTANYL CITRATE 50 UG/ML
INJECTION, SOLUTION INTRAMUSCULAR; INTRAVENOUS PRN
Status: DISCONTINUED | OUTPATIENT
Start: 2018-12-17 | End: 2018-12-17 | Stop reason: SDUPTHER

## 2018-12-17 RX ORDER — ROCURONIUM BROMIDE 10 MG/ML
INJECTION, SOLUTION INTRAVENOUS PRN
Status: DISCONTINUED | OUTPATIENT
Start: 2018-12-17 | End: 2018-12-17 | Stop reason: SDUPTHER

## 2018-12-17 RX ORDER — MIDAZOLAM HYDROCHLORIDE 1 MG/ML
INJECTION INTRAMUSCULAR; INTRAVENOUS PRN
Status: DISCONTINUED | OUTPATIENT
Start: 2018-12-17 | End: 2018-12-17 | Stop reason: SDUPTHER

## 2018-12-17 RX ORDER — HEPARIN SODIUM 10000 [USP'U]/ML
INJECTION, SOLUTION INTRAVENOUS; SUBCUTANEOUS PRN
Status: DISCONTINUED | OUTPATIENT
Start: 2018-12-17 | End: 2018-12-17 | Stop reason: SDUPTHER

## 2018-12-17 RX ORDER — MEPERIDINE HYDROCHLORIDE 25 MG/ML
12.5 INJECTION INTRAMUSCULAR; INTRAVENOUS; SUBCUTANEOUS EVERY 5 MIN PRN
Status: DISCONTINUED | OUTPATIENT
Start: 2018-12-17 | End: 2018-12-17 | Stop reason: HOSPADM

## 2018-12-17 RX ORDER — METOPROLOL TARTRATE 5 MG/5ML
INJECTION INTRAVENOUS PRN
Status: DISCONTINUED | OUTPATIENT
Start: 2018-12-17 | End: 2018-12-17 | Stop reason: SDUPTHER

## 2018-12-17 RX ORDER — OXYCODONE HYDROCHLORIDE AND ACETAMINOPHEN 5; 325 MG/1; MG/1
1 TABLET ORAL PRN
Status: DISCONTINUED | OUTPATIENT
Start: 2018-12-17 | End: 2018-12-17 | Stop reason: HOSPADM

## 2018-12-17 RX ORDER — ONDANSETRON 2 MG/ML
INJECTION INTRAMUSCULAR; INTRAVENOUS PRN
Status: DISCONTINUED | OUTPATIENT
Start: 2018-12-17 | End: 2018-12-17 | Stop reason: SDUPTHER

## 2018-12-17 RX ORDER — ETOMIDATE 2 MG/ML
INJECTION INTRAVENOUS PRN
Status: DISCONTINUED | OUTPATIENT
Start: 2018-12-17 | End: 2018-12-17 | Stop reason: SDUPTHER

## 2018-12-17 RX ORDER — ALBUMIN, HUMAN INJ 5% 5 %
SOLUTION INTRAVENOUS PRN
Status: DISCONTINUED | OUTPATIENT
Start: 2018-12-17 | End: 2018-12-17 | Stop reason: SDUPTHER

## 2018-12-17 RX ORDER — SODIUM CHLORIDE 0.9 % (FLUSH) 0.9 %
10 SYRINGE (ML) INJECTION EVERY 12 HOURS SCHEDULED
Status: DISCONTINUED | OUTPATIENT
Start: 2018-12-17 | End: 2018-12-17 | Stop reason: HOSPADM

## 2018-12-17 RX ORDER — EPHEDRINE SULFATE/0.9% NACL/PF 50 MG/5 ML
SYRINGE (ML) INTRAVENOUS PRN
Status: DISCONTINUED | OUTPATIENT
Start: 2018-12-17 | End: 2018-12-17 | Stop reason: SDUPTHER

## 2018-12-17 RX ORDER — SODIUM CHLORIDE, SODIUM LACTATE, POTASSIUM CHLORIDE, CALCIUM CHLORIDE 600; 310; 30; 20 MG/100ML; MG/100ML; MG/100ML; MG/100ML
INJECTION, SOLUTION INTRAVENOUS CONTINUOUS PRN
Status: DISCONTINUED | OUTPATIENT
Start: 2018-12-17 | End: 2018-12-17 | Stop reason: SDUPTHER

## 2018-12-17 RX ORDER — DEXAMETHASONE SODIUM PHOSPHATE 4 MG/ML
INJECTION, SOLUTION INTRA-ARTICULAR; INTRALESIONAL; INTRAMUSCULAR; INTRAVENOUS; SOFT TISSUE PRN
Status: DISCONTINUED | OUTPATIENT
Start: 2018-12-17 | End: 2018-12-17 | Stop reason: SDUPTHER

## 2018-12-17 RX ORDER — FENTANYL CITRATE 50 UG/ML
50 INJECTION, SOLUTION INTRAMUSCULAR; INTRAVENOUS EVERY 5 MIN PRN
Status: DISCONTINUED | OUTPATIENT
Start: 2018-12-17 | End: 2018-12-17 | Stop reason: HOSPADM

## 2018-12-17 RX ORDER — MORPHINE SULFATE 2 MG/ML
2 INJECTION, SOLUTION INTRAMUSCULAR; INTRAVENOUS EVERY 5 MIN PRN
Status: DISCONTINUED | OUTPATIENT
Start: 2018-12-17 | End: 2018-12-17 | Stop reason: HOSPADM

## 2018-12-17 RX ORDER — PROPOFOL 10 MG/ML
INJECTION, EMULSION INTRAVENOUS PRN
Status: DISCONTINUED | OUTPATIENT
Start: 2018-12-17 | End: 2018-12-17 | Stop reason: SDUPTHER

## 2018-12-17 RX ORDER — VECURONIUM BROMIDE 1 MG/ML
INJECTION, POWDER, LYOPHILIZED, FOR SOLUTION INTRAVENOUS PRN
Status: DISCONTINUED | OUTPATIENT
Start: 2018-12-17 | End: 2018-12-17 | Stop reason: SDUPTHER

## 2018-12-17 RX ORDER — LIDOCAINE HYDROCHLORIDE 20 MG/ML
INJECTION, SOLUTION INFILTRATION; PERINEURAL PRN
Status: DISCONTINUED | OUTPATIENT
Start: 2018-12-17 | End: 2018-12-17 | Stop reason: SDUPTHER

## 2018-12-17 RX ORDER — MAGNESIUM HYDROXIDE 1200 MG/15ML
LIQUID ORAL CONTINUOUS PRN
Status: COMPLETED | OUTPATIENT
Start: 2018-12-17 | End: 2018-12-17

## 2018-12-17 RX ORDER — ONDANSETRON 2 MG/ML
4 INJECTION INTRAMUSCULAR; INTRAVENOUS
Status: DISCONTINUED | OUTPATIENT
Start: 2018-12-17 | End: 2018-12-17 | Stop reason: HOSPADM

## 2018-12-17 RX ORDER — PROTAMINE SULFATE 10 MG/ML
INJECTION, SOLUTION INTRAVENOUS PRN
Status: DISCONTINUED | OUTPATIENT
Start: 2018-12-17 | End: 2018-12-17 | Stop reason: SDUPTHER

## 2018-12-17 RX ADMIN — PANTOPRAZOLE SODIUM 40 MG: 40 TABLET, DELAYED RELEASE ORAL at 05:47

## 2018-12-17 RX ADMIN — PHENYLEPHRINE HYDROCHLORIDE 200 MCG: 10 INJECTION, SOLUTION INTRAMUSCULAR; INTRAVENOUS; SUBCUTANEOUS at 16:07

## 2018-12-17 RX ADMIN — METOPROLOL TARTRATE 1 MG: 5 INJECTION, SOLUTION INTRAVENOUS at 17:16

## 2018-12-17 RX ADMIN — POTASSIUM CHLORIDE 20 MEQ: 20 TABLET, EXTENDED RELEASE ORAL at 21:03

## 2018-12-17 RX ADMIN — ALBUMIN (HUMAN) 250 ML: 12.5 INJECTION, SOLUTION INTRAVENOUS at 16:05

## 2018-12-17 RX ADMIN — SODIUM CHLORIDE: 9 INJECTION, SOLUTION INTRAVENOUS at 13:54

## 2018-12-17 RX ADMIN — ABACAVIR 600 MG: 300 TABLET, FILM COATED ORAL at 09:08

## 2018-12-17 RX ADMIN — ONDANSETRON 4 MG: 2 INJECTION INTRAMUSCULAR; INTRAVENOUS at 15:50

## 2018-12-17 RX ADMIN — METOPROLOL TARTRATE 1 MG: 5 INJECTION, SOLUTION INTRAVENOUS at 17:10

## 2018-12-17 RX ADMIN — SODIUM CHLORIDE: 9 INJECTION, SOLUTION INTRAVENOUS at 20:04

## 2018-12-17 RX ADMIN — SACUBITRIL AND VALSARTAN 1 TABLET: 24; 26 TABLET, FILM COATED ORAL at 09:08

## 2018-12-17 RX ADMIN — PIPERACILLIN SODIUM,TAZOBACTAM SODIUM 3.38 G: 3; .375 INJECTION, POWDER, FOR SOLUTION INTRAVENOUS at 05:47

## 2018-12-17 RX ADMIN — PHENYLEPHRINE HYDROCHLORIDE 300 MCG: 10 INJECTION, SOLUTION INTRAMUSCULAR; INTRAVENOUS; SUBCUTANEOUS at 15:44

## 2018-12-17 RX ADMIN — DEXAMETHASONE SODIUM PHOSPHATE 8 MG: 4 INJECTION, SOLUTION INTRAMUSCULAR; INTRAVENOUS at 15:50

## 2018-12-17 RX ADMIN — FENTANYL CITRATE 25 MCG: 50 INJECTION INTRAMUSCULAR; INTRAVENOUS at 17:04

## 2018-12-17 RX ADMIN — PROTAMINE SULFATE 40 MG: 10 INJECTION, SOLUTION INTRAVENOUS at 18:15

## 2018-12-17 RX ADMIN — ROCURONIUM BROMIDE 20 MG: 10 INJECTION INTRAVENOUS at 17:08

## 2018-12-17 RX ADMIN — RALTEGRAVIR 400 MG: 400 TABLET, FILM COATED ORAL at 09:07

## 2018-12-17 RX ADMIN — CALCIUM 500 MG: 500 TABLET ORAL at 08:58

## 2018-12-17 RX ADMIN — PHENYLEPHRINE HYDROCHLORIDE 100 MCG: 10 INJECTION, SOLUTION INTRAMUSCULAR; INTRAVENOUS; SUBCUTANEOUS at 17:34

## 2018-12-17 RX ADMIN — PHENYLEPHRINE HYDROCHLORIDE 100 MCG/MIN: 10 INJECTION INTRAVENOUS at 18:00

## 2018-12-17 RX ADMIN — RALTEGRAVIR 400 MG: 400 TABLET, FILM COATED ORAL at 21:06

## 2018-12-17 RX ADMIN — ATORVASTATIN CALCIUM 80 MG: 80 TABLET, FILM COATED ORAL at 08:59

## 2018-12-17 RX ADMIN — METOPROLOL TARTRATE 1 MG: 5 INJECTION, SOLUTION INTRAVENOUS at 17:34

## 2018-12-17 RX ADMIN — PHENYLEPHRINE HYDROCHLORIDE 100 MCG: 10 INJECTION, SOLUTION INTRAMUSCULAR; INTRAVENOUS; SUBCUTANEOUS at 17:02

## 2018-12-17 RX ADMIN — SPIRONOLACTONE 25 MG: 25 TABLET ORAL at 08:58

## 2018-12-17 RX ADMIN — OXYCODONE AND ACETAMINOPHEN 1 TABLET: 5; 325 TABLET ORAL at 05:47

## 2018-12-17 RX ADMIN — SODIUM CHLORIDE 500 ML: 9 INJECTION, SOLUTION INTRAVENOUS at 21:16

## 2018-12-17 RX ADMIN — VECURONIUM BROMIDE 5 MG: 1 INJECTION, POWDER, LYOPHILIZED, FOR SOLUTION INTRAVENOUS at 15:27

## 2018-12-17 RX ADMIN — PHENYLEPHRINE HYDROCHLORIDE 100 MCG: 10 INJECTION, SOLUTION INTRAMUSCULAR; INTRAVENOUS; SUBCUTANEOUS at 17:19

## 2018-12-17 RX ADMIN — FUROSEMIDE 80 MG: 10 INJECTION, SOLUTION INTRAMUSCULAR; INTRAVENOUS at 09:00

## 2018-12-17 RX ADMIN — Medication 10 ML: at 21:11

## 2018-12-17 RX ADMIN — FENTANYL CITRATE 25 MCG: 50 INJECTION INTRAMUSCULAR; INTRAVENOUS at 17:09

## 2018-12-17 RX ADMIN — PIPERACILLIN SODIUM,TAZOBACTAM SODIUM 3.38 G: 3; .375 INJECTION, POWDER, FOR SOLUTION INTRAVENOUS at 21:52

## 2018-12-17 RX ADMIN — PIPERACILLIN SODIUM AND TAZOBACTAM SODIUM 3.38 G: 3; .375 INJECTION, POWDER, FOR SOLUTION INTRAVENOUS at 15:20

## 2018-12-17 RX ADMIN — METOPROLOL TARTRATE 1 MG: 5 INJECTION, SOLUTION INTRAVENOUS at 17:43

## 2018-12-17 RX ADMIN — PHENYLEPHRINE HYDROCHLORIDE 100 MCG: 10 INJECTION, SOLUTION INTRAMUSCULAR; INTRAVENOUS; SUBCUTANEOUS at 17:36

## 2018-12-17 RX ADMIN — PHENYLEPHRINE HYDROCHLORIDE 100 MCG: 10 INJECTION, SOLUTION INTRAMUSCULAR; INTRAVENOUS; SUBCUTANEOUS at 19:08

## 2018-12-17 RX ADMIN — INSULIN LISPRO 3 UNITS: 100 INJECTION, SOLUTION INTRAVENOUS; SUBCUTANEOUS at 00:14

## 2018-12-17 RX ADMIN — METOPROLOL TARTRATE 1 MG: 5 INJECTION, SOLUTION INTRAVENOUS at 17:20

## 2018-12-17 RX ADMIN — FENTANYL CITRATE 50 MCG: 50 INJECTION INTRAMUSCULAR; INTRAVENOUS at 15:27

## 2018-12-17 RX ADMIN — ETOMIDATE 20 MG: 2 INJECTION INTRAVENOUS at 15:27

## 2018-12-17 RX ADMIN — HEPARIN SODIUM 7000 UNITS: 10000 INJECTION, SOLUTION INTRAVENOUS; SUBCUTANEOUS at 16:50

## 2018-12-17 RX ADMIN — INSULIN GLARGINE 15 UNITS: 100 INJECTION, SOLUTION SUBCUTANEOUS at 00:15

## 2018-12-17 RX ADMIN — Medication 10 ML: at 09:00

## 2018-12-17 RX ADMIN — Medication 20 MG: at 16:21

## 2018-12-17 RX ADMIN — MIDAZOLAM 2 MG: 1 INJECTION INTRAMUSCULAR; INTRAVENOUS at 15:20

## 2018-12-17 RX ADMIN — MONTELUKAST SODIUM 10 MG: 10 TABLET, COATED ORAL at 21:03

## 2018-12-17 RX ADMIN — SODIUM CHLORIDE, SODIUM LACTATE, POTASSIUM CHLORIDE, AND CALCIUM CHLORIDE: .6; .31; .03; .02 INJECTION, SOLUTION INTRAVENOUS at 16:36

## 2018-12-17 RX ADMIN — POTASSIUM CHLORIDE 20 MEQ: 20 TABLET, EXTENDED RELEASE ORAL at 09:07

## 2018-12-17 RX ADMIN — PHENYLEPHRINE HYDROCHLORIDE 200 MCG: 10 INJECTION, SOLUTION INTRAMUSCULAR; INTRAVENOUS; SUBCUTANEOUS at 15:48

## 2018-12-17 RX ADMIN — ROCURONIUM BROMIDE 10 MG: 10 INJECTION INTRAVENOUS at 17:50

## 2018-12-17 RX ADMIN — TRAMADOL HYDROCHLORIDE 50 MG: 50 TABLET, FILM COATED ORAL at 08:59

## 2018-12-17 RX ADMIN — VITAMIN D, TAB 1000IU (100/BT) 1000 UNITS: 25 TAB at 08:59

## 2018-12-17 RX ADMIN — VECURONIUM BROMIDE 5 MG: 1 INJECTION, POWDER, LYOPHILIZED, FOR SOLUTION INTRAVENOUS at 16:03

## 2018-12-17 RX ADMIN — CALCIUM 500 MG: 500 TABLET ORAL at 21:10

## 2018-12-17 RX ADMIN — PHENYLEPHRINE HYDROCHLORIDE 100 MCG: 10 INJECTION, SOLUTION INTRAMUSCULAR; INTRAVENOUS; SUBCUTANEOUS at 17:12

## 2018-12-17 RX ADMIN — PIPERACILLIN SODIUM,TAZOBACTAM SODIUM 3.38 G: 3; .375 INJECTION, POWDER, FOR SOLUTION INTRAVENOUS at 00:32

## 2018-12-17 RX ADMIN — PHENYLEPHRINE HYDROCHLORIDE 300 MCG: 10 INJECTION, SOLUTION INTRAMUSCULAR; INTRAVENOUS; SUBCUTANEOUS at 15:45

## 2018-12-17 RX ADMIN — HEPARIN SODIUM 12.7 ML/HR: 10000 INJECTION, SOLUTION INTRAVENOUS at 12:54

## 2018-12-17 RX ADMIN — SACUBITRIL AND VALSARTAN 1 TABLET: 24; 26 TABLET, FILM COATED ORAL at 21:06

## 2018-12-17 RX ADMIN — PROPOFOL 50 MG: 10 INJECTION, EMULSION INTRAVENOUS at 15:27

## 2018-12-17 RX ADMIN — Medication 30 MG: at 16:25

## 2018-12-17 RX ADMIN — SUGAMMADEX 200 MG: 100 INJECTION, SOLUTION INTRAVENOUS at 18:55

## 2018-12-17 RX ADMIN — LIDOCAINE HYDROCHLORIDE 60 MG: 20 INJECTION, SOLUTION INFILTRATION; PERINEURAL at 15:27

## 2018-12-17 RX ADMIN — FAMOTIDINE 20 MG: 10 INJECTION, SOLUTION INTRAVENOUS at 13:53

## 2018-12-17 RX ADMIN — DIGOXIN 125 MCG: 125 TABLET ORAL at 08:58

## 2018-12-17 RX ADMIN — LAMIVUDINE 300 MG: 150 TABLET, FILM COATED ORAL at 09:08

## 2018-12-17 RX ADMIN — METOPROLOL SUCCINATE 75 MG: 50 TABLET, EXTENDED RELEASE ORAL at 08:57

## 2018-12-17 ASSESSMENT — PULMONARY FUNCTION TESTS
PIF_VALUE: 24
PIF_VALUE: 22
PIF_VALUE: 22
PIF_VALUE: 21
PIF_VALUE: 22
PIF_VALUE: 22
PIF_VALUE: 20
PIF_VALUE: 20
PIF_VALUE: 24
PIF_VALUE: 21
PIF_VALUE: 22
PIF_VALUE: 23
PIF_VALUE: 22
PIF_VALUE: 21
PIF_VALUE: 20
PIF_VALUE: 21
PIF_VALUE: 20
PIF_VALUE: 23
PIF_VALUE: 23
PIF_VALUE: 14
PIF_VALUE: 23
PIF_VALUE: 22
PIF_VALUE: 23
PIF_VALUE: 22
PIF_VALUE: 13
PIF_VALUE: 24
PIF_VALUE: 20
PIF_VALUE: 23
PIF_VALUE: 22
PIF_VALUE: 22
PIF_VALUE: 21
PIF_VALUE: 10
PIF_VALUE: 20
PIF_VALUE: 21
PIF_VALUE: 22
PIF_VALUE: 20
PIF_VALUE: 22
PIF_VALUE: 3
PIF_VALUE: 23
PIF_VALUE: 20
PIF_VALUE: 22
PIF_VALUE: 23
PIF_VALUE: 12
PIF_VALUE: 22
PIF_VALUE: 23
PIF_VALUE: 25
PIF_VALUE: 21
PIF_VALUE: 24
PIF_VALUE: 23
PIF_VALUE: 23
PIF_VALUE: 21
PIF_VALUE: 20
PIF_VALUE: 25
PIF_VALUE: 23
PIF_VALUE: 20
PIF_VALUE: 22
PIF_VALUE: 23
PIF_VALUE: 10
PIF_VALUE: 23
PIF_VALUE: 23
PIF_VALUE: 22
PIF_VALUE: 22
PIF_VALUE: 21
PIF_VALUE: 23
PIF_VALUE: 12
PIF_VALUE: 23
PIF_VALUE: 23
PIF_VALUE: 20
PIF_VALUE: 21
PIF_VALUE: 23
PIF_VALUE: 22
PIF_VALUE: 23
PIF_VALUE: 21
PIF_VALUE: 13
PIF_VALUE: 24
PIF_VALUE: 22
PIF_VALUE: 23
PIF_VALUE: 22
PIF_VALUE: 21
PIF_VALUE: 22
PIF_VALUE: 23
PIF_VALUE: 3
PIF_VALUE: 23
PIF_VALUE: 3
PIF_VALUE: 23
PIF_VALUE: 22
PIF_VALUE: 20
PIF_VALUE: 21
PIF_VALUE: 20
PIF_VALUE: 23
PIF_VALUE: 22
PIF_VALUE: 22
PIF_VALUE: 12
PIF_VALUE: 21
PIF_VALUE: 23
PIF_VALUE: 21
PIF_VALUE: 21
PIF_VALUE: 13
PIF_VALUE: 12
PIF_VALUE: 21
PIF_VALUE: 13
PIF_VALUE: 23
PIF_VALUE: 22
PIF_VALUE: 21
PIF_VALUE: 21
PIF_VALUE: 23
PIF_VALUE: 22
PIF_VALUE: 22
PIF_VALUE: 23
PIF_VALUE: 20
PIF_VALUE: 22
PIF_VALUE: 22
PIF_VALUE: 20
PIF_VALUE: 21
PIF_VALUE: 22
PIF_VALUE: 22
PIF_VALUE: 12
PIF_VALUE: 24
PIF_VALUE: 22
PIF_VALUE: 23
PIF_VALUE: 22
PIF_VALUE: 25
PIF_VALUE: 23
PIF_VALUE: 20
PIF_VALUE: 23
PIF_VALUE: 24
PIF_VALUE: 22
PIF_VALUE: 23
PIF_VALUE: 23
PIF_VALUE: 21
PIF_VALUE: 22
PIF_VALUE: 20
PIF_VALUE: 20
PIF_VALUE: 13
PIF_VALUE: 4
PIF_VALUE: 23
PIF_VALUE: 20
PIF_VALUE: 23
PIF_VALUE: 23
PIF_VALUE: 24
PIF_VALUE: 20
PIF_VALUE: 23
PIF_VALUE: 24
PIF_VALUE: 21
PIF_VALUE: 20
PIF_VALUE: 21
PIF_VALUE: 22
PIF_VALUE: 21
PIF_VALUE: 22
PIF_VALUE: 24
PIF_VALUE: 23
PIF_VALUE: 22
PIF_VALUE: 24
PIF_VALUE: 22
PIF_VALUE: 21
PIF_VALUE: 22
PIF_VALUE: 24
PIF_VALUE: 22
PIF_VALUE: 24
PIF_VALUE: 21
PIF_VALUE: 4
PIF_VALUE: 23
PIF_VALUE: 3
PIF_VALUE: 21
PIF_VALUE: 23
PIF_VALUE: 23
PIF_VALUE: 21
PIF_VALUE: 23
PIF_VALUE: 20
PIF_VALUE: 13
PIF_VALUE: 21
PIF_VALUE: 23
PIF_VALUE: 23
PIF_VALUE: 2
PIF_VALUE: 22
PIF_VALUE: 21
PIF_VALUE: 23
PIF_VALUE: 22
PIF_VALUE: 22
PIF_VALUE: 21
PIF_VALUE: 20
PIF_VALUE: 22
PIF_VALUE: 23
PIF_VALUE: 21
PIF_VALUE: 21
PIF_VALUE: 4
PIF_VALUE: 20
PIF_VALUE: 23
PIF_VALUE: 24
PIF_VALUE: 20
PIF_VALUE: 22
PIF_VALUE: 23
PIF_VALUE: 13
PIF_VALUE: 22
PIF_VALUE: 24
PIF_VALUE: 20
PIF_VALUE: 23
PIF_VALUE: 23
PIF_VALUE: 21
PIF_VALUE: 22

## 2018-12-17 ASSESSMENT — LIFESTYLE VARIABLES: SMOKING_STATUS: 0

## 2018-12-17 ASSESSMENT — PAIN SCALES - GENERAL
PAINLEVEL_OUTOF10: 0
PAINLEVEL_OUTOF10: 7
PAINLEVEL_OUTOF10: 6
PAINLEVEL_OUTOF10: 6
PAINLEVEL_OUTOF10: 0

## 2018-12-17 ASSESSMENT — PAIN DESCRIPTION - PROGRESSION: CLINICAL_PROGRESSION: NOT CHANGED

## 2018-12-17 ASSESSMENT — PAIN DESCRIPTION - LOCATION: LOCATION: LEG

## 2018-12-17 ASSESSMENT — PAIN DESCRIPTION - DESCRIPTORS: DESCRIPTORS: ACHING;CONSTANT;DISCOMFORT

## 2018-12-17 ASSESSMENT — PAIN DESCRIPTION - PAIN TYPE: TYPE: ACUTE PAIN;SURGICAL PAIN

## 2018-12-17 ASSESSMENT — PAIN DESCRIPTION - ONSET: ONSET: ON-GOING

## 2018-12-17 ASSESSMENT — PAIN DESCRIPTION - ORIENTATION: ORIENTATION: LEFT;ANTERIOR;LOWER

## 2018-12-17 ASSESSMENT — PAIN DESCRIPTION - FREQUENCY: FREQUENCY: CONTINUOUS

## 2018-12-17 NOTE — PROGRESS NOTES
[facility]   Cardiology Progress Note      Admit Date:  12/9/2018    CC: \"SOB\"  This is a 51-year-old male who has  presented to [facility] [place] with a nonhealing ulcer of his left  lower extremity which has been foul smelling. During this admission, he  also had blood test done which showed slightly elevated troponin and  significantly elevated BNP requiring this cardiac consultation. He  denies having any chest pain to me but has some off and on shortness of  breath. He has also had trouble with swelling in his legs.     Interval History:  No acute events overnight. POD 67from deep debridement of LL limb infxn. Afib/aflutter with v-rates 100 bpm on telemetry. Still hypervolemic but diuresing well. Pt to undergo Lt fem/pop today. Objective:   /73   Pulse 76   Temp 97.9 °F (36.6 °C)   Resp 20   Ht 6' 2\" (1.88 m)   Wt 178 lb 5.6 oz (80.9 kg)   SpO2 98%   BMI 22.90 kg/m²       Intake/Output Summary (Last 24 hours) at 12/17/18 0855  Last data filed at 12/17/18 6708   Gross per 24 hour   Intake          2116.88 ml   Output             6125 ml   Net         -4008. 12 ml     Wt Readings from Last 3 Encounters:   12/17/18 178 lb 5.6 oz (80.9 kg)     Telemetry:Afib/aflutter with v-rates 100 bpm    Physical Exam:  General:  NAD, Awake, alert and oriented X4  Skin:  Warm and dry  Neck:  Supple, JVP appreciated, no bruit  Chest:  Clear to auscultation, no wheezes/rhonchi. + crackles bilateral lower fields. Cardiovascular:   tachycardia S1S2  Abdomen:  Soft, nontender, +bowel sounds  Extremities:  Changes of chronic venous insufficiency & non healing ulcer Lt calf.     Cardiac Diagnosis:  diabetes, hypertension, hyperlipidemia, coronary artery disease and CHF    Medications:    metoprolol succinate  75 mg Oral BID    potassium chloride  20 mEq Oral BID    digoxin  125 mcg Oral Daily    furosemide  80 mg Intravenous BID    sacubitril-valsartan  1 tablet Oral BID    insulin glargine  15

## 2018-12-17 NOTE — PROGRESS NOTES
[facility] General and Vascular Surgery            Patient Identification  Richard Crenshaw  :  1960  Admit Date:  2018    CC:  Left leg wound and pain      Post-op Day #7  Subjective:      No acute events overnight, no fevers or chills, dressing not changed over the weekend, per nursing pt refused    underwent angio with stent placement bilateral common and external iliac arteries 18    Objective:  Patient Vitals for the past 24 hrs:   BP Temp Temp src Pulse Resp SpO2 Weight   18 0800 113/73 97.9 °F (36.6 °C) - 76 20 98 % -   18 0743 - - - - - 98 % -   18 0603 113/67 98.1 °F (36.7 °C) Oral 90 16 98 % 178 lb 5.6 oz (80.9 kg)   18 2332 120/76 97.6 °F (36.4 °C) Oral 118 14 96 % -   18 2055 103/64 98.1 °F (36.7 °C) Oral 111 18 97 % -   18 1714 99/63 98.2 °F (36.8 °C) Oral 103 18 98 % -   18 1221 111/68 97.9 °F (36.6 °C) - 51 20 95 % -   18 0955 - - - - - 98 % -        Drain/tube Output:       General:  alert, no apparent distress and normal weight  Resp: no increased WOB  Abd: soft, NT  Vascular:  abnormal foot exam DP absent bilateral and PT absent bilateral  Wound: left calf, no  purulent drainage or pus, cellulitis around wound, edema, tibial bone exposed, malororous     CBC:   Recent Labs      12/15/18   0330  18   0524  18   0559   WBC  10.4  8.8  8.5   HGB  11.2*  11.2*  10.7*   HCT  36.0*  35.6*  33.8*   PLT  227  202  202     BMP:    Recent Labs      12/15/18   0330  18   0523  18   0559   NA  137  139  138   K  4.4  3.5  3.9   CL  100  102  102   CO2  25  28  28   BUN  8  8  8   CREATININE  1.2  1.1  1.1   GLUCOSE  248*  119*  182*     Hepatic:   Recent Labs      12/15/18   0330  18   0523  18   0559   AST  24  22  18   ALT  58*  45*  36   BILITOT  0.7  0.7  0.6   ALKPHOS  103  90  92     Mag:      Recent Labs      12/15/18   0330  18   0523  18   0559   MG  1.40*  1.50*  1.70*      Phos:

## 2018-12-18 LAB
ALBUMIN SERPL-MCNC: 2.1 G/DL (ref 3.4–5)
ALP BLD-CCNC: 79 U/L (ref 40–129)
ALT SERPL-CCNC: 28 U/L (ref 10–40)
ANION GAP SERPL CALCULATED.3IONS-SCNC: 12 MMOL/L (ref 3–16)
ANION GAP SERPL CALCULATED.3IONS-SCNC: 16 MMOL/L (ref 3–16)
APTT: 105.1 SEC (ref 26–36)
AST SERPL-CCNC: 19 U/L (ref 15–37)
BASOPHILS ABSOLUTE: 0 K/UL (ref 0–0.2)
BASOPHILS RELATIVE PERCENT: 0.3 %
BILIRUB SERPL-MCNC: 0.9 MG/DL (ref 0–1)
BILIRUBIN DIRECT: 0.5 MG/DL (ref 0–0.3)
BILIRUBIN, INDIRECT: 0.4 MG/DL (ref 0–1)
BUN BLDV-MCNC: 12 MG/DL (ref 7–20)
BUN BLDV-MCNC: 22 MG/DL (ref 7–20)
CALCIUM SERPL-MCNC: 8.2 MG/DL (ref 8.3–10.6)
CALCIUM SERPL-MCNC: 8.4 MG/DL (ref 8.3–10.6)
CHLORIDE BLD-SCNC: 100 MMOL/L (ref 99–110)
CHLORIDE BLD-SCNC: 98 MMOL/L (ref 99–110)
CO2: 22 MMOL/L (ref 21–32)
CO2: 23 MMOL/L (ref 21–32)
CREAT SERPL-MCNC: 1.2 MG/DL (ref 0.9–1.3)
CREAT SERPL-MCNC: 1.4 MG/DL (ref 0.9–1.3)
EKG ATRIAL RATE: 117 BPM
EKG DIAGNOSIS: NORMAL
EKG P-R INTERVAL: 128 MS
EKG Q-T INTERVAL: 412 MS
EKG QRS DURATION: 156 MS
EKG QTC CALCULATION (BAZETT): 574 MS
EKG R AXIS: 237 DEGREES
EKG T AXIS: 93 DEGREES
EKG VENTRICULAR RATE: 117 BPM
EOSINOPHILS ABSOLUTE: 0 K/UL (ref 0–0.6)
EOSINOPHILS RELATIVE PERCENT: 0 %
GFR AFRICAN AMERICAN: >60
GFR AFRICAN AMERICAN: >60
GFR NON-AFRICAN AMERICAN: 52
GFR NON-AFRICAN AMERICAN: >60
GLUCOSE BLD-MCNC: 144 MG/DL (ref 70–99)
GLUCOSE BLD-MCNC: 301 MG/DL (ref 70–99)
GLUCOSE BLD-MCNC: 303 MG/DL (ref 70–99)
GLUCOSE BLD-MCNC: 338 MG/DL (ref 70–99)
GLUCOSE BLD-MCNC: 368 MG/DL (ref 70–99)
GLUCOSE BLD-MCNC: 447 MG/DL (ref 70–99)
HCT VFR BLD CALC: 45.7 % (ref 40.5–52.5)
HEMOGLOBIN: 14.2 G/DL (ref 13.5–17.5)
LYMPHOCYTES ABSOLUTE: 0.9 K/UL (ref 1–5.1)
LYMPHOCYTES RELATIVE PERCENT: 5.6 %
MAGNESIUM: 1.8 MG/DL (ref 1.8–2.4)
MAGNESIUM: 1.9 MG/DL (ref 1.8–2.4)
MCH RBC QN AUTO: 26.5 PG (ref 26–34)
MCHC RBC AUTO-ENTMCNC: 31.1 G/DL (ref 31–36)
MCV RBC AUTO: 85.1 FL (ref 80–100)
MONOCYTES ABSOLUTE: 0.5 K/UL (ref 0–1.3)
MONOCYTES RELATIVE PERCENT: 3.1 %
NEUTROPHILS ABSOLUTE: 15.3 K/UL (ref 1.7–7.7)
NEUTROPHILS RELATIVE PERCENT: 91 %
PDW BLD-RTO: 21.4 % (ref 12.4–15.4)
PERFORMED ON: ABNORMAL
PHOSPHORUS: 3.9 MG/DL (ref 2.5–4.9)
PHOSPHORUS: 4.3 MG/DL (ref 2.5–4.9)
PLATELET # BLD: 359 K/UL (ref 135–450)
PMV BLD AUTO: 7.7 FL (ref 5–10.5)
POTASSIUM REFLEX MAGNESIUM: 5.6 MMOL/L (ref 3.5–5.1)
POTASSIUM SERPL-SCNC: 5.8 MMOL/L (ref 3.5–5.1)
RBC # BLD: 5.37 M/UL (ref 4.2–5.9)
SODIUM BLD-SCNC: 133 MMOL/L (ref 136–145)
SODIUM BLD-SCNC: 138 MMOL/L (ref 136–145)
TOTAL PROTEIN: 6.2 G/DL (ref 6.4–8.2)
TROPONIN: 0.02 NG/ML
TROPONIN: 0.02 NG/ML
TROPONIN: 0.03 NG/ML
TROPONIN: 0.05 NG/ML
WBC # BLD: 16.8 K/UL (ref 4–11)

## 2018-12-18 PROCEDURE — 6360000002 HC RX W HCPCS: Performed by: INTERNAL MEDICINE

## 2018-12-18 PROCEDURE — 6370000000 HC RX 637 (ALT 250 FOR IP): Performed by: HOSPITALIST

## 2018-12-18 PROCEDURE — APPNB30 APP NON BILLABLE TIME 0-30 MINS: Performed by: NURSE PRACTITIONER

## 2018-12-18 PROCEDURE — G8979 MOBILITY GOAL STATUS: HCPCS

## 2018-12-18 PROCEDURE — 2580000003 HC RX 258

## 2018-12-18 PROCEDURE — 99233 SBSQ HOSP IP/OBS HIGH 50: CPT | Performed by: INTERNAL MEDICINE

## 2018-12-18 PROCEDURE — 93005 ELECTROCARDIOGRAM TRACING: CPT | Performed by: INTERNAL MEDICINE

## 2018-12-18 PROCEDURE — 6360000002 HC RX W HCPCS: Performed by: HOSPITALIST

## 2018-12-18 PROCEDURE — 99232 SBSQ HOSP IP/OBS MODERATE 35: CPT | Performed by: INTERNAL MEDICINE

## 2018-12-18 PROCEDURE — 6360000002 HC RX W HCPCS: Performed by: SURGERY

## 2018-12-18 PROCEDURE — 84100 ASSAY OF PHOSPHORUS: CPT

## 2018-12-18 PROCEDURE — 6370000000 HC RX 637 (ALT 250 FOR IP): Performed by: INTERNAL MEDICINE

## 2018-12-18 PROCEDURE — 93010 ELECTROCARDIOGRAM REPORT: CPT | Performed by: INTERNAL MEDICINE

## 2018-12-18 PROCEDURE — 2580000003 HC RX 258: Performed by: INTERNAL MEDICINE

## 2018-12-18 PROCEDURE — 85025 COMPLETE CBC W/AUTO DIFF WBC: CPT

## 2018-12-18 PROCEDURE — 97164 PT RE-EVAL EST PLAN CARE: CPT

## 2018-12-18 PROCEDURE — 6370000000 HC RX 637 (ALT 250 FOR IP): Performed by: SURGERY

## 2018-12-18 PROCEDURE — 6360000002 HC RX W HCPCS: Performed by: NURSE PRACTITIONER

## 2018-12-18 PROCEDURE — 84484 ASSAY OF TROPONIN QUANT: CPT

## 2018-12-18 PROCEDURE — 80076 HEPATIC FUNCTION PANEL: CPT

## 2018-12-18 PROCEDURE — 2000000000 HC ICU R&B

## 2018-12-18 PROCEDURE — 6370000000 HC RX 637 (ALT 250 FOR IP): Performed by: NURSE PRACTITIONER

## 2018-12-18 PROCEDURE — 80048 BASIC METABOLIC PNL TOTAL CA: CPT

## 2018-12-18 PROCEDURE — G8978 MOBILITY CURRENT STATUS: HCPCS

## 2018-12-18 PROCEDURE — 99024 POSTOP FOLLOW-UP VISIT: CPT | Performed by: SURGERY

## 2018-12-18 PROCEDURE — 97535 SELF CARE MNGMENT TRAINING: CPT

## 2018-12-18 PROCEDURE — 2580000003 HC RX 258: Performed by: HOSPITALIST

## 2018-12-18 PROCEDURE — 97530 THERAPEUTIC ACTIVITIES: CPT

## 2018-12-18 PROCEDURE — 36415 COLL VENOUS BLD VENIPUNCTURE: CPT

## 2018-12-18 PROCEDURE — 97168 OT RE-EVAL EST PLAN CARE: CPT

## 2018-12-18 PROCEDURE — 85730 THROMBOPLASTIN TIME PARTIAL: CPT

## 2018-12-18 PROCEDURE — APPSS30 APP SPLIT SHARED TIME 16-30 MINUTES: Performed by: NURSE PRACTITIONER

## 2018-12-18 PROCEDURE — 2580000003 HC RX 258: Performed by: SURGERY

## 2018-12-18 PROCEDURE — 83735 ASSAY OF MAGNESIUM: CPT

## 2018-12-18 RX ORDER — SODIUM CHLORIDE 9 MG/ML
INJECTION, SOLUTION INTRAVENOUS
Status: COMPLETED
Start: 2018-12-18 | End: 2018-12-18

## 2018-12-18 RX ORDER — HEPARIN SODIUM 5000 [USP'U]/ML
5000 INJECTION, SOLUTION INTRAVENOUS; SUBCUTANEOUS 2 TIMES DAILY
Status: DISCONTINUED | OUTPATIENT
Start: 2018-12-18 | End: 2018-12-18

## 2018-12-18 RX ORDER — INSULIN GLARGINE 100 [IU]/ML
18 INJECTION, SOLUTION SUBCUTANEOUS NIGHTLY
Status: DISCONTINUED | OUTPATIENT
Start: 2018-12-18 | End: 2018-12-21

## 2018-12-18 RX ORDER — HEPARIN SODIUM 1000 [USP'U]/ML
4000 INJECTION, SOLUTION INTRAVENOUS; SUBCUTANEOUS ONCE
Status: COMPLETED | OUTPATIENT
Start: 2018-12-18 | End: 2018-12-18

## 2018-12-18 RX ORDER — HEPARIN SODIUM 10000 [USP'U]/100ML
7.2 INJECTION, SOLUTION INTRAVENOUS CONTINUOUS
Status: DISCONTINUED | OUTPATIENT
Start: 2018-12-18 | End: 2018-12-21

## 2018-12-18 RX ORDER — HEPARIN SODIUM 5000 [USP'U]/ML
5000 INJECTION, SOLUTION INTRAVENOUS; SUBCUTANEOUS EVERY 8 HOURS SCHEDULED
Status: DISCONTINUED | OUTPATIENT
Start: 2018-12-18 | End: 2018-12-18

## 2018-12-18 RX ORDER — LAMIVUDINE 150 MG/1
150 TABLET, FILM COATED ORAL DAILY
Status: DISCONTINUED | OUTPATIENT
Start: 2018-12-19 | End: 2018-12-20

## 2018-12-18 RX ORDER — BISACODYL 10 MG
10 SUPPOSITORY, RECTAL RECTAL DAILY PRN
Status: DISCONTINUED | OUTPATIENT
Start: 2018-12-18 | End: 2018-12-27 | Stop reason: HOSPADM

## 2018-12-18 RX ORDER — MAGNESIUM SULFATE IN WATER 40 MG/ML
2 INJECTION, SOLUTION INTRAVENOUS ONCE
Status: COMPLETED | OUTPATIENT
Start: 2018-12-18 | End: 2018-12-18

## 2018-12-18 RX ORDER — MORPHINE SULFATE/0.9% NACL/PF 1 MG/ML
SYRINGE (ML) INJECTION CONTINUOUS
Status: DISCONTINUED | OUTPATIENT
Start: 2018-12-18 | End: 2018-12-18

## 2018-12-18 RX ORDER — NALOXONE HYDROCHLORIDE 0.4 MG/ML
0.4 INJECTION, SOLUTION INTRAMUSCULAR; INTRAVENOUS; SUBCUTANEOUS PRN
Status: DISCONTINUED | OUTPATIENT
Start: 2018-12-18 | End: 2018-12-27 | Stop reason: HOSPADM

## 2018-12-18 RX ORDER — INSULIN GLARGINE 100 [IU]/ML
18 INJECTION, SOLUTION SUBCUTANEOUS NIGHTLY
Status: DISCONTINUED | OUTPATIENT
Start: 2018-12-18 | End: 2018-12-18

## 2018-12-18 RX ADMIN — RALTEGRAVIR 400 MG: 400 TABLET, FILM COATED ORAL at 20:54

## 2018-12-18 RX ADMIN — VITAMIN D, TAB 1000IU (100/BT) 1000 UNITS: 25 TAB at 10:14

## 2018-12-18 RX ADMIN — Medication 10 ML: at 21:33

## 2018-12-18 RX ADMIN — PIPERACILLIN SODIUM,TAZOBACTAM SODIUM 3.38 G: 3; .375 INJECTION, POWDER, FOR SOLUTION INTRAVENOUS at 18:20

## 2018-12-18 RX ADMIN — HEPARIN SODIUM 4000 UNITS: 1000 INJECTION INTRAVENOUS; SUBCUTANEOUS at 16:15

## 2018-12-18 RX ADMIN — INSULIN LISPRO 2 UNITS: 100 INJECTION, SOLUTION INTRAVENOUS; SUBCUTANEOUS at 20:57

## 2018-12-18 RX ADMIN — INSULIN LISPRO 5 UNITS: 100 INJECTION, SOLUTION INTRAVENOUS; SUBCUTANEOUS at 09:59

## 2018-12-18 RX ADMIN — DEXTROSE MONOHYDRATE 150 MG: 50 INJECTION, SOLUTION INTRAVENOUS at 01:55

## 2018-12-18 RX ADMIN — INSULIN GLARGINE 18 UNITS: 100 INJECTION, SOLUTION SUBCUTANEOUS at 16:45

## 2018-12-18 RX ADMIN — MAGNESIUM SULFATE HEPTAHYDRATE 2 G: 40 INJECTION, SOLUTION INTRAVENOUS at 01:02

## 2018-12-18 RX ADMIN — AMIODARONE HYDROCHLORIDE 1 MG/MIN: 50 INJECTION, SOLUTION INTRAVENOUS at 00:55

## 2018-12-18 RX ADMIN — ONDANSETRON 4 MG: 2 INJECTION INTRAMUSCULAR; INTRAVENOUS at 21:29

## 2018-12-18 RX ADMIN — SODIUM CHLORIDE: 9 INJECTION, SOLUTION INTRAVENOUS at 10:09

## 2018-12-18 RX ADMIN — Medication 10 MG: at 14:21

## 2018-12-18 RX ADMIN — HEPARIN SODIUM 12.7 ML/HR: 10000 INJECTION, SOLUTION INTRAVENOUS at 19:14

## 2018-12-18 RX ADMIN — PANTOPRAZOLE SODIUM 40 MG: 40 TABLET, DELAYED RELEASE ORAL at 06:19

## 2018-12-18 RX ADMIN — ATORVASTATIN CALCIUM 80 MG: 80 TABLET, FILM COATED ORAL at 10:15

## 2018-12-18 RX ADMIN — SACUBITRIL AND VALSARTAN 1 TABLET: 24; 26 TABLET, FILM COATED ORAL at 20:54

## 2018-12-18 RX ADMIN — INSULIN LISPRO 12 UNITS: 100 INJECTION, SOLUTION INTRAVENOUS; SUBCUTANEOUS at 18:31

## 2018-12-18 RX ADMIN — INSULIN LISPRO 6 UNITS: 100 INJECTION, SOLUTION INTRAVENOUS; SUBCUTANEOUS at 12:50

## 2018-12-18 RX ADMIN — CALCIUM 500 MG: 500 TABLET ORAL at 10:13

## 2018-12-18 RX ADMIN — CALCIUM 500 MG: 500 TABLET ORAL at 20:54

## 2018-12-18 RX ADMIN — HEPARIN SODIUM 12.7 ML/HR: 10000 INJECTION, SOLUTION INTRAVENOUS at 16:02

## 2018-12-18 RX ADMIN — ASPIRIN 81 MG 81 MG: 81 TABLET ORAL at 10:14

## 2018-12-18 RX ADMIN — OXYCODONE AND ACETAMINOPHEN 1 TABLET: 5; 325 TABLET ORAL at 10:23

## 2018-12-18 RX ADMIN — LAMIVUDINE 300 MG: 150 TABLET, FILM COATED ORAL at 10:12

## 2018-12-18 RX ADMIN — MORPHINE SULFATE 30 MG: 1 INJECTION INTRAVENOUS at 12:35

## 2018-12-18 RX ADMIN — INSULIN LISPRO 3 UNITS: 100 INJECTION, SOLUTION INTRAVENOUS; SUBCUTANEOUS at 12:47

## 2018-12-18 RX ADMIN — PIPERACILLIN SODIUM,TAZOBACTAM SODIUM 3.38 G: 3; .375 INJECTION, POWDER, FOR SOLUTION INTRAVENOUS at 06:19

## 2018-12-18 RX ADMIN — PHENYLEPHRINE HYDROCHLORIDE 150 MCG/MIN: 10 INJECTION INTRAVENOUS at 00:58

## 2018-12-18 RX ADMIN — Medication 10 ML: at 10:17

## 2018-12-18 RX ADMIN — INSULIN LISPRO 5 UNITS: 100 INJECTION, SOLUTION INTRAVENOUS; SUBCUTANEOUS at 18:31

## 2018-12-18 RX ADMIN — ABACAVIR 600 MG: 300 TABLET, FILM COATED ORAL at 10:11

## 2018-12-18 RX ADMIN — INSULIN LISPRO 3 UNITS: 100 INJECTION, SOLUTION INTRAVENOUS; SUBCUTANEOUS at 09:57

## 2018-12-18 RX ADMIN — MONTELUKAST SODIUM 10 MG: 10 TABLET, COATED ORAL at 20:54

## 2018-12-18 RX ADMIN — SODIUM CHLORIDE: 9 INJECTION, SOLUTION INTRAVENOUS at 06:19

## 2018-12-18 RX ADMIN — RALTEGRAVIR 400 MG: 400 TABLET, FILM COATED ORAL at 10:12

## 2018-12-18 RX ADMIN — AMIODARONE HYDROCHLORIDE 1 MG/MIN: 50 INJECTION, SOLUTION INTRAVENOUS at 07:06

## 2018-12-18 RX ADMIN — PHENYLEPHRINE HYDROCHLORIDE 100 MCG/MIN: 10 INJECTION INTRAVENOUS at 09:48

## 2018-12-18 RX ADMIN — ENOXAPARIN SODIUM 30 MG: 30 INJECTION SUBCUTANEOUS at 12:44

## 2018-12-18 ASSESSMENT — PAIN DESCRIPTION - LOCATION
LOCATION: LEG

## 2018-12-18 ASSESSMENT — PAIN DESCRIPTION - ONSET
ONSET: ON-GOING
ONSET: ON-GOING

## 2018-12-18 ASSESSMENT — PAIN DESCRIPTION - PAIN TYPE
TYPE: ACUTE PAIN

## 2018-12-18 ASSESSMENT — PAIN SCALES - GENERAL
PAINLEVEL_OUTOF10: 0
PAINLEVEL_OUTOF10: 4
PAINLEVEL_OUTOF10: 8
PAINLEVEL_OUTOF10: 10
PAINLEVEL_OUTOF10: 8
PAINLEVEL_OUTOF10: 0
PAINLEVEL_OUTOF10: 8
PAINLEVEL_OUTOF10: 10
PAINLEVEL_OUTOF10: 8

## 2018-12-18 ASSESSMENT — PAIN DESCRIPTION - ORIENTATION
ORIENTATION: LEFT

## 2018-12-18 ASSESSMENT — PAIN DESCRIPTION - DESCRIPTORS
DESCRIPTORS: ACHING
DESCRIPTORS: ACHING

## 2018-12-18 ASSESSMENT — PAIN DESCRIPTION - FREQUENCY
FREQUENCY: CONTINUOUS
FREQUENCY: CONTINUOUS

## 2018-12-18 NOTE — ANESTHESIA POSTPROCEDURE EVALUATION
Department of Anesthesiology  Postprocedure Note    Patient: Ameena Zhao  MRN: [id number]  YOB: 1960  Date of evaluation: 12/18/2018  Time:  6:44 AM     Procedure Summary     Date:  12/17/18 Room / Location:  [facility] OR  / [facility] OR    Anesthesia Start:  3179 Anesthesia Stop:  1931    Procedure:  LEFT LEG FEMORAL ABOVE KNEE POPLITEAL BYPASS, REVERSE GREAT Stafenus VEIN (Left Leg Upper) Diagnosis:  (ATHEROSCLEROSIS OF NATIVE ARTERY OF EXTREMITY WITH ULCERATION)    Surgeon:  Don Glynn MD Responsible Provider:  Kizzy Ortiz MD    Anesthesia Type:  general ASA Status:  3          Anesthesia Type: general    Butch Phase I: Butch Score: 9    Butch Phase II:      Last vitals: Reviewed and per EMR flowsheets.      Vitals:    12/18/18 0545 12/18/18 0600 12/18/18 0615 12/18/18 0630   BP: (!) 78/60 89/72 100/64 96/63   Pulse: 103 91 95 94   Resp: 21 16 15 18   Temp:       TempSrc:       SpO2: 100% 100%  100%   Weight:       Height:         Anesthesia Post Evaluation    Patient location during evaluation: bedside  Patient participation: complete - patient participated  Level of consciousness: awake and alert  Airway patency: patent  Nausea & Vomiting: no nausea  Complications: no  Cardiovascular status: hemodynamically stable  Respiratory status: acceptable  Hydration status: euvolemic

## 2018-12-18 NOTE — PROGRESS NOTES
Assessment  Pain Assessment: 0-10  Pain Level: 8  Pain Type: Acute pain  Pain Location: Leg  Pain Orientation: Left  Pain Intervention(s): Ambulation/Increased activity;Repositioned  Vital Signs  Patient Currently in Pain: Yes   Social/Functional History  Social/Functional History  Lives With:  (roommate)  Type of Home: House  Home Layout: Laundry in basement  Home Access: Stairs to enter without rails  Entrance Stairs - Number of Steps: 4 steps from sidewalk to porch   Bathroom Shower/Tub: Tub/Shower unit  [employer]: Standard  Bathroom Equipment:  (None)  Bathroom Accessibility: Not accessible  Home Equipment:  (none)  ADL Assistance: Independent (sponge bathes at sink, has not showered since June)  Homemaking Assistance: Needs assistance (roommate assist with laundry, cooking and cleaning)  Ambulation Assistance: Independent  Transfer Assistance: Independent  Active : No  Additional Comments: Pt. SW and friends assist with transportation. Pt. denies recent falls.   Objective  AROM RLE (degrees)  RLE AROM: WFL  AROM LLE (degrees)  LLE General AROM: ankle and knee limited by pain d/t recent fem-pop bypass  Strength RLE  Strength RLE: WFL  Strength LLE  Comment: at least 3/5, no overpressure applied secondary to recent surgery     Sensation  Overall Sensation Status: Impaired  Additional Comments: tingling in LLE  Bed mobility  Supine to Sit: Stand by assistance (HOB elevated; increased time to complete secondary to pain)  Sit to Supine: Unable to assess (up in chair at end of session)  Scooting: Stand by assistance (at EOB; increased time to complete)  Transfers  Sit to Stand: Contact guard assistance (cues for hand placement)  Stand to sit: Contact guard assistance  Bed to Chair: Contact guard assistance (with RW and cues for safety with use of walker)  Ambulation  Ambulation?: No (unable to tolerate ambulation secondary to pain and low BP)     Balance  Sitting - Static: Good  Sitting - Dynamic:

## 2018-12-18 NOTE — PROGRESS NOTES
Frequency: Continuous  Clinical Progression: Not changed  Patient's Stated Pain Goal: 2  Effect of Pain on Daily Activities: impaired mobility, irritability  Pain Intervention(s): Medication (see eMar) (PCA pump)  Response to Pain Intervention: None  Oxygen Therapy  SpO2: 98 %  Pulse Oximeter Device Mode: Intermittent  Pulse Oximeter Device Location: Finger  O2 Device: None (Room air)  Social/Functional History  Social/Functional History  Lives With:  (roommate)  Type of Home: House  Home Layout: Laundry in basement  Home Access: Stairs to enter without rails  Entrance Stairs - Number of Steps: 4 steps from sidewalk to porch   Bathroom Shower/Tub: Tub/Shower unit  [employer]: Standard  Bathroom Equipment:  (None)  Bathroom Accessibility: Not accessible  Home Equipment:  (none)  ADL Assistance: Independent (sponge bathes at sink, has not showered since June)  Homemaking Assistance: Needs assistance (roommate assist with laundry, cooking and cleaning)  Ambulation Assistance: Independent  Transfer Assistance: Independent  Active : No  Additional Comments: Pt. SW and friends assist with transportation. Pt. denies recent falls.        Objective   Vision: Impaired  Vision Exceptions: Wears glasses at all times  Hearing: Within functional limits    Orientation  Overall Orientation Status: Within Functional Limits  Observation/Palpation  Observation: left LE lower leg dressing intact after debridement , 2 wound vacs  over surgical wounds on thigh s/p fem-pop bypass 12/17/18  Balance  Sitting Balance: Supervision (seated at EOB- slow to assume - c/o scrotal pain - suggest he try wearing undergarments  to  protect his scrotum from friction when moving- pt declined at this time.)  Standing Balance: Contact guard assistance (at RW)  Standing Balance  Activity: stood w/CGA x 1 and assist of another to monitor multiple lines lines , teransfer to chair- pt very slow to move but did well once he finally  focused on task and moved OOB. Tolerated slightly limited wt bearing on L LE using RW. Sit to stand: Contact guard assistance  Stand to sit: Contact guard assistance  Comment: stand step to chair from bed, advanced walker and B LE functionally , Min C/O pain i LE- Now has morphine pump. Pt aware his foot pain is a positive sign re: restoration of circulation. Functional Mobility  Functional - Mobility Device: Rolling Walker  Assist Level: Contact guard assistance ( x 1 and assist of another for lines)  Functional Mobility Comments: Did not attempt ambulating in room yet - lmited by lines. ADL  Feeding: Independent  Grooming: Setup;Supervision (bedside)  Toileting: Contact guard assistance;Minimal assistance ( at [facility] - limited by lines)  Additional Comments: expect need for set-up UE bath/dressing and Min A LB bath/derssing. Pt will have his partners assist 24/7 per pt report when he returns home. required set upto [address] for LE last visit w/ OT 12/12 . Pt reassessed after vascular procedure -fem pop. Tone RUE  RUE Tone: Normotonic  Tone LUE  LUE Tone: Normotonic  Coordination  Movements Are Fluid And Coordinated: Yes     Bed mobility  Supine to Sit: Stand by assistance (HOB elevated; increased time to complete secondary to pain)  Sit to Supine: Unable to assess (up in chair at end of session)  Scooting: Stand by assistance (at EOB; increased time to complete)  Transfers  Stand Step Transfers: Contact guard assistance  Sit to stand: Contact guard assistance  Stand to sit: Contact guard assistance  Transfer Comments: CGAA x 1 and assist of another for lines- VC for RW use     Cognition  Overall Cognitive Status: WFL  Cognition Comment: redirection to task-very talkative.  Questionable insight into medical situation        Sensation  Overall Sensation Status: Impaired  Additional Comments: tingling in LLE        LUE AROM (degrees)  LUE AROM : WFL  Left Hand AROM (degrees)  Left Hand General AROM: dupetryns contractures B hands 4th/5th digits approx 25% extension - hand function remains functional inspute of contractions   RUE AROM (degrees)  RUE AROM : WFL  Right Hand AROM (degrees)  Right Hand AROM: WFL  Right Hand General AROM: dupetryns contractures B hands 4th/5th digits approx 25% extension - hand function remains functional inspute of contractions   LUE Strength  Gross LUE Strength: WFL  RUE Strength  Gross RUE Strength: WFL    Assessment   Performance deficits / Impairments: Decreased functional mobility ; Decreased ADL status; Decreased strength;Decreased endurance;Decreased balance;Decreased cognition  Assessment: Pt is 62 y.o. M who presents with multiple open wounds on LLE. PTA pt lives with roommate in house with 4 SABINO. Pt reports having laundry in basement that roommate has been assisting with. Pt reports completing self-care tasks independently - sponge bathing at sink and has assistance for homemaking responsibilities. Currently, pt is completing  transfers with CGA  and RW- Pt is WBAT L LE s/p fem pop 12/17. Pt is  limited by LLE pain as he anticipates movement will be painful . Pt requiring increased assistance for ADLs needs at this time . Anticipate pt safe to d/c home with assistance from roommate and home health OT . Treatment Diagnosis: Decreased functional mobility ; Decreased ADL status; Decreased strength;Decreased endurance;Decreased balance;  Prognosis: Good  Decision Making: Medium Complexity  Exam: 6 deficit john  Assistance / Modification: CGA- ADL transfer / Min A LB ADLs and set up remainder  Patient Education: Role of OT,POC need for initial 24/7 assist at home and resources for ADL equipment - 3 in one commode not covered by insurance, .  RW should be covered by medicare  Barriers to Learning: distractable / anxiety anticipating pain  REQUIRES OT FOLLOW UP: Yes  Activity Tolerance  Activity Tolerance: Patient Tolerated treatment well;Treatment limited secondary to decreased cognition (Pt anxious about

## 2018-12-18 NOTE — BRIEF OP NOTE
Brief Postoperative Note  ______________________________________________________________    Patient: Corina Burkett  YOB: 1960  MRN: [id number]  Date of Procedure: 12/17/2018    Pre-Op Diagnosis: ATHEROSCLEROSIS OF NATIVE ARTERY OF EXTREMITY WITH ULCERATION    Post-Op Diagnosis: Same       Procedure(s):  LEFT LEG FEMORAL ABOVE KNEE POPLITEAL BYPASS, REVERSED gsv  Anesthesia: General    Surgeon(s):  MD RONA Alexander MD    Assistant: Jimmy ohugh    Estimated Blood Loss (mL): 699     Complications: None    Specimens:   * No specimens in log *    Implants:  * No implants in log *      Drains:   Urethral Catheter Latex;Straight-tip 16 fr (Active)       Findings: weak dopplers    Keyana Peng MD  Date: 12/17/2018  Time: 7:09 PM

## 2018-12-18 NOTE — PROGRESS NOTES
[facility]       Post-op note        Patient Identification  Maame Ny  :  1960  Admit Date:  2018    CC: leg pain    Post-op Day #1 fem pop bypass in situ    Subjective:      VT overnight, EKG changed, ? MI    Now on pressor and amio    Pain is :  Moderate        Objective:Patient Vitals for the past 24 hrs:   BP Temp Temp src Pulse Resp SpO2 Height Weight   18 1000 (!) 86/58 98 °F (36.7 °C) Oral 102 17 100 % - -   18 0900 (!) 101/58 - - 99 12 100 % - -   18 0800 108/68 - - 107 17 100 % - -   18 0700 99/82 - - 92 17 100 % - -   18 0645 110/63 - - 108 17 100 % - -   18 0630 96/63 - - 94 18 100 % - -   18 0615 100/64 - - 95 15 - - -   18 0600 89/72 - - 91 16 100 % - -   18 0545 (!) 78/60 - - 103 21 100 % - -   18 0530 93/61 - - 93 17 100 % - -   18 0515 97/61 - - 98 18 100 % - -   18 0500 103/61 - - 97 17 100 % - 182 lb 15.7 oz (83 kg)   18 0445 (!) 93/59 - - 91 17 100 % - -   18 0430 84/60 - - 97 13 100 % - -   18 0415 103/68 - - 97 16 99 % - -   18 0400 99/70 97.8 °F (36.6 °C) Oral 93 16 100 % - -   18 0345 100/65 - - 98 17 100 % - -   18 0330 107/69 - - 100 10 100 % - -   18 0315 108/73 - - 113 8 100 % - -   18 0300 95/60 - - 94 16 100 % - -   18 0245 106/69 - - 115 19 100 % - -   18 0230 101/71 - - 112 18 96 % - -   18 0215 122/85 - - 112 19 98 % - -   18 0200 85/65 - - 116 21 98 % - -   18 0100 (!) 71/46 - - 124 25 100 % - -   18 0000 (!) 61/40 97.3 °F (36.3 °C) Oral 133 17 98 % - -   18 2330 (!) 69/44 - - 136 17 97 % - -   18 2315 (!) 84/59 - - 137 16 98 % - -   18 2300 (!) 78/57 - - 137 18 98 % - -   18 2245 (!) 92/57 - - 136 17 99 % - -   18 2230 (!) 78/51 - - 135 18 100 % - -   18 2215 (!) 82/57 - - 138 22 100 % - -   18 2200 (!) 80/58 - - 138 20 98 % - - 30 mg Subcutaneous Q12H    metoprolol succinate  75 mg Oral BID    potassium chloride  20 mEq Oral BID    digoxin  125 mcg Oral Daily    furosemide  80 mg Intravenous BID    sacubitril-valsartan  1 tablet Oral BID    insulin glargine  15 Units Subcutaneous Nightly    insulin lispro  0-6 Units Subcutaneous TID WC    insulin lispro  0-3 Units Subcutaneous Nightly    insulin lispro  3 Units Subcutaneous TID WC    spironolactone  25 mg Oral Daily    calcium elemental  500 mg Oral BID    vitamin D  1,000 Units Oral Daily    piperacillin-tazobactam  3.375 g Intravenous Q8H    atorvastatin  80 mg Oral Daily    aspirin  81 mg Oral Daily    montelukast  10 mg Oral Nightly    pantoprazole  40 mg Oral QAM AC    raltegravir  400 mg Oral BID    sodium chloride flush  10 mL Intravenous 2 times per day    abacavir  600 mg Oral Daily    lamiVUDine  300 mg Oral Daily     Continuous Infusions:   amiodarone 450mg/250ml D5W infusion 1 mg/min (12/18/18 0706)    phenylephrine (BRANDEE-SYNEPHRINE) 50mg/250mL infusion 100 mcg/min (12/18/18 0948)    morphine      dextrose      sodium chloride 75 mL/hr at 12/18/18 1009    dextrose       PRN Meds:.naloxone, bisacodyl, glucose, dextrose, glucagon (rDNA), dextrose, acetaminophen, sodium chloride, ondansetron, morphine, oxyCODONE-acetaminophen, diphenhydrAMINE, sodium chloride flush, glucose, dextrose, glucagon (rDNA), dextrose    Assessment/Plan:      62 y.o. male admitted with   1. Wound infection    2. Tachycardia    3. Elevated troponin    4. Amphetamine abuse ([facility]    5.  Human immunodeficiency virus (HIV) disease ([facility]     with a history of    Patient Active Problem List   Diagnosis    Wounds, multiple open, lower extremity, left, initial encounter    Wound infection    PAD (peripheral artery disease) ([facility]    Amphetamine abuse ([facility]    Elevated troponin    [facility] immunodeficiency virus ([facility]) disease [facility]    HIV (human immunodeficiency virus infection) ([facility]  Atrial flutter (HCC)    Ischemic cardiomyopathy    Pyogenic inflammation of bone (HCC)    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 12/10/18    Sarah dressing reinforced this AM, working well will keep on 5-7 days  PT/OT  Hayden removed, if unable to void, bladder scan, leave in dwelling cath if need to straight cath x2  Pain control, now on PCA  Wound care - wet to dry dressing changed daily - changed today, no purulent drainage, bone exposed. Pain with dressing change  prn pain meds - minimize narcotics  Continue medical care  abtx per ID - wound cx with multiple organisms   OK to resume anticoagulation, was initiated per cardiology for afib - cardiology following  Follow exam     Discussed with patient and nursing. Discussed with Dr. Festus Garza:  Educated patient on plan of care and disease process. - all questions answered      Torie Young CNP  Pt seen and examined. Agree with JUDY Willson note. Labs reviewed.

## 2018-12-18 NOTE — OP NOTE
pulse at the knee. The membranes' only runoff was the  peroneal, and to protect these incisions from his infected wound, we had  prepped from top down and then down to the foot and covered the whole  wound and calf with an Ioban circumferentially and the foot with a  glove. So we reversed the heparin, put some Fibrillar in, made sure the  fields were dry, and closed the wound with 2-0 and 3-0 Vicryl in the  groin and in the knee, the counterincisions with two layers of 3-0  Vicryl, and staples on all wounds and a SASKIA VAC placed to the groin and  upper thigh incision and a separate one on the knee incision. The one  counterincision was just covered with gauze and Tegaderms. We checked with the Doppler and he still only had weak Dopplers in his  foot, but on the angiogram, these were only filled by collaterals, so we  will see if this is enough, but really, there is not much else to do. I  would not put a Niles-Jt graft in this patient because of the  multiple-cultured bacteria in his leg, his resistance of infection being  low because of his HIV, and he is also nutritionally depleted with low  albumin etc. and he still has a high risk of limb loss.         Stacy Ziegler MD    D: 12/17/2018 19:49:33       T: 12/18/2018 3:04:41     [email or web address]  Job#: [id number]     Doc#: [id number]    CC:  MD Tamika Pisano MD

## 2018-12-19 ENCOUNTER — APPOINTMENT (OUTPATIENT)
Dept: GENERAL RADIOLOGY | Age: 58
DRG: 253 | End: 2018-12-19
Payer: MEDICARE

## 2018-12-19 LAB
ALBUMIN SERPL-MCNC: 2.3 G/DL (ref 3.4–5)
ALP BLD-CCNC: 72 U/L (ref 40–129)
ALT SERPL-CCNC: 27 U/L (ref 10–40)
ANION GAP SERPL CALCULATED.3IONS-SCNC: 12 MMOL/L (ref 3–16)
APTT: 52.4 SEC (ref 26–36)
APTT: 59.1 SEC (ref 26–36)
APTT: 59.6 SEC (ref 26–36)
AST SERPL-CCNC: 18 U/L (ref 15–37)
BASOPHILS ABSOLUTE: 0.1 K/UL (ref 0–0.2)
BASOPHILS RELATIVE PERCENT: 0.5 %
BILIRUB SERPL-MCNC: 0.6 MG/DL (ref 0–1)
BILIRUBIN DIRECT: 0.4 MG/DL (ref 0–0.3)
BILIRUBIN, INDIRECT: 0.2 MG/DL (ref 0–1)
BUN BLDV-MCNC: 29 MG/DL (ref 7–20)
CALCIUM SERPL-MCNC: 8.2 MG/DL (ref 8.3–10.6)
CHLORIDE BLD-SCNC: 95 MMOL/L (ref 99–110)
CO2: 25 MMOL/L (ref 21–32)
CREAT SERPL-MCNC: 1.6 MG/DL (ref 0.9–1.3)
DIGOXIN LEVEL: 0.4 NG/ML (ref 0.8–2)
EOSINOPHILS ABSOLUTE: 0.1 K/UL (ref 0–0.6)
EOSINOPHILS RELATIVE PERCENT: 0.6 %
GFR AFRICAN AMERICAN: 54
GFR NON-AFRICAN AMERICAN: 44
GLUCOSE BLD-MCNC: 128 MG/DL (ref 70–99)
GLUCOSE BLD-MCNC: 134 MG/DL (ref 70–99)
GLUCOSE BLD-MCNC: 154 MG/DL (ref 70–99)
GLUCOSE BLD-MCNC: 168 MG/DL (ref 70–99)
GLUCOSE BLD-MCNC: 191 MG/DL (ref 70–99)
HCT VFR BLD CALC: 38.1 % (ref 40.5–52.5)
HEMOGLOBIN: 11.8 G/DL (ref 13.5–17.5)
LYMPHOCYTES ABSOLUTE: 1.7 K/UL (ref 1–5.1)
LYMPHOCYTES RELATIVE PERCENT: 14.2 %
MAGNESIUM: 1.9 MG/DL (ref 1.8–2.4)
MCH RBC QN AUTO: 25.9 PG (ref 26–34)
MCHC RBC AUTO-ENTMCNC: 31 G/DL (ref 31–36)
MCV RBC AUTO: 83.3 FL (ref 80–100)
MONOCYTES ABSOLUTE: 0.6 K/UL (ref 0–1.3)
MONOCYTES RELATIVE PERCENT: 5.3 %
NEUTROPHILS ABSOLUTE: 9.4 K/UL (ref 1.7–7.7)
NEUTROPHILS RELATIVE PERCENT: 79.4 %
PDW BLD-RTO: 21.1 % (ref 12.4–15.4)
PERFORMED ON: ABNORMAL
PHOSPHORUS: 4.2 MG/DL (ref 2.5–4.9)
PLATELET # BLD: 282 K/UL (ref 135–450)
PMV BLD AUTO: 7.2 FL (ref 5–10.5)
POTASSIUM REFLEX MAGNESIUM: 4.5 MMOL/L (ref 3.5–5.1)
RBC # BLD: 4.57 M/UL (ref 4.2–5.9)
SODIUM BLD-SCNC: 132 MMOL/L (ref 136–145)
TOTAL PROTEIN: 6.2 G/DL (ref 6.4–8.2)
TROPONIN: 0.02 NG/ML
TROPONIN: 0.02 NG/ML
TROPONIN: 0.03 NG/ML
WBC # BLD: 11.9 K/UL (ref 4–11)

## 2018-12-19 PROCEDURE — 2580000003 HC RX 258: Performed by: NURSE PRACTITIONER

## 2018-12-19 PROCEDURE — 84100 ASSAY OF PHOSPHORUS: CPT

## 2018-12-19 PROCEDURE — 85025 COMPLETE CBC W/AUTO DIFF WBC: CPT

## 2018-12-19 PROCEDURE — 6370000000 HC RX 637 (ALT 250 FOR IP): Performed by: INTERNAL MEDICINE

## 2018-12-19 PROCEDURE — 2580000003 HC RX 258: Performed by: INTERNAL MEDICINE

## 2018-12-19 PROCEDURE — 99024 POSTOP FOLLOW-UP VISIT: CPT | Performed by: SURGERY

## 2018-12-19 PROCEDURE — 2580000003 HC RX 258: Performed by: HOSPITALIST

## 2018-12-19 PROCEDURE — 76937 US GUIDE VASCULAR ACCESS: CPT

## 2018-12-19 PROCEDURE — 97605 NEG PRS WND THER DME<=50SQCM: CPT

## 2018-12-19 PROCEDURE — 80162 ASSAY OF DIGOXIN TOTAL: CPT

## 2018-12-19 PROCEDURE — 6360000002 HC RX W HCPCS: Performed by: HOSPITALIST

## 2018-12-19 PROCEDURE — APPSS30 APP SPLIT SHARED TIME 16-30 MINUTES: Performed by: NURSE PRACTITIONER

## 2018-12-19 PROCEDURE — 6370000000 HC RX 637 (ALT 250 FOR IP): Performed by: SURGERY

## 2018-12-19 PROCEDURE — 2000000000 HC ICU R&B

## 2018-12-19 PROCEDURE — 6360000002 HC RX W HCPCS: Performed by: INTERNAL MEDICINE

## 2018-12-19 PROCEDURE — 6360000002 HC RX W HCPCS: Performed by: NURSE PRACTITIONER

## 2018-12-19 PROCEDURE — 6370000000 HC RX 637 (ALT 250 FOR IP): Performed by: HOSPITALIST

## 2018-12-19 PROCEDURE — 2580000003 HC RX 258: Performed by: SURGERY

## 2018-12-19 PROCEDURE — 97110 THERAPEUTIC EXERCISES: CPT

## 2018-12-19 PROCEDURE — 36415 COLL VENOUS BLD VENIPUNCTURE: CPT

## 2018-12-19 PROCEDURE — 36569 INSJ PICC 5 YR+ W/O IMAGING: CPT

## 2018-12-19 PROCEDURE — 84484 ASSAY OF TROPONIN QUANT: CPT

## 2018-12-19 PROCEDURE — APPNB30 APP NON BILLABLE TIME 0-30 MINS: Performed by: NURSE PRACTITIONER

## 2018-12-19 PROCEDURE — 02HV33Z INSERTION OF INFUSION DEVICE INTO SUPERIOR VENA CAVA, PERCUTANEOUS APPROACH: ICD-10-PCS | Performed by: INTERNAL MEDICINE

## 2018-12-19 PROCEDURE — C1751 CATH, INF, PER/CENT/MIDLINE: HCPCS

## 2018-12-19 PROCEDURE — 99233 SBSQ HOSP IP/OBS HIGH 50: CPT | Performed by: INTERNAL MEDICINE

## 2018-12-19 PROCEDURE — 80048 BASIC METABOLIC PNL TOTAL CA: CPT

## 2018-12-19 PROCEDURE — 71045 X-RAY EXAM CHEST 1 VIEW: CPT

## 2018-12-19 PROCEDURE — 97530 THERAPEUTIC ACTIVITIES: CPT

## 2018-12-19 PROCEDURE — 94762 N-INVAS EAR/PLS OXIMTRY CONT: CPT

## 2018-12-19 PROCEDURE — 85730 THROMBOPLASTIN TIME PARTIAL: CPT

## 2018-12-19 PROCEDURE — 97535 SELF CARE MNGMENT TRAINING: CPT

## 2018-12-19 PROCEDURE — 99232 SBSQ HOSP IP/OBS MODERATE 35: CPT | Performed by: INTERNAL MEDICINE

## 2018-12-19 PROCEDURE — 80076 HEPATIC FUNCTION PANEL: CPT

## 2018-12-19 PROCEDURE — 83735 ASSAY OF MAGNESIUM: CPT

## 2018-12-19 PROCEDURE — 99223 1ST HOSP IP/OBS HIGH 75: CPT | Performed by: INTERNAL MEDICINE

## 2018-12-19 PROCEDURE — 97116 GAIT TRAINING THERAPY: CPT

## 2018-12-19 RX ORDER — 0.9 % SODIUM CHLORIDE 0.9 %
1000 INTRAVENOUS SOLUTION INTRAVENOUS ONCE
Status: DISCONTINUED | OUTPATIENT
Start: 2018-12-19 | End: 2018-12-19

## 2018-12-19 RX ORDER — MAGNESIUM SULFATE IN WATER 40 MG/ML
2 INJECTION, SOLUTION INTRAVENOUS ONCE
Status: COMPLETED | OUTPATIENT
Start: 2018-12-19 | End: 2018-12-19

## 2018-12-19 RX ORDER — LIDOCAINE HYDROCHLORIDE 10 MG/ML
5 INJECTION, SOLUTION EPIDURAL; INFILTRATION; INTRACAUDAL; PERINEURAL ONCE
Status: DISCONTINUED | OUTPATIENT
Start: 2018-12-19 | End: 2018-12-27 | Stop reason: HOSPADM

## 2018-12-19 RX ORDER — METOPROLOL SUCCINATE 25 MG/1
12.5 TABLET, EXTENDED RELEASE ORAL DAILY
Status: DISCONTINUED | OUTPATIENT
Start: 2018-12-19 | End: 2018-12-27 | Stop reason: HOSPADM

## 2018-12-19 RX ORDER — SODIUM CHLORIDE 0.9 % (FLUSH) 0.9 %
10 SYRINGE (ML) INJECTION EVERY 12 HOURS SCHEDULED
Status: DISCONTINUED | OUTPATIENT
Start: 2018-12-19 | End: 2018-12-27 | Stop reason: HOSPADM

## 2018-12-19 RX ORDER — SODIUM CHLORIDE 0.9 % (FLUSH) 0.9 %
10 SYRINGE (ML) INJECTION PRN
Status: DISCONTINUED | OUTPATIENT
Start: 2018-12-19 | End: 2018-12-27 | Stop reason: HOSPADM

## 2018-12-19 RX ORDER — 0.9 % SODIUM CHLORIDE 0.9 %
500 INTRAVENOUS SOLUTION INTRAVENOUS ONCE
Status: COMPLETED | OUTPATIENT
Start: 2018-12-19 | End: 2018-12-19

## 2018-12-19 RX ORDER — HEPARIN SODIUM 1000 [USP'U]/ML
2000 INJECTION, SOLUTION INTRAVENOUS; SUBCUTANEOUS ONCE
Status: COMPLETED | OUTPATIENT
Start: 2018-12-19 | End: 2018-12-19

## 2018-12-19 RX ADMIN — HEPARIN SODIUM 11.9 ML/HR: 10000 INJECTION, SOLUTION INTRAVENOUS at 12:27

## 2018-12-19 RX ADMIN — RALTEGRAVIR 400 MG: 400 TABLET, FILM COATED ORAL at 20:40

## 2018-12-19 RX ADMIN — MONTELUKAST SODIUM 10 MG: 10 TABLET, COATED ORAL at 20:40

## 2018-12-19 RX ADMIN — INSULIN LISPRO 3 UNITS: 100 INJECTION, SOLUTION INTRAVENOUS; SUBCUTANEOUS at 17:36

## 2018-12-19 RX ADMIN — ATORVASTATIN CALCIUM 80 MG: 80 TABLET, FILM COATED ORAL at 09:15

## 2018-12-19 RX ADMIN — INSULIN LISPRO 3 UNITS: 100 INJECTION, SOLUTION INTRAVENOUS; SUBCUTANEOUS at 12:08

## 2018-12-19 RX ADMIN — AMIODARONE HYDROCHLORIDE 0.5 MG/MIN: 50 INJECTION, SOLUTION INTRAVENOUS at 17:38

## 2018-12-19 RX ADMIN — PANTOPRAZOLE SODIUM 40 MG: 40 TABLET, DELAYED RELEASE ORAL at 06:06

## 2018-12-19 RX ADMIN — LAMIVUDINE 150 MG: 150 TABLET, FILM COATED ORAL at 11:12

## 2018-12-19 RX ADMIN — PIPERACILLIN SODIUM,TAZOBACTAM SODIUM 3.38 G: 3; .375 INJECTION, POWDER, FOR SOLUTION INTRAVENOUS at 18:41

## 2018-12-19 RX ADMIN — METOPROLOL SUCCINATE 12.5 MG: 25 TABLET, EXTENDED RELEASE ORAL at 18:49

## 2018-12-19 RX ADMIN — INSULIN GLARGINE 18 UNITS: 100 INJECTION, SOLUTION SUBCUTANEOUS at 20:40

## 2018-12-19 RX ADMIN — Medication 0.2 MG: at 12:12

## 2018-12-19 RX ADMIN — AMIODARONE HYDROCHLORIDE 0.5 MG/MIN: 50 INJECTION, SOLUTION INTRAVENOUS at 01:44

## 2018-12-19 RX ADMIN — MAGNESIUM SULFATE HEPTAHYDRATE 2 G: 40 INJECTION, SOLUTION INTRAVENOUS at 14:22

## 2018-12-19 RX ADMIN — SODIUM CHLORIDE: 9 INJECTION, SOLUTION INTRAVENOUS at 00:47

## 2018-12-19 RX ADMIN — Medication 0.2 MG: at 10:31

## 2018-12-19 RX ADMIN — ABACAVIR 600 MG: 300 TABLET, FILM COATED ORAL at 11:11

## 2018-12-19 RX ADMIN — ASPIRIN 81 MG 81 MG: 81 TABLET ORAL at 09:14

## 2018-12-19 RX ADMIN — INSULIN LISPRO 2 UNITS: 100 INJECTION, SOLUTION INTRAVENOUS; SUBCUTANEOUS at 20:41

## 2018-12-19 RX ADMIN — SODIUM CHLORIDE 500 ML: 9 INJECTION, SOLUTION INTRAVENOUS at 11:04

## 2018-12-19 RX ADMIN — VITAMIN D, TAB 1000IU (100/BT) 1000 UNITS: 25 TAB at 09:15

## 2018-12-19 RX ADMIN — PHENYLEPHRINE HYDROCHLORIDE 50 MCG/MIN: 10 INJECTION INTRAVENOUS at 00:43

## 2018-12-19 RX ADMIN — DIGOXIN 125 MCG: 125 TABLET ORAL at 09:14

## 2018-12-19 RX ADMIN — PIPERACILLIN SODIUM,TAZOBACTAM SODIUM 3.38 G: 3; .375 INJECTION, POWDER, FOR SOLUTION INTRAVENOUS at 06:06

## 2018-12-19 RX ADMIN — SODIUM CHLORIDE: 9 INJECTION, SOLUTION INTRAVENOUS at 17:38

## 2018-12-19 RX ADMIN — CALCIUM 500 MG: 500 TABLET ORAL at 20:40

## 2018-12-19 RX ADMIN — CALCIUM 500 MG: 500 TABLET ORAL at 09:15

## 2018-12-19 RX ADMIN — HEPARIN SODIUM 2000 UNITS: 1000 INJECTION INTRAVENOUS; SUBCUTANEOUS at 09:48

## 2018-12-19 RX ADMIN — Medication 10 ML: at 20:47

## 2018-12-19 RX ADMIN — RALTEGRAVIR 400 MG: 400 TABLET, FILM COATED ORAL at 11:12

## 2018-12-19 ASSESSMENT — PAIN DESCRIPTION - ORIENTATION
ORIENTATION: LEFT

## 2018-12-19 ASSESSMENT — PAIN SCALES - GENERAL
PAINLEVEL_OUTOF10: 8
PAINLEVEL_OUTOF10: 4
PAINLEVEL_OUTOF10: 8
PAINLEVEL_OUTOF10: 4
PAINLEVEL_OUTOF10: 8
PAINLEVEL_OUTOF10: 8

## 2018-12-19 ASSESSMENT — PAIN DESCRIPTION - DESCRIPTORS
DESCRIPTORS: ACHING
DESCRIPTORS: ACHING
DESCRIPTORS: ACHING;CONSTANT
DESCRIPTORS: ACHING

## 2018-12-19 ASSESSMENT — PAIN DESCRIPTION - LOCATION
LOCATION: LEG

## 2018-12-19 ASSESSMENT — PAIN DESCRIPTION - PAIN TYPE
TYPE: ACUTE PAIN
TYPE: ACUTE PAIN;SURGICAL PAIN
TYPE: ACUTE PAIN
TYPE: ACUTE PAIN;SURGICAL PAIN
TYPE: ACUTE PAIN
TYPE: ACUTE PAIN;SURGICAL PAIN
TYPE: ACUTE PAIN
TYPE: ACUTE PAIN

## 2018-12-19 ASSESSMENT — PAIN DESCRIPTION - FREQUENCY: FREQUENCY: CONTINUOUS

## 2018-12-19 NOTE — PROGRESS NOTES
Hospitalist Progress Note  12/19/2018 8:20 AM    PCP: Samantha Teresa MD    [id number]     Date of Admission: 12/9/2018                                                                                                                     HOSPITAL COURSE    Patient demographics:  The patient  Zena aJy is a 62 y.o. male     Significant past medical history:   Patient Active Problem List   Diagnosis    Wounds, multiple open, lower extremity, left, initial encounter    Wound infection    PAD (peripheral artery disease) ([address]    Amphetamine abuse ([address]    Elevated troponin    Human immunodeficiency virus (HIV) disease ([address]    HIV (human immunodeficiency virus infection) ([address]    Atrial flutter ([address]    Ischemic cardiomyopathy    Pyogenic inflammation of bone ([address]    Atherosclerosis of native artery of extremity with ulceration ([address]    Hypokalemia    Hyperkalemia    Hypotension due to drugs         Presenting symptoms:  Leg wound    Diagnostic workup:  XR TIBIA FIBULA LEFT   Echo      CONSULTS DURING ADMISSION :   PHARMACY TO DOSE VANCOMYCIN  IP CONSULT TO HOSPITALIST  IP CONSULT TO SOCIAL WORK  IP CONSULT TO VASCULAR SURGERY  IP CONSULT TO SPIRITUAL SERVICES  IP CONSULT TO INFECTIOUS DISEASES  IP CONSULT TO CARDIOLOGY  PHARMACY TO DOSE MEDICATION  IP CONSULT TO UROLOGY  IP CONSULT TO DIABETES EDUCATOR      Patient was diagnosed with:        Treatment while inpatient:                                                                                         ----------------------------------------------------------      SUBJECTIVE COMPLAINTS- Follow-up for leg ulcers and peripheral vascular disease    Diet: DIET CARB CONTROL; Carb Control: 5 carb choices (75 gms)/meal      OBJECTIVE:   Patient Active Problem List   Diagnosis    Wounds, multiple open, lower extremity, left, initial encounter    Wound infection    PAD (peripheral artery disease) ([address]    Amphetamine abuse ([address]    Elevated proposed and they agreed with plan. Iris Handy MD    This note was transcribed using [id number]. Please disregard any translational errors.

## 2018-12-19 NOTE — FLOWSHEET NOTE
12/10/18 1455   Encounter Summary   Services provided to: Patient   Referral/Consult From: Nurse   Support System Parent; Family members   Place of Holiness No [facility]   Volunteer Visit (visit, prayer, jasen, AD discussion 12/10 CL)   Length of Encounter 15 minutes   Spiritual Assessment Completed Yes   Spiritual/Mu-ism   Type Spiritual support   Assessment Calm; Approachable; Hopeful;Coping   Intervention Active listening;Explored feelings, thoughts, concerns;Prayer;Discussed belief system/Restorationist practices/andriy;Discussed illness/injury and it's impact   Outcome Engaged in conversation;Coping; Hopeful   Advance Directives (For Healthcare)   Healthcare Directive No, patient does not have an advance directive for healthcare treatment   Advance Directives Documents given;Documents explained  (AD doc protocol complete)   Electronically signed by Micah Lau on 12/10/2018 at 2:56 PM
plastic drape.  [x] Cut a hole in this plastic drape directly over the sponge the same size as the plastic drain tubing.  [x] Removed plastic liner from flange and apply it directly over the hole you cut.  [x] Removed the plastic cover from the flange.  [x] Attached the tubing to the wound/ulcer Negative Pressure Therapy and turn it on to be sure a vacuum is created and that there are no leaks.  [x] If air leaks occur, use plastic drape to patch them.  [x] Secured Veraflo with Instillation Negative Pressure Therapy dressing.  Applied per  Guidelines     Placed 12/19/18      Electronically signed by Nila Fall RN on 12/19/2018 at 4:59 PM

## 2018-12-19 NOTE — CONSULTS
crackles. No wheezes. No rhonchi. CV: Regular rate. Regular rhythm. No murmur or rub. GI: Non-tender. Non-distended. No hernia. BS present. Skin: Warm and dry. No nodule on exposed extremities. Lymph: No cervical LAD. No supraclavicular LAD. M/S: No cyanosis. No joint deformity. Neuro: Awake. Alert. Moves all four extremities. EXT:  Left wound vac in place, chronic stasis changes    LABS:  CBC:   Recent Labs      12/17/18   0559  12/18/18   0438  12/19/18   0441   WBC  8.5  16.8*  11.9*   HGB  10.7*  14.2  11.8*   HCT  33.8*  45.7  38.1*   MCV  83.7  85.1  83.3   PLT  202  359  282     BMP:   Recent Labs      12/18/18   0059  12/18/18   0438  12/19/18   0441   NA  138  133*  132*   K  5.8*  5.6*  4.5   CL  100  98*  95*   CO2  22  23  25   PHOS  3.9  4.3  4.2   BUN  12  22*  29*   CREATININE  1.2  1.4*  1.6*     LIVER PROFILE:   Recent Labs      12/17/18   0559  12/18/18   0438  12/19/18   0441   AST  18 19  18   ALT  36  28  27   BILIDIR  0.3  0.5*  0.4*   BILITOT  0.6  0.9  0.6   ALKPHOS  92  79  72     PT/INR: No results for input(s): PROTIME, INR in the last 72 hours. APTT:   Recent Labs      12/17/18   0559  12/18/18   2214  12/19/18   0710   APTT  64.1*  105.1*  52.4*     UA:No results for input(s): NITRITE, COLORU, PHUR, LABCAST, WBCUA, RBCUA, MUCUS, TRICHOMONAS, YEAST, BACTERIA, CLARITYU, SPECGRAV, LEUKOCYTESUR, UROBILINOGEN, BILIRUBINUR, BLOODU, GLUCOSEU, AMORPHOUS in the last 72 hours. Invalid input(s): KETONESU  No results for input(s): PHART, KPG3BCY, PO2ART in the last 72 hours. Cultures:  Wound:  Polymicrobial organisms        ECHO: 12/2018  Patient appears to be in atrial fibrillation.   Overall left ventricular systolic function appears severely reduced.   Ejection fraction is visually estimated to be 20% with diffuse hypokinesis   and minor regional variations. Normal left ventricular wall thickness and   cavity size. Spontaneous echo contrast noted in the left ventricle. Possible   small apical mural thrombus.   The right ventricle appears mildly dilated with severely reduced systolic   function.   The left atrium is severely dilated.   The right atrium is moderately dilated.   Trivial mitral regurgitation.   Mild tricuspid regurgitation.   Small pericardial effusion without tamponade physiology.   Possible ASD/PFO based on color Doppler.  IVC size is dilated (>2.1 cm) and collapses < 50% with respiration   consistent with elevated RA pressure (15 mmHg).  Estimated pulmonary artery systolic pressure is mildly elevated at 40 mmHg   assuming a right atrial pressure of 15 mmHg. ABG:  None    I reviewed all the above labs and studies pertaining to this visit. ASSESSMENT:  · Shock:  Septic vs other  · PVD  · Necrotizing infection   · HIV  · Prolonged QTc    PLAN:  · Increase NS to 75 ml with bolus. He is not on oxygen nor does he have pulmonary edema. He can probably tolerate the IV fluids. Need to improve perfusion  · DC Neosynephrine. Probably worst thing for his PVD at this time  · Hold lasix while on pressors  · Hold aldactone due hyperkalemia   · Hold all anti-hypertensive medications due to blood pressure.   This too will not help perfusion   · Remove Zofran  · Antibiotics per ID  · DVT prophylaxis with heparin     Thanks    DO [facility]

## 2018-12-19 NOTE — PROGRESS NOTES
Subcutaneous TID WC    insulin lispro  0-18 Units Subcutaneous TID WC    insulin lispro  0-9 Units Subcutaneous Nightly    insulin glargine  18 Units Subcutaneous Nightly    metoprolol succinate  75 mg Oral BID    digoxin  125 mcg Oral Daily    furosemide  80 mg Intravenous BID    sacubitril-valsartan  1 tablet Oral BID    spironolactone  25 mg Oral Daily    calcium elemental  500 mg Oral BID    vitamin D  1,000 Units Oral Daily    atorvastatin  80 mg Oral Daily    aspirin  81 mg Oral Daily    montelukast  10 mg Oral Nightly    pantoprazole  40 mg Oral QAM AC    raltegravir  400 mg Oral BID    abacavir  600 mg Oral Daily      amiodarone 450mg/250ml D5W infusion 0.5 mg/min (12/19/18 0144)    phenylephrine (BRANDEE-SYNEPHRINE) 50mg/250mL infusion 40 mcg/min (12/19/18 0920)    HYDROmorphone      heparin (porcine) 11.9 mL/hr (12/19/18 0955)    dextrose      sodium chloride 75 mL/hr at 12/19/18 0047    dextrose       sodium chloride flush, naloxone, bisacodyl, glucose, dextrose, glucagon (rDNA), dextrose, acetaminophen, sodium chloride, ondansetron, diphenhydrAMINE, glucose, dextrose, glucagon (rDNA), dextrose    Lab Data:  CBC:   Recent Labs      12/17/18   0559  12/18/18   0438  12/19/18   0441   WBC  8.5  16.8*  11.9*   HGB  10.7*  14.2  11.8*   PLT  202  359  282     BMP:    Recent Labs      12/18/18   0059  12/18/18   0438  12/19/18   0441   NA  138  133*  132*   K  5.8*  5.6*  4.5   CO2  22  23  25   BUN  12  22*  29*   CREATININE  1.2  1.4*  1.6*     LIVR:   Recent Labs      12/17/18   0559  12/18/18   0438  12/19/18   0441   AST  18  19  18   ALT  36  28  27     INR:  No results for input(s): INR in the last 72 hours. APTT:   Recent Labs      12/17/18   0559  12/18/18   2214  12/19/18   0710   APTT  64.1*  105.1*  52.4*     BNP:  No results for input(s): BNP in the last 72 hours.     Imaging:Patient appears to be in atrial fibrillation.   Overall left ventricular systolic function appears

## 2018-12-19 NOTE — PROGRESS NOTES
General and Vascular Surgery                                                           Daily Progress Note                                                             Maria E Wliey M.D. Pt Name: Nicole Phipps  Medical Record Number: [id number]  Date of Birth 1960   Today's Date: 12/19/2018    ASSESSMENT  CC: leg pain     Post-op Day #2 fem pop bypass     Subjective:       VT  ON pod#1t, EKG changed, ? MI     sTILL on pressor      1. Pain is : controlled     PLAN  1. See what cardiology thinks MI? Recent cardiac cath at [facility] has improved from yesterday. Pain is well controlled. He has no nausea and no vomiting. He has passed flatus and has had a bowel movement. He is tolerating regular diet. Current activity is up with assistance    OBJECTIVE  VITALS:  height is 6' 2.02\" (1.88 m) and weight is 181 lb 14.1 oz (82.5 kg). His axillary temperature is 97 °F (36.1 °C). His blood pressure is 94/61 and his pulse is 98. His respiration is 12 and oxygen saturation is 96%. VITALS:  BP 94/61   Pulse 98   Temp 97 °F (36.1 °C) (Axillary)   Resp 12   Ht 6' 2.02\" (1.88 m)   Wt 181 lb 14.1 oz (82.5 kg)   SpO2 96%   BMI 23.34 kg/m²   INTAKE/OUTPUT:    Intake/Output Summary (Last 24 hours) at 12/19/18 0739  Last data filed at 12/19/18 0600   Gross per 24 hour   Intake             3811 ml   Output              415 ml   Net             3396 ml     GENERAL: alert, no distress  LUNGS: , normal air movement, no respiratory distress    ABDOMEN: soft, non-tender, non-distended and bowel sounds present in all 4 quadrants  EXTERMITY:  Warm  Good PT Doppler wear AT signal SASKIA vacs in place and working  I/O last 3 completed shifts: In: 1450 [P.O.:840; I.V.:2871; IV Piggyback:100]  Out: 415 [Urine:415]  No intake/output data recorded.     LABS  Recent Labs      12/19/18   0441   WBC  11.9*   HGB  11.8*   HCT  38.1*   PLT  282   NA  132*   K  4.5

## 2018-12-19 NOTE — PROGRESS NOTES
Occupational Therapy   Occupational Therapy  TREATMENT  Date: 2018   Patient Name: Gabino Aguilar  MRN: [id number]     : 1960    Date of Service: 2018    Assessment: Pt is 62 y.o. M who presents with multiple open wounds on LLE. PTA pt lives with roommate in house with 4 SABINO. Pt reports having laundry in basement that roommate has been assisting with. Pt reports completing self-care tasks independently - sponge bathing at sink and has assistance for homemaking responsibilities. Currently, pt is completing  transfers with CGA  and RW- Pt is WBAT L LE s/p fem pop . Pt is  limited by LLE pain as he anticipates movement will be painful . Pt requiring increased assistance for ADLs needs at this time . Anticipate pt safe to d/c home with assistance from roommate and home health OT . Discharge Recommendations:  Gabino Aguilar scored a 21/24 on the AM-PAC ADL Inpatient form. Current research shows that an AM-PAC score of 18 or greater is typically associated with a discharge to the patient's home setting. Based on the patients AM-PAC score and their current ADL deficits, it is recommended that the patient have 2-3 sessions per week of Occupational Therapy at d/c to increase the patients independence  Patient would benefit from continued therapy after discharge, 24 hour supervision or assist, Home with Home health OT  OT Equipment Recommendations  Equipment Needed: Yes  Other: RW as assessed by PT . Pt stated need for  elevated commode set or 3 in one commode - pt aware this is not covered by Ins- suggest he have his partner attmpt to find equipment in thrift store or thru [employer]. Pt ststes there is a transportation problem. Patient Diagnosis(es): The primary encounter diagnosis was Wound infection. Diagnoses of Tachycardia, Elevated troponin, Amphetamine abuse ([facility] [address] and Human immunodeficiency virus (HIV) disease ([facility] [address] were also pertinent to this visit.      has a past medical history of distractable, Tx is time intensive d/t this. Pain Assessment  Patient Currently in Pain: Yes  Pain Assessment: 0-10  Hughes-Baker Pain Rating: Hurts a little bit  Pain Level: 8  Pain Type: Acute pain  Pain Location: Leg  Pain Orientation: Left  Pain Descriptors: Aching  Pain Frequency: Continuous  Clinical Progression: Not changed  Patient's Stated Pain Goal: 2  Effect of Pain on Daily Activities: impaired mobility, irritability  Pain Intervention(s): Medication (see eMar) (PCA pump )  Response to Pain Intervention: None  Oxygen Therapy  SpO2: 99 %  Social/Functional History  Social/Functional History  Lives With:  (roommate)  Type of Home: House  Home Layout: Laundry in basement  Home Access: Stairs to enter without rails  Entrance Stairs - Number of Steps: 4 steps from sidewalk to porch   Bathroom Shower/Tub: Tub/Shower unit  [employer]: Standard  Bathroom Equipment:  (None)  Bathroom Accessibility: Not accessible  Home Equipment:  (none)  ADL Assistance: Independent (sponge bathes at sink, has not showered since June)  Homemaking Assistance: Needs assistance (roommate assist with laundry, cooking and cleaning)  Ambulation Assistance: Independent  Transfer Assistance: Independent  Active : No  Additional Comments: Pt. SW and friends assist with transportation. Pt. denies recent falls. Objective   Vision: Impaired  Vision Exceptions: Wears glasses at all times  Hearing: Within functional limits       Observation/Palpation  Observation: left LE lower leg dressing intact after debridement , 2 wound vacs  over surgical wounds on thigh s/p fem-pop bypass 12/17/18  Standing Balance  Sit to stand: Contact guard assistance  Stand to sit: Contact guard assistance  ADL  Additional Comments: Unable to address ADLs today as pt needed to be back in bed for PICC line.    Assisted pt with covering L forearm as his IV is leaking - RN in process of moving IV lines to patent sites and awaiting PICC line to be placed, Pt's bed remade and area cleaned of blood from IV leaking. SIte bandaged by RN after pt transferred to bed. Bed mobility  Supine to Sit:  (HOB elevated. Slow.  )  Sit to Supine: Contact guard assistance (max VC to reposition self in bed, again distracts  self taking extended time tryin g to get comfortable. Pt still c/o discomfort in grin from the tegaderm tape holding wound vacs. C/O scrotal discomfort  moving but declines to susan to wear an undergarment)  Scooting: Contact guard assistance;Minimal assistance (Max VC and prompts)  Comment: Pt positioned on bed to have PICC line inserted. Ed pt this is a sterile technique and he cannot have his water or anything else from his tray until PICC is completetd   Transfers  Stand Step Transfers: Contact guard assistance  Sit to stand: Contact guard assistance  Stand to sit: Contact guard assistance  Transfer Comments: CGAA x 1 and assist of another for lines- VC for RW use and direction to turn, VC to focus on  functional activity and his participation,     Cognition  Overall Cognitive Status: [facility]  Cognition Comment: redirection to task-very talkative. Questionable insight into medical situation       Assessment   Performance deficits / Impairments: Decreased functional mobility ; Decreased ADL status; Decreased strength;Decreased endurance;Decreased balance;Decreased cognition  Assessment: Pt is 62 y.o. M who presents with multiple open wounds on LLE. PTA pt lives with roommate in house with 4 SABINO. Pt reports having laundry in basement that roommate has been assisting with. Pt reports completing self-care tasks independently - sponge bathing at sink and has assistance for homemaking responsibilities. Currently, pt is completing  transfers with CGA  and RW- Pt is WBAT L LE s/p fem pop 12/17. Pt is  limited by LLE pain as he anticipates movement will be painful . Pt requiring increased assistance for ADLs needs at this time .   Anticipate pt safe to d/c home with

## 2018-12-19 NOTE — PROGRESS NOTES
Nutrition Assessment    Type and Reason for Visit: Reassess    Nutrition Recommendations: Add chocolate Glucerna bid to start    Nutrition Assessment: Pt with improved po intake but remains at risk for nutrition compromise r/t increased nutrient needs r/t surgery, healing of altered skin integrity & presence of HIV. Pt reported that intake a little down this date. Agreed to add chocolate Glucerna as at home. Malnutrition Assessment:  · Malnutrition Status: At risk for malnutrition  · Context: Chronic illness  · Findings of the 6 clinical characteristics of malnutrition (Minimum of 2 out of 6 clinical characteristics is required to make the diagnosis of moderate or severe Protein Calorie Malnutrition based on AND/ASPEN Guidelines):  1. Energy Intake-Less than 75% of estimated energy requirement for greater than 7 days, not able to assess    2. Weight Loss-Unable to assess (Pt is unsure of UBW), unable to assess  3. Fat Loss-No significant subcutaneous fat loss,    4. Muscle Loss-No significant muscle mass loss,    5. Fluid Accumulation-Mild fluid accumulation, Extremities  6.  Strength-Not measured    Nutrition Risk Level: Moderate (r/t wounds)    Nutrient Needs:  · Estimated Daily Total Kcal: 8891-6010 kcal/day (based on 30-35 kcal/kg ABW)   · Estimated Daily Protein (g):  gm (1-1.4 gm protein/kg IBW)  · Estimated Daily Total Fluid (ml/day): 1 ml/1 kcal or per MD order    Nutrition Diagnosis:   · Problem: Increased nutrient needs  · Etiology: related to Increased demand for energy/nutrients     Signs and symptoms:  as evidenced by Presence of wounds    Objective Information:  · Nutrition-Focused Physical Findings: BM 12/18. Pt -6.2 liters. Noted +1 edema to lower extremities.   · Wound Type: Multiple (wounds, scabs & ulcers to BLE)  · Current Nutrition Therapies:  · Oral Diet Orders: Carb Control 5 Carbs/Meal   · Oral Diet intake: %  · Oral Nutrition Supplement (ONS) Orders: None  · ONS

## 2018-12-19 NOTE — PROGRESS NOTES
Today he states that he is feeling better. Less pain in left leg and foot    Still with labile blood pressure but heart rate is slower today. Amiodarone control of A. Fib  Neosynephrine discontinued and increased fluid delivery instituted by Critical Care. IV heparin for protection of clot formation as related to A. Fib    Renal function still distorted. Cr increased to 1.6 but BUN decreased to 44  BLood sugars are better regulated  Low albumin    Left foot is warmer and has better color today. Improved perfusion over yesterday  There is a faintly palpable popliteal pulse but BP is 79 systolic. Agree with institution of IV fluids and discontinuation of Joseluis and BP meds  Re-hydration will hopefully improve renal function and blood pressure. He has multiple reasons for hypovolemia; recent operation, low oncotic pressure from low proteins, fluid losses from open wound on left leg.     Ami Marquez MD

## 2018-12-19 NOTE — PROGRESS NOTES
Physical Therapy  [facility]  Daily Treatment Note  NAME: Zena Jay  : 1960  MRN: [id number]    Date of Service: 2018    Discharge Recommendations:  Continue to assess pending progress   PT Equipment Recommendations  Equipment Needed: Yes  Mobility Devices: Zuleyma Amezquita: [place] Jay scored a 18/24 on the AM-PAC short mobility form. Current research shows that an AM-PAC score of 18 or greater is typically associated with a discharge to the patient's home setting. Based on the patients AM-PAC score and their current functional mobility deficits, it is recommended that the patient have 2-3 sessions per week of Physical Therapy at d/c to increase the patients independence. Patient Diagnosis(es): The primary encounter diagnosis was Wound infection. Diagnoses of Tachycardia, Elevated troponin, Amphetamine abuse ([facility] [address] and Human immunodeficiency virus (HIV) disease ([facility] [address] were also pertinent to this visit. has a past medical history of Anxiety; Chronic CHF (congestive heart failure) ([facility] [address] Diabetes mellitus ([facility] [address] Hep B w/o coma; Hepatitis C without hepatic coma; HIV (human immunodeficiency virus infection) ([facility] [address] Hyperlipidemia; and Hypertension. has a past surgical history that includes incision and drainage (Left, 12/10/2018) and femoral bypass (Left, 2018). Restrictions  Restrictions/Precautions  Restrictions/Precautions: Fall Risk  Position Activity Restriction  Other position/activity restrictions: Wound Vac x 2 L LE; WBAT. Subjective   General  Chart Reviewed: Yes  Additional Pertinent Hx: 63 y/o male admit to hospital 12/10/18 with chronic left LE ulcer, tachycardia. S/P  L fem-pop bypass 18. PMH: CAD, HIV, Hep C, Hep B, DM, H/O Crystal Meth. Family / Caregiver Present: No  Referring Practitioner: Dr. Kimani Malloy  Subjective  Subjective: Pt agreeable to cont PT Rx.    General Comment  Comments: Nursing reports Pressors d/c'd, clear 43.63  Mobility Inpatient CMS 0-100% Score: 46.58  Mobility Inpatient CMS G-Code Modifier : CK          Goals  Short term goals  Time Frame for Short term goals: Upon d/c acute care setting. Short term goal 1: Bed Mob Independent. Short term goal 2: Transfers with/without assist device SBA/Supervision. Short term goal 3: Amb with/without assist device 150' SBA. Short term goal 4: Stair Negotiation as approp. Short term goal 5: Pt. will negotiate 4 stairs with no rail and LRAD with SBA  Patient Goals   Patient goals : \"For left LE to heal and get better - return home when able\"    Plan    Plan  Times per week: 3-5x/week while in acute care setting. Current Treatment Recommendations: Strengthening, Functional Mobility Training, Transfer Training, Gait Training, Stair training, Safety Education & Training, Patient/Caregiver Education & Training  Safety Devices  Type of devices: Call light within reach, Chair alarm in place, Left in chair, Nurse notified (Pt aware to call for assist.  PT spoke with pt's nurse Alanis Nelson). )     Therapy Time   Individual Concurrent Group Co-treatment   Time In 1115         Time Out 1200         Minutes [address] [place] [address] Lázaro Rose

## 2018-12-19 NOTE — OP NOTE
been informed of the risks, benefits, and alternatives to  this procedure by Dr. Winnie Quiñonez as per the notes in the patient's  hospital record. A two-team operative approach was chosen to expedite  the performance of the procedure and minimize time under general  anesthesia. This was particularly relevant due to the patient's   underlying comorbidities especially his ischemic cardiomyopathy. OPERATIVE PROCEDURE:  The patient was brought to the operating room  where he was placed under general endotracheal anesthesia. Ultrasound  visualization of the great saphenous vein was again carried out. It was  noted that the saphenous vein did have edematous tissue surrounding it. The patient's vein and tributaries were marked on the skin level. The  leg was prepped and draped in a sterile fashion including Ioban coverage  of the wound on the left lower extremity. Two operative teams started on the exposure. At the groin level, I  opened the skin and dissected to the common femoral artery below the  inguinal ligament. The common femoral, superficial femoral and deep  femoral arteries were circumferentially isolated. There was evidence of  the Perclose device on the superficial wall of the common femoral artery  as related to the recent angiographic procedure. This was not bleeding. The great saphenous vein was identified at the saphenofemoral junction. The vein had large tributaries proximally and these were ligated with  2-0 silk ligatures and divided. There were two sizeable veins moving  distally down the leg. One of these took a more medial course and one  took a more anterior course. The vein on the anterior side appeared to  be the most useable of these two. The anterior vein was away from the  skin marks, which had been placed preoperatively.   The vein was traced  distally and an additional two incisions were made to identify the vein  as it moved from proximal to distal.    At the above-knee popliteal area, Dr. Óscar Ledesma was leading the operative  exposure of the above-knee popliteal artery. Through his incision, the  saphenous vein was also identified. Working from both below and above,  the vein was isolated and tributaries that were identified were ligated  with silk ligatures. The vein appeared to be of adequate quality to  serve the purpose of the bypass graft. The patient was given a therapeutic dose of heparin. The vein was  ligated at its distal extent of exposure and divided. It was also  divided at the saphenofemoral junction with 3-0 silk suture ligature  placed in the remnant of the saphenous vein on the surface of the common  femoral vein. The great saphenous vein was manipulated on the back table of the  operating room. Heparinized saline irrigation was used to identify  bleeding sites on the vein and these were clipped with Hemoclips or  sutured closed with 6-0 Prolene sutures. Once the integrity of the vein  had been established, it was ready to be placed as a bypass graft in a  reversed configuration. The popliteal artery was occluded with vascular clamps and an  arteriotomy was made in a soft portion of the artery. The saphenous  vein was anastomosed to the popliteal artery with running 6-0 Prolene  suture in a parachute technique. During the course of this suturing  process, the valves of the vein were sutured to assure that they would  not have any ability to obstruct blood flow. Following completion of the popliteal anastomosis, the vein was tunneled  deep to the sartorius muscle using a hollow tunneling device (Chio). The vein was brought to the common femoral artery and the anastomosis to  the common femoral artery was accomplished. Hemostasis and the wound closure were carried out as dictated by Dr. Selena Rebolledo in  his operative note.         Joyce Malin MD    D: 12/18/2018 9:53:48       T: 12/18/2018 13:20:43     [email or web address]  Job#: [id number]     Doc#: [id number]    CC:  Sara Arana MD

## 2018-12-19 NOTE — PROGRESS NOTES
Occupational Therapy/Cancel      Renato Sethi    Chart reviewed. Conferred w/ RN Carine Park)  after pt seen by  Dr Sarah Jang . RN reports pt's pressors are to be D/C and he is to receive a bolus of fluids. RN prefers OT /PT wait until pt pressors stopped and pt receives fluids to get him up . RN informed pt tolerated getting OOB with out a problem yesterday even with his lower  BP . Will return later today . Kailee Cote OT will continue to follow.   Ken Johnson OT/L  # [phone]  12/19/18 11:03 AM

## 2018-12-19 NOTE — PROGRESS NOTES
(MYCOSTATIN) 487232 UNIT/GM cream APPLY TOPICALLY TWICE A DAY      pantoprazole (PROTONIX) 40 MG tablet Take 40 mg by mouth daily       promethazine (PHENERGAN) 25 MG tablet TAKE 1 TABLET BY MOUTH TWICE A DAY AS NEEDED FOR NAUSEA.  Simethicone 125 MG CAPS Take 1 tablet by mouth      traMADol (ULTRAM) 50 MG tablet Take 50 mg by mouth every 6 hours as needed. Kurtis November zolpidem (AMBIEN) 10 MG tablet Take 10 mg by mouth nightly as needed. .         Allergies:  Patient has no known allergies. Immunizations :   Immunization History   Administered Date(s) Administered    Influenza, Quadv, 6 mo and older, IM, PF (Flulaval, Fluarix) 12/11/2018       Social History:    Social History   Substance Use Topics    Smoking status: Never Smoker    Smokeless tobacco: Never Used    Alcohol use No     History   Smoking Status    Never Smoker   Smokeless Tobacco    Never Used      Family History : no DVT no COPD       REVIEW OF SYSTEMS:    No fever / chills / sweats. No weight loss. No visual change, eye pain, eye discharge. No oral lesion, sore throat, dysphagia. Denies cough / sputum/Sob   Denies chest pain, palpitations/ dizziness  Denies nausea/ vomiting/abdominal pain/diarrhea. Denies dysuria or change in urinary function. Denies joint swelling or pain. No myalgia, arthralgia. No rashes, skin lesions   Denies focal weakness, sensory change or other neurologic symptoms  No lymph node swelling or tenderness.     Left leg pain and swelling +  And Left groin pain+   PHYSICAL EXAM:      Vitals:    /63   Pulse 97   Temp 97.4 °F (36.3 °C) (Oral)   Resp 9   Ht 6' 2.02\" (1.88 m)   Wt 181 lb 14.1 oz (82.5 kg)   SpO2 95%   BMI 23.34 kg/m²   General Appearance: alert,in no acute distress, +  pallor, no icterus chronically ill appearing man + poor dentition on nasal cannula    Skin: warm and dry, no rash or erythema  Head: normocephalic and atraumatic  Eyes: pupils equal, round, and reactive to light, conjunctivae normal  ENT: tympanic membrane, external ear and ear canal normal bilaterally, nose without deformity, nasal mucosa and turbinates normal without polyps  Neck: supple and non-tender without mass, no thyromegaly  no cervical lymphadenopathy  Pulmonary/Chest: clear to auscultation bilaterally- no wheezes, rales or rhonchi, normal air movement, no respiratory distress  Cardiovascular: A fib S1 and S2, no murmurs, rubs, clicks, or gallops, no carotid bruits  Abdomen: soft, non-tender, non-distended, normal bowel sounds, no masses or organomegaly  Extremities: no cyanosis, clubbing or + edema  Musculoskeletal: normal range of motion, no joint swelling, deformity or tenderness  Neurologic: reflexes normal and symmetric, no cranial nerve deficit  Psych:  Orientation, sensorium, mood normal  Lines:  PICC  Left leg  dressing+ skin  changes from PVD+  Rt leg punched out ulcers on the leg tibial area  healing slowly and edema going down    Left groin dressing+      Data Review:    Lab Results   Component Value Date    WBC 11.9 (H) 12/19/2018    HGB 11.8 (L) 12/19/2018    HCT 38.1 (L) 12/19/2018    MCV 83.3 12/19/2018     12/19/2018     Lab Results   Component Value Date    CREATININE 1.6 (H) 12/19/2018    BUN 29 (H) 12/19/2018     (L) 12/19/2018    K 4.5 12/19/2018    CL 95 (L) 12/19/2018    CO2 25 12/19/2018       Hepatic Function Panel:   Lab Results   Component Value Date    ALKPHOS 72 12/19/2018    ALT 27 12/19/2018    AST 18 12/19/2018    PROT 6.2 12/19/2018    BILITOT 0.6 12/19/2018    BILIDIR 0.4 12/19/2018    IBILI 0.2 12/19/2018    LABALBU 2.3 12/19/2018       UA:  Lab Results   Component Value Date    COLORU DK YELLOW 12/10/2018    CLARITYU Clear 12/10/2018    GLUCOSEU Negative 12/10/2018    BILIRUBINUR SMALL 12/10/2018    KETUA Negative 12/10/2018    SPECGRAV 1.023 12/10/2018    BLOODU MODERATE 12/10/2018    PHUR 6.0 12/10/2018    PROTEINU 100 12/10/2018    UROBILINOGEN 2.0 12/10/2018

## 2018-12-20 LAB
ALBUMIN SERPL-MCNC: 2.3 G/DL (ref 3.4–5)
ALP BLD-CCNC: 69 U/L (ref 40–129)
ALT SERPL-CCNC: 21 U/L (ref 10–40)
ANION GAP SERPL CALCULATED.3IONS-SCNC: 10 MMOL/L (ref 3–16)
APTT: 56.6 SEC (ref 26–36)
AST SERPL-CCNC: 21 U/L (ref 15–37)
BASOPHILS ABSOLUTE: 0 K/UL (ref 0–0.2)
BASOPHILS RELATIVE PERCENT: 0.5 %
BILIRUB SERPL-MCNC: 0.5 MG/DL (ref 0–1)
BILIRUBIN DIRECT: <0.2 MG/DL (ref 0–0.3)
BILIRUBIN, INDIRECT: ABNORMAL MG/DL (ref 0–1)
BUN BLDV-MCNC: 22 MG/DL (ref 7–20)
CALCIUM SERPL-MCNC: 8 MG/DL (ref 8.3–10.6)
CHLORIDE BLD-SCNC: 100 MMOL/L (ref 99–110)
CO2: 25 MMOL/L (ref 21–32)
CREAT SERPL-MCNC: 1.2 MG/DL (ref 0.9–1.3)
EOSINOPHILS ABSOLUTE: 0.1 K/UL (ref 0–0.6)
EOSINOPHILS RELATIVE PERCENT: 0.7 %
GFR AFRICAN AMERICAN: >60
GFR NON-AFRICAN AMERICAN: >60
GLUCOSE BLD-MCNC: 106 MG/DL (ref 70–99)
GLUCOSE BLD-MCNC: 125 MG/DL (ref 70–99)
GLUCOSE BLD-MCNC: 126 MG/DL (ref 70–99)
GLUCOSE BLD-MCNC: 159 MG/DL (ref 70–99)
GLUCOSE BLD-MCNC: 76 MG/DL (ref 70–99)
HCT VFR BLD CALC: 32.5 % (ref 40.5–52.5)
HEMOGLOBIN: 10.3 G/DL (ref 13.5–17.5)
LYMPHOCYTES ABSOLUTE: 1.4 K/UL (ref 1–5.1)
LYMPHOCYTES RELATIVE PERCENT: 16.3 %
MAGNESIUM: 2.1 MG/DL (ref 1.8–2.4)
MCH RBC QN AUTO: 26.4 PG (ref 26–34)
MCHC RBC AUTO-ENTMCNC: 31.6 G/DL (ref 31–36)
MCV RBC AUTO: 83.4 FL (ref 80–100)
MONOCYTES ABSOLUTE: 0.6 K/UL (ref 0–1.3)
MONOCYTES RELATIVE PERCENT: 6.7 %
NEUTROPHILS ABSOLUTE: 6.7 K/UL (ref 1.7–7.7)
NEUTROPHILS RELATIVE PERCENT: 75.8 %
PDW BLD-RTO: 21.3 % (ref 12.4–15.4)
PERFORMED ON: ABNORMAL
PERFORMED ON: NORMAL
PHOSPHORUS: 3 MG/DL (ref 2.5–4.9)
PLATELET # BLD: 186 K/UL (ref 135–450)
PMV BLD AUTO: 7.5 FL (ref 5–10.5)
POTASSIUM REFLEX MAGNESIUM: 5 MMOL/L (ref 3.5–5.1)
RBC # BLD: 3.9 M/UL (ref 4.2–5.9)
SODIUM BLD-SCNC: 135 MMOL/L (ref 136–145)
TOTAL PROTEIN: 6 G/DL (ref 6.4–8.2)
WBC # BLD: 8.8 K/UL (ref 4–11)

## 2018-12-20 PROCEDURE — 6370000000 HC RX 637 (ALT 250 FOR IP): Performed by: SURGERY

## 2018-12-20 PROCEDURE — APPNB30 APP NON BILLABLE TIME 0-30 MINS: Performed by: NURSE PRACTITIONER

## 2018-12-20 PROCEDURE — 83735 ASSAY OF MAGNESIUM: CPT

## 2018-12-20 PROCEDURE — 84100 ASSAY OF PHOSPHORUS: CPT

## 2018-12-20 PROCEDURE — 2580000003 HC RX 258: Performed by: NURSE PRACTITIONER

## 2018-12-20 PROCEDURE — 6370000000 HC RX 637 (ALT 250 FOR IP): Performed by: INTERNAL MEDICINE

## 2018-12-20 PROCEDURE — 6360000002 HC RX W HCPCS: Performed by: HOSPITALIST

## 2018-12-20 PROCEDURE — 97530 THERAPEUTIC ACTIVITIES: CPT

## 2018-12-20 PROCEDURE — 2580000003 HC RX 258: Performed by: HOSPITALIST

## 2018-12-20 PROCEDURE — 85730 THROMBOPLASTIN TIME PARTIAL: CPT

## 2018-12-20 PROCEDURE — 6370000000 HC RX 637 (ALT 250 FOR IP): Performed by: HOSPITALIST

## 2018-12-20 PROCEDURE — APPSS30 APP SPLIT SHARED TIME 16-30 MINUTES: Performed by: NURSE PRACTITIONER

## 2018-12-20 PROCEDURE — 97116 GAIT TRAINING THERAPY: CPT

## 2018-12-20 PROCEDURE — 94762 N-INVAS EAR/PLS OXIMTRY CONT: CPT

## 2018-12-20 PROCEDURE — 97110 THERAPEUTIC EXERCISES: CPT

## 2018-12-20 PROCEDURE — 97535 SELF CARE MNGMENT TRAINING: CPT

## 2018-12-20 PROCEDURE — 85025 COMPLETE CBC W/AUTO DIFF WBC: CPT

## 2018-12-20 PROCEDURE — 99233 SBSQ HOSP IP/OBS HIGH 50: CPT | Performed by: INTERNAL MEDICINE

## 2018-12-20 PROCEDURE — 80048 BASIC METABOLIC PNL TOTAL CA: CPT

## 2018-12-20 PROCEDURE — 80076 HEPATIC FUNCTION PANEL: CPT

## 2018-12-20 PROCEDURE — 6360000002 HC RX W HCPCS: Performed by: INTERNAL MEDICINE

## 2018-12-20 PROCEDURE — 1200000000 HC SEMI PRIVATE

## 2018-12-20 PROCEDURE — 6360000002 HC RX W HCPCS: Performed by: NURSE PRACTITIONER

## 2018-12-20 RX ORDER — EMTRICITABINE AND TENOFOVIR DISOPROXIL FUMARATE 200; 300 MG/1; MG/1
1 TABLET, FILM COATED ORAL NIGHTLY
Status: DISCONTINUED | OUTPATIENT
Start: 2018-12-20 | End: 2018-12-27 | Stop reason: HOSPADM

## 2018-12-20 RX ADMIN — LAMIVUDINE 150 MG: 150 TABLET, FILM COATED ORAL at 08:45

## 2018-12-20 RX ADMIN — Medication 10 ML: at 08:37

## 2018-12-20 RX ADMIN — RALTEGRAVIR 400 MG: 400 TABLET, FILM COATED ORAL at 08:45

## 2018-12-20 RX ADMIN — CALCIUM 500 MG: 500 TABLET ORAL at 23:26

## 2018-12-20 RX ADMIN — CALCIUM 500 MG: 500 TABLET ORAL at 08:37

## 2018-12-20 RX ADMIN — ATORVASTATIN CALCIUM 80 MG: 80 TABLET, FILM COATED ORAL at 08:36

## 2018-12-20 RX ADMIN — PIPERACILLIN SODIUM,TAZOBACTAM SODIUM 3.38 G: 3; .375 INJECTION, POWDER, FOR SOLUTION INTRAVENOUS at 06:01

## 2018-12-20 RX ADMIN — EMTRICITABINE AND TENOFOVIR DISOPROXIL FUMARATE 1 TABLET: 200; 300 TABLET, FILM COATED ORAL at 23:25

## 2018-12-20 RX ADMIN — MONTELUKAST SODIUM 10 MG: 10 TABLET, COATED ORAL at 23:26

## 2018-12-20 RX ADMIN — INSULIN LISPRO 5 UNITS: 100 INJECTION, SOLUTION INTRAVENOUS; SUBCUTANEOUS at 11:57

## 2018-12-20 RX ADMIN — DIGOXIN 125 MCG: 125 TABLET ORAL at 08:36

## 2018-12-20 RX ADMIN — Medication 0.2 MG: at 04:42

## 2018-12-20 RX ADMIN — HEPARIN SODIUM 11.9 ML/HR: 10000 INJECTION, SOLUTION INTRAVENOUS at 11:48

## 2018-12-20 RX ADMIN — SODIUM CHLORIDE: 9 INJECTION, SOLUTION INTRAVENOUS at 13:29

## 2018-12-20 RX ADMIN — INSULIN LISPRO 3 UNITS: 100 INJECTION, SOLUTION INTRAVENOUS; SUBCUTANEOUS at 11:54

## 2018-12-20 RX ADMIN — ABACAVIR 600 MG: 300 TABLET, FILM COATED ORAL at 08:45

## 2018-12-20 RX ADMIN — INSULIN LISPRO 5 UNITS: 100 INJECTION, SOLUTION INTRAVENOUS; SUBCUTANEOUS at 17:27

## 2018-12-20 RX ADMIN — VITAMIN D, TAB 1000IU (100/BT) 1000 UNITS: 25 TAB at 08:37

## 2018-12-20 RX ADMIN — SODIUM CHLORIDE: 9 INJECTION, SOLUTION INTRAVENOUS at 03:51

## 2018-12-20 RX ADMIN — PANTOPRAZOLE SODIUM 40 MG: 40 TABLET, DELAYED RELEASE ORAL at 06:01

## 2018-12-20 RX ADMIN — PIPERACILLIN SODIUM,TAZOBACTAM SODIUM 3.38 G: 3; .375 INJECTION, POWDER, FOR SOLUTION INTRAVENOUS at 14:14

## 2018-12-20 RX ADMIN — DOLUTEGRAVIR SODIUM 50 MG: 50 TABLET, FILM COATED ORAL at 11:54

## 2018-12-20 RX ADMIN — PIPERACILLIN SODIUM,TAZOBACTAM SODIUM 3.38 G: 3; .375 INJECTION, POWDER, FOR SOLUTION INTRAVENOUS at 23:26

## 2018-12-20 RX ADMIN — Medication 10 ML: at 23:35

## 2018-12-20 RX ADMIN — ASPIRIN 81 MG 81 MG: 81 TABLET ORAL at 08:37

## 2018-12-20 ASSESSMENT — PAIN SCALES - GENERAL
PAINLEVEL_OUTOF10: 0
PAINLEVEL_OUTOF10: 8

## 2018-12-20 NOTE — PROGRESS NOTES
Piggyback:150]  Out: 1600 [Urine:900; Emesis/NG output:700]  I/O this shift:  In: -   Out: 200 [Urine:200]    LABS  Recent Labs      12/20/18   0520   WBC  8.8   HGB  10.3*   HCT  32.5*   PLT  186   NA  135*   K  5.0   CL  100   CO2  25   BUN  22*   CREATININE  1.2   MG  2.10   PHOS  3.0   CALCIUM  8.0*   AST  21   ALT  21   BILITOT  0.5   BILIDIR  <0.2     CBC:   Lab Results   Component Value Date    WBC 8.8 12/20/2018    RBC 3.90 12/20/2018    HGB 10.3 12/20/2018    HCT 32.5 12/20/2018    MCV 83.4 12/20/2018    MCH 26.4 12/20/2018    MCHC 31.6 12/20/2018    RDW 21.3 12/20/2018     12/20/2018    MPV 7.5 12/20/2018     BMP:    Lab Results   Component Value Date     12/20/2018    K 5.0 12/20/2018     12/20/2018    CO2 25 12/20/2018    BUN 22 12/20/2018    LABALBU 2.3 12/20/2018    CREATININE 1.2 12/20/2018    CALCIUM 8.0 12/20/2018    GFRAA >60 12/20/2018    LABGLOM >60 12/20/2018    GLUCOSE 106 12/20/2018     PTT:    Lab Results   Component Value Date    APTT 56.6 12/20/2018   [APTT}      Maricruz Perdomova  Electronically signed 12/20/2018 at 9:03 AM  Pt seen and examined. Agree with Abraham Pi, APRN note. Labs reviewed.   Irrigating VAC placed yesterday PIC O VAC doing well on leg wounds off pressors possibly move out of the unit

## 2018-12-20 NOTE — PROGRESS NOTES
Units Subcutaneous Nightly    digoxin  125 mcg Oral Daily    calcium elemental  500 mg Oral BID    vitamin D  1,000 Units Oral Daily    atorvastatin  80 mg Oral Daily    aspirin  81 mg Oral Daily    montelukast  10 mg Oral Nightly    pantoprazole  40 mg Oral QAM AC      HYDROmorphone      heparin (porcine) 11.9 mL/hr (12/20/18 1148)    dextrose      sodium chloride 100 mL/hr at 12/20/18 1329     sodium chloride flush, naloxone, bisacodyl, glucose, dextrose, glucagon (rDNA), dextrose, acetaminophen, diphenhydrAMINE    Lab Data:  CBC:   Recent Labs      12/18/18   0438  12/19/18   0441  12/20/18   0520   WBC  16.8*  11.9*  8.8   HGB  14.2  11.8*  10.3*   PLT  359  282  186     BMP:    Recent Labs      12/18/18   0438  12/19/18   0441  12/20/18   0520   NA  133*  132*  135*   K  5.6*  4.5  5.0   CO2  23  25  25   BUN  22*  29*  22*   CREATININE  1.4*  1.6*  1.2     LIVR:   Recent Labs      12/18/18   0438  12/19/18   0441  12/20/18   0520   AST  19  18  21   ALT  28  27  21     INR:  No results for input(s): INR in the last 72 hours. APTT:   Recent Labs      12/19/18   1530  12/19/18   2107  12/20/18   0520   APTT  59.1*  59.6*  56.6*     BNP:  No results for input(s): BNP in the last 72 hours. Imaging:    Patient appears to be in atrial fibrillation.   Overall left ventricular systolic function appears severely reduced.   Ejection fraction is visually estimated to be 20% with diffuse hypokinesis   and minor regional variations. Normal left ventricular wall thickness and   cavity size. Spontaneous echo contrast noted in the left ventricle.  Possible   small apical mural thrombus.   The right ventricle appears mildly dilated with severely reduced systolic   function.   The left atrium is severely dilated.   The right atrium is moderately dilated.   Trivial mitral regurgitation.   Mild tricuspid regurgitation.   Small pericardial effusion without tamponade physiology.   Possible ASD/PFO based on color Doppler.  IVC size is dilated (>2.1 cm) and collapses < 50% with respiration   consistent with elevated RA pressure (15 mmHg).  Estimated pulmonary artery systolic pressure is mildly elevated at 40 mmHg   assuming a right atrial pressure of 15 mmHg.       Assessment/Plan:      Atrial fibrillation/atrial flutter  Off amiodarone drip  Remains in controlled A. fib  On heparin; oral anticoagulation being considered    Cardiomyopathy   ECHO shows severe LV dysfunction ( ischemic)   entresto  Toprol XL and aldactone  Are on hold due to hypotension NYHA III    Hypotension  Mutifactorial(meds, arrthymias,low CO)    Non healing ulcer  Pt  undewent peripheral angio on 12/13/18 & had stents placed by Dr Ivelisse Sanchez.  .  he will be undergoing Lt fem/pop on 12/17/18        Electronically signed by Presley Tobin MD on 12/20/2018 at 5:30 PM

## 2018-12-20 NOTE — PROGRESS NOTES
Improving. He feels better by his report  No nausea or vomiting today like he had yesterday  Appetite better and he is eating lunch    V.S. Better with improved heart rate and slow elevation of his blood pressure    Left foot and lower leg much better perfused. Beginning to develop re-vascularization edema of lower leg and foot    Improved renal function; urine output and falling BUN/Cr. WBC back into the normal range    Wound care note and photos noted    Patient encouraged to eat to provide proteins and calories for wound healing.     Duglas Brewer MD

## 2018-12-20 NOTE — PROGRESS NOTES
AMYLASE in the last 72 hours. No results for input(s): LIPASE in the last 72 hours. UA:No results for input(s): NITRITE, LABCAST, WBCUA, RBCUA, MUCUS in the last 72 hours. TROPONIN:   Recent Labs      12/19/18   0120  12/19/18   0710  12/19/18   1530   TROPONINI  0.03*  0.02*  0.02*       Lab Results   Component Value Date/Time    URRFLXCULT Yes 12/10/2018 01:57 AM       No results for input(s): TSHREFLEX in the last 72 hours. No components found for: [id number]  POC GLUCOSE:    Recent Labs      12/19/18   1136  12/19/18   1652  12/19/18   2039  12/20/18   0726  12/20/18   1144   POCGLU  154*  191*  168*  125*  159*     No results for input(s): LABA1C in the last 72 hours. Lab Results   Component Value Date    LABA1C 8.7 12/11/2018     Ejection fraction is visually estimated to be 20%    ASSESSMENT AND PLAN     Chronic Left Lower Limb Infection,   with ulcers, deep, necrotic,  post op day 7, S/P angiogram and stents placement. Postop day #3 for left femoral above-knee popliteal bypass  graft with great saphenous vein  Continues to complain of pain  Vascular is following    Ventricular tachycardia on postop day 1  Cardiology is following  Patient Has been weaned off pressors     A-flutter with RVR  Heart rate is controlled  On anticoagulation  Coronary artery disease  Patient had an episode of ventricular tachycardia  Currently on amiodarone a    Chronic Systolic CHF,  EF 75%,   Hold Lasix for low blood pressure  Gentle IV fluids      DM2  Insulin Lantus prandial and sliding scale  Continue current doses    HIV,   continue home meds  ID is following    Acute renal failure  Likely due to hypotension  Patient on IV fluids    Code Status: Full Code        Dispo - Continue care        The patient and / or the family were informed of the results of any tests, a time was given to answer questions, a plan was proposed and they agreed with plan.     Estrellita Clark MD

## 2018-12-20 NOTE — PROGRESS NOTES
0845: BP 90/53. Metoprolol held  0850: Dr. Freddy Grady @ bedside. [id number] Patrica Mccollum for patient to move out of ICU from their standpoint  0900: Pt up to chair per PT  1415: Ok per Dr. Ruben Nj for patient to be PCU status   1730: Pt remains in chair. VSS. Call light within reach. Will monitor. 1904: Pt to transfer to 21 . Portable tele placed on patient. Belongings packed.  Nightshift, RN to call report  Electronically signed by Sohan Simmons RN on 12/20/2018 at 7:05 PM

## 2018-12-20 NOTE — PROGRESS NOTES
Inability to obtain full foot flat wgt bear L LE. No specific LOB noted. Limited endurance. Exercises  Hip Flexion: 10x B LEs. Knee Long Arc Quad: 10x B LEs. Ankle Pumps: 20x  Comments: Very gentle gastroc stretch L LE 5x / 10 sec hold each. Assessment   Body structures, Functions, Activity limitations: Decreased functional mobility ; Decreased strength;Decreased endurance  Assessment: Pt s/p L fem-pop bypass 12/17/18. Today, improved kaitlin OOB activities with Walker and assist x 1. Pt cont to report roommate will be able to provide adequate assist/support upon d/c although now with wound vac x 3 (additional wound vac system over L lat calf region). Needs to be able to carryout stair negotiation for d/c home; skeptical of this at this time. Will need to monitor pt's progress over next few days. Prognosis: Fair  Patient Education: Role of PT, POC, Need to call for assist, Safe use of Walker, LE Exs. REQUIRES PT FOLLOW UP: Yes  Activity Tolerance  Activity Tolerance: Patient Tolerated treatment well  Activity Tolerance: Pt kaitlin OOB activities to chair with Walker, appears improved pain levels/kaitlin. Difficulty with ex/functional activities; reports \"tightness\" assoc with dressing/wound vacs over L LE. Plan   Plan  Times per week: 3-5x/week while in acute care setting.    Current Treatment Recommendations: Strengthening, Functional Mobility Training, Transfer Training, Gait Training, Stair training, Safety Education & Training, Patient/Caregiver Education & Training  Safety Devices  Type of devices: Call light within reach, Chair alarm in place, Left in chair, Nurse notified (Pt aware to call for assist.  PT spoke with pt's nurse Dina Damico).)    G-Code     OutComes Score                                                  AM-PAC Score  AM-PAC Inpatient Mobility Raw Score : 18  AM-PAC Inpatient T-Scale Score : 43.63  Mobility Inpatient CMS 0-100% Score: 46.58  Mobility Inpatient CMS G-Code Modifier : CK          Goals  Short term goals  Time Frame for Short term goals: Upon d/c acute care setting. Short term goal 1: Bed Mob Independent. Short term goal 2: Transfers with/without assist device SBA/Supervision. Short term goal 3: Amb with/without assist device 150' SBA. Short term goal 4: Stair Negotiation as approp. Short term goal 5: Pt. will negotiate 4 stairs with no rail and LRAD with SBA  Patient Goals   Patient goals :  \"For left LE to heal and get better - return home when able\"       Therapy Time   Individual Concurrent Group Co-treatment   Time In 0845         Time Out 0945         Minutes [facility], PT

## 2018-12-20 NOTE — PROGRESS NOTES
Infectious Disease Follow up Notes  Admit Date: 12/9/2018  Hospital Day: 12    Antibiotics : iv ZOSYN   HAART     CHIEF COMPLAINT:    Left leg infection  Necrotic wound  Cellulitis  PVD  HIV   Osteomyelitis     Subjective interval History :  62 y. o. MAN with DM+ and HIV, Hep C+ Hep B immune through infection followed at [facility] for his medical care and followed by ID service  for his HIV on therapy admitted with worsening Left leg infection with malodorous drainage and severe pain from the worsening infection he has seen Vascular surgeon at [facility] and declined admission and surgery there and given the progression admitted here for evaluation. Seen by Armando Cochran and going to OR today for on going infection and pain. He is on HAART and CD4 counts recently > 700 and HIV virus suppressed, he has CAD and CHF.      Remains in ICU off pressor support remains on nasal cannula no chills no fevers making urine ok creat trend down has on going LEFT groin pain and leg leg pain has wound vac in place and local redness, we discussed about change in HAART and will start new regimen today as creat is better    Past Medical History:    Past Medical History:   Diagnosis Date    Anxiety     Chronic CHF (congestive heart failure) (HCC)     Diabetes mellitus (HCC)     Hep B w/o coma     Hepatitis C without hepatic coma     HIV (human immunodeficiency virus infection) ([facility] [address]     Hyperlipidemia     Hypertension        Past Surgical History:    Past Surgical History:   Procedure Laterality Date    FEMORAL BYPASS Left 12/17/2018    LEFT LEG FEMORAL ABOVE KNEE POPLITEAL BYPASS, REVERSE GREAT Stafenus VEIN performed by Maria E Wiley MD at [address] [place] [address] Left 12/10/2018    INCISIONAL DEBRIDEMENT INCLUDING MUSCLE AND SKIN SUBCUTANEOUS performed by Maria E Wiley MD at [facility]       Current Medications:    Outpatient Prescriptions Marked as Taking for the 12/9/18 encounter [facility] Encounter)   Medication Sig Dispense Refill    furosemide (LASIX) 40 MG tablet Take 40 mg by mouth 2 times daily      potassium chloride (KLOR-CON M) 20 MEQ extended release tablet Take 20 mEq by mouth 2 times daily      calcium carbonate (OYSTER SHELL CALCIUM 500 MG) 1250 (500 Ca) MG tablet Take 1 tablet by mouth 2 times daily      vitamin D (CHOLECALCIFEROL) 1000 units TABS tablet Take 1,000 Units by mouth daily      abacavir-lamivudine (EPZICOM) 600-300 MG per tablet Take 1 tablet by mouth daily       alendronate (FOSAMAX) 70 MG tablet TAKE 1 TABLET BY MOUTH ONCE WEEKLY BEFORE BREAKFAST, ON AN EMPTY STOMACH: REMAIN UPRIGHT FOR 30 MINUTES      ARIPiprazole (ABILIFY) 2 MG tablet Take 2 mg by mouth nightly       aspirin 81 MG chewable tablet Take 81 mg by mouth daily       atorvastatin (LIPITOR) 80 MG tablet TAKE 1 TABLET BY MOUTH DAILY      clonazePAM (KLONOPIN) 1 MG tablet Take 1 mg by mouth 3 times daily as needed. .      fluticasone (FLONASE) 50 MCG/ACT nasal spray PLACE ONE SPRAY IN EACH NOSTRIL ONCE DAILY      insulin aspart (NOVOLOG) 100 UNIT/ML injection pen inject up 4 units before meals with a 1 unit per 50 mg/dL >150 mg/dL.   Max daily dose 50 units      insulin glargine (LANTUS) 100 UNIT/ML injection vial Inject 12 Units into the skin daily       raltegravir (ISENTRESS) 400 MG tablet TAKE 1 TABLET BY MOUTH TWICE A DAY      lidocaine (XYLOCAINE) 5 % ointment Apply topically      lisinopril (PRINIVIL;ZESTRIL) 40 MG tablet Take 40 mg by mouth      loperamide (IMODIUM) 2 MG capsule TAKE 1 CAPSULE BY MOUTH 4 TIMES A DAY AS NEEDED FOR DIARRHEA      metFORMIN (GLUCOPHAGE) 500 MG tablet Take 500 mg by mouth 2 times daily (with meals)       metoprolol (LOPRESSOR) 100 MG tablet Take 100 mg by mouth      mirtazapine (REMERON) 45 MG tablet Take 45 mg by mouth nightly       mometasone (ASMANEX) 220 MCG/INH inhaler Inhale 2 puffs into the lungs 12/10/2018    PHUR 6.0 12/10/2018    PROTEINU 100 12/10/2018    UROBILINOGEN 2.0 12/10/2018    NITRU Negative 12/10/2018    LEUKOCYTESUR TRACE 12/10/2018    LABMICR YES 12/10/2018    URINETYPE Not Specified 12/10/2018      Urine Microscopic:   Lab Results   Component Value Date    COMU see below 12/10/2018    WBCUA 0-2 12/10/2018    RBCUA 5-10 12/10/2018    EPIU 0-2 12/10/2018     BNP  : [id number]     MICRO: cultures reviewed and updated by me   Procedure Component Value Units Date/Time   Tissue Culture [[id number]] (Abnormal) Collected: 12/10/18 1556   Order Status: Completed Specimen: Tissue from Tissue Updated: 12/11/18 1430    Gram Stain Result 3+ Gram negative rods   4+ Gram positive cocci   No WBC's seen    (A)    Organism Gram negative neil (A)    WOUND/ABSCESS --    Moderate growth   ID and sensitivity to follow     Organism Gram negative neil (A)    WOUND/ABSCESS --    Moderate growth   ID and sensitivity to follow   Second colony type    Narrative:     ORDER#: [id number]                          ORDERED BY: Jaswinder Paz  SOURCE: Tissue                             COLLECTED:  12/10/18 15:56  ANTIBIOTICS AT SERGEI. :                      RECEIVED :  12/10/18 16:50  Performed at:  [address]   Phone [phone]   Wound Culture [[id number]] (Abnormal) Collected: 12/10/18 0900   Order Status: Completed Specimen: Leg from Leg Updated: 12/11/18 1253    WOUND/ABSCESS --    Gram Stain Result No WBCs or organisms seen    Organism Gram negative neil (A)    WOUND/ABSCESS --    Light growth   ID and sensitivity to follow     Organism Gram negative neil (A)    WOUND/ABSCESS --    Light growth   ID and sensitivity to follow   Second colony type    Narrative:     ORDER#: [id number]                          ORDERED BY: Tsering Jean Baptiste  SOURCE: Leg Tendon Left                    COLLECTED:  12/10/18 09:00  ANTIBIOTICS AT SERGEI. :                      RECEIVED : Oral Daily    montelukast  10 mg Oral Nightly    pantoprazole  40 mg Oral QAM AC    raltegravir  400 mg Oral BID    abacavir  600 mg Oral Daily       Continuous Infusions:   HYDROmorphone      heparin (porcine) 11.9 mL/hr (12/19/18 1227)    dextrose      sodium chloride 100 mL/hr at 12/20/18 0351       PRN Meds:  sodium chloride flush, naloxone, bisacodyl, glucose, dextrose, glucagon (rDNA), dextrose, acetaminophen, diphenhydrAMINE      Assessment:   Left leg necrotic wound infection   Odor and drainage and mixed infectious process on admit   Left leg some concern for Vascular process  CAD with h/o MI  Medical non compliance per  records   Suspect mixed infectious process  Concern for osteomyelitis  HIV on therapy   Diabetic leg infection    Per  records CD4 check last > 700 and he says he is complaint with HIV therapy    GALI      Given the depth of the infection and duration concern for extension down to the bone and s/p OR for debridement and Cx GNR mixed process -Polymicrobial infection    Risk for limb loss high and he understand  willing to comply with recommendations and medical care      S/p Vascular intervention and revascularization femoropopliteal bypass grafting on Left leg  on 12/17      He has some worsening of creat and he does have cardiomyopathy and has Irais Marrow BP for a while and has on going trop leak is concerning    Creat now better and will change his HAART therapy and discussed about the regimen    Has wound vac in place and anticipate x long course of IV abx and needs to treat like osteomyelitis given the bone at the base of the wound      Labs, Microbiology, Radiology and all the pertinent results from current hospitalization and  care every where were reviewed  by me as a part of the evaluation   Plan: 1. Creat now trend down   2. Cont local care  - wound vac placed   3. Cont IV Zosyn for mixed infectious process dose reduced to x Q 8 HRS FOR NOW as creat is better   4.

## 2018-12-20 NOTE — PROGRESS NOTES
Human immunodeficiency virus (HIV) disease ([facility] [address] were also pertinent to this visit. has a past medical history of Anxiety; Chronic CHF (congestive heart failure) ([facility] [address] Diabetes mellitus ([facility] [address] Hep B w/o coma; Hepatitis C without hepatic coma; HIV (human immunodeficiency virus infection) ([facility] [address] Hyperlipidemia; and Hypertension. has a past surgical history that includes incision and drainage (Left, 12/10/2018) and femoral bypass (Left, 12/17/2018). Treatment Diagnosis: Decreased functional mobility ; Decreased ADL status; Decreased strength;Decreased endurance;Decreased balance; Restrictions  Restrictions/Precautions  Restrictions/Precautions: Fall Risk  Position Activity Restriction  Other position/activity restrictions: Wound Vac x 2 (upper med thigh), Verflo Wound Vac (applied 12/19/18) (lat calf region) L LE; WBAT. Subjective   General  Chart Reviewed: Yes  Patient assessed for rehabilitation services?: Yes  Additional Pertinent Hx: Maame Ny is a 62 y.o. male with PMH significant for HIV, hepatitis C, HLD, HTN, and DM who presents to the emergency department today complaining of pain, redness, and warmth to a chronic ulcer on his left leg. Patient states that he stopped following up with wound care. He denies fevers at home. On arrival his heart rate is 157. Patient states he did not take his metoprolol today and his partner who is at bedside states that patient is using crystal meth. Family / Caregiver Present: No  Referring Practitioner: Darian Cruz  Diagnosis: Multiple open wounds on Left LE, s/p L  fem pop 12/17/18  Subjective  Subjective: Pt received up in chair-starting to eat breakfast- Pt agreeable to OT at this time . Educated  pt in UE ther ex and and need to move LE inspite of  c/o discomfort from the tegederm dressings . Pt reassured he is not doing any damage moving his L LE but he is  having great difficulty getting past focusing on pulling of dressings .     General Comment  Comments:  Pt provided with theraband adapted with ball  to promote ROM in hand as well. Pt instructed in ther ex. for ROM / flexibility primarily. Will provide written cues tomorrow. assited w/ toileting- pt declined skye shelton to use the urinal inspite of ed impotance of gettingh routines more \"normal\"   Pain Assessment  Patient Currently in Pain: Denies (PCA pain pump in use. )  Pain Assessment: 0-10  Hughes-Baker Pain Rating: Hurts a little bit  Pain Level: 0  Pain Type: Acute pain, Surgical pain  Pain Location: Leg  Pain Orientation: Left  Pain Descriptors: Aching, Constant  Pain Frequency: Continuous  Clinical Progression: Not changed  Patient's Stated Pain Goal: 2  Effect of Pain on Daily Activities: impaired mobility, irritability  Pain Intervention(s): Medication (see eMar) (PCA pump )  Response to Pain Intervention: Patient Satisfied     Social/Functional History  Social/Functional History  Lives With:  (roommate)  Type of Home: House  Home Layout: Laundry in basement  Home Access: Stairs to enter without rails  Entrance Stairs - Number of Steps: 4 steps from sidewalk to porch   Bathroom Shower/Tub: Tub/Shower unit  [employer]: Standard  Bathroom Equipment:  (None)  Bathroom Accessibility: Not accessible  Home Equipment:  (none)  ADL Assistance: Independent (sponge bathes at sink, has not showered since June)  Homemaking Assistance: Needs assistance (roommate assist with laundry, cooking and cleaning)  Ambulation Assistance: Independent  Transfer Assistance: Independent  Active : No  Additional Comments: Pt reports roommate is able to provide assist/support upon his return home. Objective   Vision: Within Functional Limits (Wears Glasses. )  Hearing: Within functional limits       Observation/Palpation  Observation: L Med Thigh region - surg wound vacs in place. Veriflo Wound Vac now in place over L Lat Calf region.       Sensation  Overall Sensation Status: Impaired (Diminished

## 2018-12-21 LAB
ALBUMIN SERPL-MCNC: 2.7 G/DL (ref 3.4–5)
ALP BLD-CCNC: 94 U/L (ref 40–129)
ALT SERPL-CCNC: 20 U/L (ref 10–40)
ANION GAP SERPL CALCULATED.3IONS-SCNC: 12 MMOL/L (ref 3–16)
APTT: 117.9 SEC (ref 26–36)
APTT: 44.7 SEC (ref 26–36)
AST SERPL-CCNC: 23 U/L (ref 15–37)
BASOPHILS ABSOLUTE: 0.1 K/UL (ref 0–0.2)
BASOPHILS RELATIVE PERCENT: 1.3 %
BILIRUB SERPL-MCNC: 0.5 MG/DL (ref 0–1)
BILIRUBIN DIRECT: 0.3 MG/DL (ref 0–0.3)
BILIRUBIN, INDIRECT: 0.2 MG/DL (ref 0–1)
BUN BLDV-MCNC: 17 MG/DL (ref 7–20)
CALCIUM SERPL-MCNC: 8.2 MG/DL (ref 8.3–10.6)
CHLORIDE BLD-SCNC: 99 MMOL/L (ref 99–110)
CO2: 23 MMOL/L (ref 21–32)
CREAT SERPL-MCNC: 1.2 MG/DL (ref 0.9–1.3)
EOSINOPHILS ABSOLUTE: 0 K/UL (ref 0–0.6)
EOSINOPHILS RELATIVE PERCENT: 0.6 %
GFR AFRICAN AMERICAN: >60
GFR NON-AFRICAN AMERICAN: >60
GLUCOSE BLD-MCNC: 133 MG/DL (ref 70–99)
GLUCOSE BLD-MCNC: 142 MG/DL (ref 70–99)
GLUCOSE BLD-MCNC: 176 MG/DL (ref 70–99)
GLUCOSE BLD-MCNC: 184 MG/DL (ref 70–99)
GLUCOSE BLD-MCNC: 211 MG/DL (ref 70–99)
HCT VFR BLD CALC: 30.8 % (ref 40.5–52.5)
HEMOGLOBIN: 9.8 G/DL (ref 13.5–17.5)
LYMPHOCYTES ABSOLUTE: 1 K/UL (ref 1–5.1)
LYMPHOCYTES RELATIVE PERCENT: 13 %
MAGNESIUM: 1.8 MG/DL (ref 1.8–2.4)
MCH RBC QN AUTO: 26.9 PG (ref 26–34)
MCHC RBC AUTO-ENTMCNC: 31.9 G/DL (ref 31–36)
MCV RBC AUTO: 84.2 FL (ref 80–100)
MONOCYTES ABSOLUTE: 0.5 K/UL (ref 0–1.3)
MONOCYTES RELATIVE PERCENT: 7.1 %
NEUTROPHILS ABSOLUTE: 5.8 K/UL (ref 1.7–7.7)
NEUTROPHILS RELATIVE PERCENT: 78 %
PDW BLD-RTO: 20.7 % (ref 12.4–15.4)
PERFORMED ON: ABNORMAL
PHOSPHORUS: 2.4 MG/DL (ref 2.5–4.9)
PLATELET # BLD: 176 K/UL (ref 135–450)
PMV BLD AUTO: 7.3 FL (ref 5–10.5)
POTASSIUM REFLEX MAGNESIUM: 5.1 MMOL/L (ref 3.5–5.1)
RBC # BLD: 3.66 M/UL (ref 4.2–5.9)
SODIUM BLD-SCNC: 134 MMOL/L (ref 136–145)
TOTAL PROTEIN: 6 G/DL (ref 6.4–8.2)
WBC # BLD: 7.5 K/UL (ref 4–11)

## 2018-12-21 PROCEDURE — 94760 N-INVAS EAR/PLS OXIMETRY 1: CPT

## 2018-12-21 PROCEDURE — 6370000000 HC RX 637 (ALT 250 FOR IP): Performed by: NURSE PRACTITIONER

## 2018-12-21 PROCEDURE — 6370000000 HC RX 637 (ALT 250 FOR IP): Performed by: HOSPITALIST

## 2018-12-21 PROCEDURE — 2580000003 HC RX 258: Performed by: HOSPITALIST

## 2018-12-21 PROCEDURE — 97605 NEG PRS WND THER DME<=50SQCM: CPT

## 2018-12-21 PROCEDURE — 99223 1ST HOSP IP/OBS HIGH 75: CPT | Performed by: INTERNAL MEDICINE

## 2018-12-21 PROCEDURE — 6370000000 HC RX 637 (ALT 250 FOR IP): Performed by: INTERNAL MEDICINE

## 2018-12-21 PROCEDURE — 80048 BASIC METABOLIC PNL TOTAL CA: CPT

## 2018-12-21 PROCEDURE — 97530 THERAPEUTIC ACTIVITIES: CPT

## 2018-12-21 PROCEDURE — 84100 ASSAY OF PHOSPHORUS: CPT

## 2018-12-21 PROCEDURE — 6370000000 HC RX 637 (ALT 250 FOR IP): Performed by: SURGERY

## 2018-12-21 PROCEDURE — G8979 MOBILITY GOAL STATUS: HCPCS

## 2018-12-21 PROCEDURE — 2060000000 HC ICU INTERMEDIATE R&B

## 2018-12-21 PROCEDURE — 99232 SBSQ HOSP IP/OBS MODERATE 35: CPT | Performed by: INTERNAL MEDICINE

## 2018-12-21 PROCEDURE — 99024 POSTOP FOLLOW-UP VISIT: CPT | Performed by: NURSE PRACTITIONER

## 2018-12-21 PROCEDURE — 80076 HEPATIC FUNCTION PANEL: CPT

## 2018-12-21 PROCEDURE — G8978 MOBILITY CURRENT STATUS: HCPCS

## 2018-12-21 PROCEDURE — 2580000003 HC RX 258: Performed by: NURSE PRACTITIONER

## 2018-12-21 PROCEDURE — 6360000002 HC RX W HCPCS: Performed by: HOSPITALIST

## 2018-12-21 PROCEDURE — 83735 ASSAY OF MAGNESIUM: CPT

## 2018-12-21 PROCEDURE — 36592 COLLECT BLOOD FROM PICC: CPT

## 2018-12-21 PROCEDURE — 97116 GAIT TRAINING THERAPY: CPT

## 2018-12-21 PROCEDURE — 85730 THROMBOPLASTIN TIME PARTIAL: CPT

## 2018-12-21 PROCEDURE — APPNB45 APP NON BILLABLE 31-45 MINUTES: Performed by: NURSE PRACTITIONER

## 2018-12-21 PROCEDURE — 85025 COMPLETE CBC W/AUTO DIFF WBC: CPT

## 2018-12-21 PROCEDURE — 6360000002 HC RX W HCPCS: Performed by: NURSE PRACTITIONER

## 2018-12-21 PROCEDURE — APPSS30 APP SPLIT SHARED TIME 16-30 MINUTES: Performed by: NURSE PRACTITIONER

## 2018-12-21 PROCEDURE — 2580000003 HC RX 258: Performed by: SURGERY

## 2018-12-21 RX ORDER — OXYCODONE HYDROCHLORIDE AND ACETAMINOPHEN 5; 325 MG/1; MG/1
2 TABLET ORAL EVERY 4 HOURS PRN
Status: DISCONTINUED | OUTPATIENT
Start: 2018-12-21 | End: 2018-12-26

## 2018-12-21 RX ORDER — INSULIN GLARGINE 100 [IU]/ML
15 INJECTION, SOLUTION SUBCUTANEOUS NIGHTLY
Status: DISCONTINUED | OUTPATIENT
Start: 2018-12-21 | End: 2018-12-27 | Stop reason: HOSPADM

## 2018-12-21 RX ORDER — OXYCODONE HYDROCHLORIDE AND ACETAMINOPHEN 5; 325 MG/1; MG/1
1 TABLET ORAL EVERY 4 HOURS PRN
Status: DISCONTINUED | OUTPATIENT
Start: 2018-12-21 | End: 2018-12-27 | Stop reason: HOSPADM

## 2018-12-21 RX ADMIN — INSULIN LISPRO 5 UNITS: 100 INJECTION, SOLUTION INTRAVENOUS; SUBCUTANEOUS at 19:03

## 2018-12-21 RX ADMIN — Medication 10 ML: at 09:52

## 2018-12-21 RX ADMIN — METOPROLOL SUCCINATE 12.5 MG: 25 TABLET, EXTENDED RELEASE ORAL at 09:58

## 2018-12-21 RX ADMIN — CALCIUM 500 MG: 500 TABLET ORAL at 22:27

## 2018-12-21 RX ADMIN — INSULIN LISPRO 5 UNITS: 100 INJECTION, SOLUTION INTRAVENOUS; SUBCUTANEOUS at 13:58

## 2018-12-21 RX ADMIN — PANTOPRAZOLE SODIUM 40 MG: 40 TABLET, DELAYED RELEASE ORAL at 07:46

## 2018-12-21 RX ADMIN — DIGOXIN 125 MCG: 125 TABLET ORAL at 10:13

## 2018-12-21 RX ADMIN — SODIUM CHLORIDE: 9 INJECTION, SOLUTION INTRAVENOUS at 08:15

## 2018-12-21 RX ADMIN — VITAMIN D, TAB 1000IU (100/BT) 1000 UNITS: 25 TAB at 09:51

## 2018-12-21 RX ADMIN — PIPERACILLIN SODIUM,TAZOBACTAM SODIUM 3.38 G: 3; .375 INJECTION, POWDER, FOR SOLUTION INTRAVENOUS at 06:18

## 2018-12-21 RX ADMIN — PIPERACILLIN SODIUM,TAZOBACTAM SODIUM 3.38 G: 3; .375 INJECTION, POWDER, FOR SOLUTION INTRAVENOUS at 22:28

## 2018-12-21 RX ADMIN — INSULIN LISPRO 2 UNITS: 100 INJECTION, SOLUTION INTRAVENOUS; SUBCUTANEOUS at 22:34

## 2018-12-21 RX ADMIN — ASPIRIN 81 MG 81 MG: 81 TABLET ORAL at 09:52

## 2018-12-21 RX ADMIN — OXYCODONE AND ACETAMINOPHEN 2 TABLET: 5; 325 TABLET ORAL at 15:14

## 2018-12-21 RX ADMIN — PIPERACILLIN SODIUM,TAZOBACTAM SODIUM 3.38 G: 3; .375 INJECTION, POWDER, FOR SOLUTION INTRAVENOUS at 14:29

## 2018-12-21 RX ADMIN — SODIUM CHLORIDE: 9 INJECTION, SOLUTION INTRAVENOUS at 19:44

## 2018-12-21 RX ADMIN — DOLUTEGRAVIR SODIUM 50 MG: 50 TABLET, FILM COATED ORAL at 09:58

## 2018-12-21 RX ADMIN — ATORVASTATIN CALCIUM 80 MG: 80 TABLET, FILM COATED ORAL at 09:51

## 2018-12-21 RX ADMIN — OXYCODONE AND ACETAMINOPHEN 2 TABLET: 5; 325 TABLET ORAL at 23:25

## 2018-12-21 RX ADMIN — APIXABAN 5 MG: 5 TABLET, FILM COATED ORAL at 17:24

## 2018-12-21 RX ADMIN — Medication: at 04:34

## 2018-12-21 RX ADMIN — OXYCODONE AND ACETAMINOPHEN 2 TABLET: 5; 325 TABLET ORAL at 19:22

## 2018-12-21 RX ADMIN — INSULIN GLARGINE 15 UNITS: 100 INJECTION, SOLUTION SUBCUTANEOUS at 22:35

## 2018-12-21 RX ADMIN — HYDROMORPHONE HYDROCHLORIDE 1 MG: 1 INJECTION, SOLUTION INTRAMUSCULAR; INTRAVENOUS; SUBCUTANEOUS at 16:44

## 2018-12-21 RX ADMIN — Medication 10 ML: at 22:28

## 2018-12-21 RX ADMIN — INSULIN LISPRO 3 UNITS: 100 INJECTION, SOLUTION INTRAVENOUS; SUBCUTANEOUS at 19:02

## 2018-12-21 RX ADMIN — EMTRICITABINE AND TENOFOVIR DISOPROXIL FUMARATE 1 TABLET: 200; 300 TABLET, FILM COATED ORAL at 22:27

## 2018-12-21 RX ADMIN — MONTELUKAST SODIUM 10 MG: 10 TABLET, COATED ORAL at 22:27

## 2018-12-21 RX ADMIN — CALCIUM 500 MG: 500 TABLET ORAL at 09:52

## 2018-12-21 RX ADMIN — INSULIN LISPRO 3 UNITS: 100 INJECTION, SOLUTION INTRAVENOUS; SUBCUTANEOUS at 07:44

## 2018-12-21 RX ADMIN — INSULIN LISPRO 5 UNITS: 100 INJECTION, SOLUTION INTRAVENOUS; SUBCUTANEOUS at 07:45

## 2018-12-21 ASSESSMENT — PAIN SCALES - GENERAL
PAINLEVEL_OUTOF10: 0
PAINLEVEL_OUTOF10: 10
PAINLEVEL_OUTOF10: 10
PAINLEVEL_OUTOF10: 8
PAINLEVEL_OUTOF10: 9
PAINLEVEL_OUTOF10: 5
PAINLEVEL_OUTOF10: 7
PAINLEVEL_OUTOF10: 8

## 2018-12-21 ASSESSMENT — PAIN SCALES - WONG BAKER: WONGBAKER_NUMERICALRESPONSE: 0

## 2018-12-21 ASSESSMENT — PAIN DESCRIPTION - LOCATION: LOCATION: LEG

## 2018-12-21 ASSESSMENT — PAIN DESCRIPTION - ORIENTATION: ORIENTATION: LEFT;LOWER

## 2018-12-21 NOTE — PROGRESS NOTES
to light. Conjunctivae/corneas clear. Neck: Supple, with full range of motion. No jugular venous distention. Trachea midline. Respiratory:  Normal respiratory effort. Clear to auscultation, bilaterally without Rales/Wheezes/Rhonchi. Cardiovascular: Regular rate and rhythm with normal S1/S2 without murmurs, rubs or gallops. Abdomen: Soft, non-tender, non-distended with normal bowel sounds. Musculoskeletal: No clubbing, cyanosis or edema bilaterally. Skin: Skin color, texture, turgor normal.  Wound vac applied to Left leg ulcer, surgical site without drainage, dehiscence medial thigh  Neurologic:  Neurovascularly intact without any focal sensory/motor deficits. Cranial nerves: II-XII intact, grossly non-focal.  Psychiatric: Alert and oriented, thought content appropriate, normal insight  Capillary Refill: Brisk,< 3 seconds   Peripheral Pulses: +2 palpable, equal bilaterally       Labs:   Recent Labs      12/19/18   0441  12/20/18   0520  12/21/18   0530   WBC  11.9*  8.8  7.5   HGB  11.8*  10.3*  9.8*   HCT  38.1*  32.5*  30.8*   PLT  282  186  176     Recent Labs      12/19/18   0441  12/20/18   0520  12/21/18   0530   NA  132*  135*  134*   K  4.5  5.0  5.1   CL  95*  100  99   CO2  25  25  23   BUN  29*  22*  17   CREATININE  1.6*  1.2  1.2   CALCIUM  8.2*  8.0*  8.2*   PHOS  4.2  3.0  2.4*     Recent Labs      12/19/18   0441  12/20/18   0520  12/21/18   0530   AST  18  21  23   ALT  27  21  20   BILIDIR  0.4*  <0.2  0.3   BILITOT  0.6  0.5  0.5   ALKPHOS  72  69  94     No results for input(s): INR in the last 72 hours.   Recent Labs      12/19/18   0120  12/19/18   0710  12/19/18   1530   TROPONINI  0.03*  0.02*  0.02*       Urinalysis:    Lab Results   Component Value Date    NITRU Negative 12/10/2018    WBCUA 0-2 12/10/2018    RBCUA 5-10 12/10/2018    BLOODU MODERATE 12/10/2018    SPECGRAV 1.023 12/10/2018    GLUCOSEU Negative 12/10/2018       Radiology:  XR CHEST PORTABLE   Final Result   Appropriate

## 2018-12-21 NOTE — CARE COORDINATION
[employer]  No Part D for drug  Cost for drug cost is $18.00 day  Supplies covered by Home care.  Malissa Adams LPN  CTN with  [facility],  [address], Fax [phone]

## 2018-12-21 NOTE — DISCHARGE INSTR - COC
PF (Flulaval, Fluarix) 12/11/2018       Active Problems:  Patient Active Problem List   Diagnosis Code    Wounds, multiple open, lower extremity, left, initial encounter S81.802A    Wound infection T14. 8XXA, L08.9    PAD (peripheral artery disease) ([facility]) I73.9    Amphetamine abuse ([facility]) F15.10    Elevated troponin R74.8    Human immunodeficiency virus (HIV) disease ([facility] B20    [facility] virus [facility]    Atrial flutter ([facility]) I48.92    Ischemic cardiomyopathy I25.5    Pyogenic inflammation of bone ([facility]) M86.9    Atherosclerosis of native artery of extremity with ulceration ([facility] I70.25    Hypokalemia E87.6    Hyperkalemia E87.5    Hypotension due to drugs I95.2    Shock ([facility]) R57.9    Atrial fibrillation ([facility]) I48.91    GALI (acute kidney injury) ([facility] N17.9    Tachycardia R00.0       Isolation/Infection:   Isolation          No Isolation            Nurse Assessment:  Last Vital Signs: /77   Pulse 88   Temp 97.7 °F (36.5 °C) (Axillary)   Resp 18   Ht 6' 2.02\" (1.88 m)   Wt 187 lb 6.3 oz (85 kg)   SpO2 96%   BMI 24.05 kg/m²     Last documented pain score (0-10 scale): Pain Level: 8  Last Weight:   Wt Readings from Last 1 Encounters:   12/21/18 187 lb 6.3 oz (85 kg)     Mental Status:  oriented    IV Access:  R PICC 12/19/18    Nursing Mobility/ADLs:  Walking   Assisted  Transfer  Assisted  Bathing  Assisted  Dressing  Assisted  Toileting  Assisted  Feeding  Independent  Med Admin  Assisted  Med Delivery   whole    Wound Care Documentation and Therapy:  Negative Pressure Wound Therapy Leg Left;Lateral;Anterior (Active)   $ Standard NPWT >50 sq cm PER TX $ Yes 12/19/2018  4:43 PM   Wound Type Surgical 12/20/2018  9:16 PM   Unit Type Veraflo with installation 12/19/2018  5:00 PM   Dressing Type Black foam 12/20/2018  4:00 PM   Number of pieces used 2 12/19/2018  4:43 PM   Cycle On; Intermittent 12/20/2018  9:16 PM   Target Pressure (mmHg) 125 12/20/2018  4:00 PM Intensity 1 12/19/2018  4:43 PM   Irrigation Solution Normal Saline 12/20/2018  9:16 PM   Dwell Volume 50 mL 12/20/2018  4:00 PM   Soak Duration 10 minutes 12/20/2018  4:00 PM   Soak Frequency 4 hours 12/20/2018  4:00 PM   Canister changed? Yes 12/20/2018  6:40 PM   Dressing Status Clean;Dry; Intact 12/20/2018  9:16 PM   Dressing Changed Changed/New 12/19/2018  4:43 PM   Drainage Amount Moderate 12/20/2018  4:00 PM   Drainage Description Serosanguinous 12/21/2018  6:00 AM   Dressing Change Due 12/21/18 12/20/2018  4:00 PM   Output (ml) 150 ml 12/21/2018  6:00 AM   Wound Assessment AIDEN 12/20/2018  4:00 PM   Marilyn-wound Assessment AIDEN 12/20/2018  4:00 PM   Number of days: 1       Incision 12/10/18 Leg Left (Active)   Wound Image   12/19/2018  4:43 PM   Wound Assessment AIDEN 12/20/2018  9:16 PM   Marilyn-wound Assessment AIDEN 12/20/2018  9:16 PM   Wound Length (cm) 21 cm 12/19/2018  4:43 PM   Wound Width (cm) 8 cm 12/19/2018  4:43 PM   Wound Depth (cm)  2.1 12/19/2018  4:43 PM   Closure None 12/17/2018  8:00 AM   Drainage Amount Moderate 12/20/2018 12:00 PM   Drainage Description Serosanguinous 12/20/2018 12:00 PM   Odor None 12/20/2018 12:00 PM   Dressing/Treatment Vacuum dressing 12/20/2018  9:16 PM   Dressing Changed Changed/New 12/19/2018  4:43 PM   Dressing Status Clean;Dry; Intact 12/20/2018  9:16 PM   Dressing Change Due 12/21/18 12/20/2018 12:00 PM   Number of days: 10       Incision 12/17/18 Groin Left (Active)   Wound Assessment AIDEN 12/20/2018  9:16 PM   Marilyn-wound Assessment AIDEN 12/20/2018  9:16 PM   Drainage Amount None 12/20/2018  9:16 PM   Drainage Description Serosanguinous 12/20/2018  4:00 AM   Odor None 12/20/2018 12:00 PM   Dressing/Treatment Dry dressing;Vacuum dressing 12/20/2018 12:00 PM   Dressing Status Old drainage;Dry; Intact 12/20/2018 12:00 PM   Number of days: 3       Wound 12/10/18 Leg Anterior;Right several small open wounds (Active)   Wound Other 12/20/2018  9:16 PM   Dressing Status Clean;Dry

## 2018-12-21 NOTE — CARE COORDINATION
[facility] Wound Ostomy Continence Nurse  Follow-up Progress Note       NAME:  Kofi Espinal  MEDICAL RECORD NUMBER:  [id number]  AGE:  62 y.o. GENDER:  male  :  1960  TODAY'S DATE:  2018    Subjective: Follow up to change NWPT dressing with instillation with NSS. Wound appearance with granulation tissue and bleeding. Dressing change less painful   See wound details and plan of care below. Wound Identification:  Wound Type: non-healing surgical  Contributing Factors: edema, chronic pressure, decreased mobility, shear force, malnutrition, poor hygiene and non-adherence        Patient Goal of Care:  [x] Wound Healing  [] Odor Control  [] Palliative Care  [] Pain Control   [] Other:     Objective:    /70   Pulse 84   Temp 97.9 °F (36.6 °C) (Oral)   Resp 18   Ht 6' 2.02\" (1.88 m)   Wt 187 lb 6.3 oz (85 kg)   SpO2 100%   BMI 24.05 kg/m²   Jorge Risk Score: Jorge Scale Score: 16 at risk   Assessment:   Measurements:  Negative Pressure Wound Therapy Leg Left;Lateral;Anterior (Active)   $ Standard NPWT >50 sq cm PER TX $ Yes 2018  3:39 PM   Wound Type Surgical 2018  3:39 PM   Unit Type Veraflo  2018  3:39 PM   Dressing Type Black foam 2018  3:39 PM   Number of pieces used 2 2018  3:39 PM   Cycle Intermittent; On 2018  3:39 PM   Target Pressure (mmHg) 125 2018  3:39 PM   Intensity 1 2018  3:39 PM   Irrigation Solution Normal Saline 2018  3:39 PM   Dwell Volume 48 mL 2018  3:39 PM   Soak Duration 10 minutes 2018  3:39 PM   Soak Frequency 4 hours 2018  3:39 PM   Canister changed?  No 2018  3:39 PM   Dressing Status Changed 2018  3:39 PM   Dressing Changed Changed/New 2018  3:39 PM   Drainage Amount Moderate 2018  3:39 PM   Drainage Description Serosanguinous 2018  3:39 PM   Dressing Change Due 18  3:39 PM   Output (ml) 150 ml 2018  6:00 AM

## 2018-12-21 NOTE — PROGRESS NOTES
(PROTONIX) 40 MG tablet Take 40 mg by mouth daily       promethazine (PHENERGAN) 25 MG tablet TAKE 1 TABLET BY MOUTH TWICE A DAY AS NEEDED FOR NAUSEA.  Simethicone 125 MG CAPS Take 1 tablet by mouth      traMADol (ULTRAM) 50 MG tablet Take 50 mg by mouth every 6 hours as needed. Kurt Whitfield zolpidem (AMBIEN) 10 MG tablet Take 10 mg by mouth nightly as needed. .         Allergies:  Patient has no known allergies. Immunizations :   Immunization History   Administered Date(s) Administered    Influenza, Quadv, 6 mo and older, IM, PF (Flulaval, Fluarix) 12/11/2018       Social History:    Social History   Substance Use Topics    Smoking status: Never Smoker    Smokeless tobacco: Never Used    Alcohol use No     History   Smoking Status    Never Smoker   Smokeless Tobacco    Never Used      Family History : no DVT no COPD       REVIEW OF SYSTEMS:    No fever / chills / sweats. No weight loss. No visual change, eye pain, eye discharge. No oral lesion, sore throat, dysphagia. Denies cough / sputum/Sob   Denies chest pain, palpitations/ dizziness  Denies nausea/ vomiting/abdominal pain/diarrhea. Denies dysuria or change in urinary function. Denies joint swelling or pain. No myalgia, arthralgia. No rashes, skin lesions   Denies focal weakness, sensory change or other neurologic symptoms  No lymph node swelling or tenderness.     Left leg pain and swelling +  And Left groin pain+     PHYSICAL EXAM:      Vitals:    /70   Pulse 84   Temp 97.9 °F (36.6 °C) (Oral)   Resp 18   Ht 6' 2.02\" (1.88 m)   Wt 187 lb 6.3 oz (85 kg)   SpO2 100%   BMI 24.05 kg/m²   General Appearance: alert,in no acute distress, +  pallor, no icterus chronically ill appearing man + poor dentition on nasal cannula    Skin: warm and dry, no rash or erythema  Head: normocephalic and atraumatic  Eyes: pupils equal, round, and reactive to light, conjunctivae normal  ENT: tympanic membrane, external ear and ear canal normal 0157   Order Status: Completed Specimen: Urine, clean catch Updated: 12/11/18 0840    Urine Culture, Routine No growth at 18-36 hours   Narrative:     ORDER#: [id number]                          ORDERED BY: An Reeder  SOURCE: Urine Clean Catch                  COLLECTED:  12/09/18 01:57  ANTIBIOTICS AT SERGEI. :                      RECEIVED :  12/10/18 02:15  Performed at:  [facility]  [address] [employer] [address]   Phone [phone]   Culture Blood #2 [[id number]] Collected: 12/09/18 2328   Order Status: Completed Specimen: Blood Updated: 12/11/18 0115    Culture, Blood 2 No Growth to date.  Any change in status will be called. Narrative:     ORDER#: [id number]                          ORDERED BY: JONAS HUGHES  SOURCE: Blood Antecubital-Right            COLLECTED:  12/09/18 23:28  ANTIBIOTICS AT SERGEI. :                      RECEIVED :  12/09/18 23:37  If child <=2 yrs old please draw pediatric bottle. ~Blood Culture #2  Performed at:  [facility]  [address] [place], [place] [employer] [address]   Phone [phone]   Culture Blood #1 [[id number]] Collected: 12/09/18 2327   Order Status: Completed Specimen: Blood from Blood Updated: 12/11/18 0115    Blood Culture, Routine No Growth to date.  Any change in status will be called. Narrative:     ORDER#: [id number]                          ORDERED BY: JONAS HUGHES  SOURCE: Blood Antecubital-Right            COLLECTED:  12/09/18 23:27  ANTIBIOTICS AT SERGEI. :                      RECEIVED :  12/09/18 23:36  If child <=2 yrs old please draw pediatric bottle. ~Blood Culture #1  Performed at:  [facility]  [address], [facility] [employer] [address]   Phone [phone]   CULTURE BLOOD #1 [[id number]] Collected: 12/10/18 0000   Order Status: Canceled Specimen: Blood from Blood    CULTURE BLOOD #2 [[id number]] Collected: 12/10/18 0000   Order Status: Canceled Specimen: Blood        ENTEROBACTER AEROGENES     Antibiotic Interpretation KALLIE Unit   amoxicillin-clavulanate Resistant >16/8 mcg/mL   ampicillin Resistant >16 mcg/mL   ceFAZolin Resistant >16 mcg/mL   cefTRIAXone Sensitive <=1 mcg/mL   cefepime Sensitive <=2 mcg/mL   cefotaxime Sensitive <=2 mcg/mL   cefuroxime Resistant <=4 mcg/mL   ciprofloxacin Sensitive <=1 mcg/mL   gentamicin Sensitive <=4 mcg/mL   meropenem Sensitive <=1 mcg/mL   piperacillin-tazobactam Sensitive <=16 mcg/mL   trimethoprim-sulfamethoxazole Resistant >2/38 mcg/mL        FINAL DIAGNOSIS:    Left leg wound tissue, debridement:  -Soft tissue with acute inflammation and granulation tissue.  - Negative for malignancy.  JIAJA/JIAJA    IMAGING:    XR CHEST PORTABLE   Final Result   Appropriate positioning of right PICC line. VL PRE OP VEIN MAPPING   Final Result      XR TIBIA FIBULA LEFT (2 VIEWS)   Final Result   No definite cortical destruction to suggest osteomyelitis. If clinical   suspicion persists, MRI would be of further value.                All the pertinent images and reports for the current Hospitalization were reviewed by me     Scheduled Meds:   insulin glargine  15 Units Subcutaneous Nightly    dolutegravir sodium  50 mg Oral Daily    emtricitabine-tenofovir  1 tablet Oral Nightly    piperacillin-tazobactam  3.375 g Intravenous Q8H    lidocaine 1 % injection  5 mL Intradermal Once    sodium chloride flush  10 mL Intravenous 2 times per day    metoprolol succinate  12.5 mg Oral Daily    insulin lispro  5 Units Subcutaneous TID WC    insulin lispro  0-18 Units Subcutaneous TID WC    insulin lispro  0-9 Units Subcutaneous Nightly    digoxin  125 mcg Oral Daily    calcium elemental  500 mg Oral BID    vitamin D  1,000 Units Oral Daily    atorvastatin  80 mg Oral Daily    aspirin  81 mg Oral Daily    montelukast  10 mg Oral Nightly    pantoprazole  40 mg Oral QAM AC       Continuous Infusions:   heparin (porcine) 7.2 mL/hr (12/21/18 0952)    dextrose      sodium chloride 100 mL/hr at 12/21/18 0815       PRN Meds:  oxyCODONE-acetaminophen **OR** oxyCODONE-acetaminophen, HYDROmorphone, sodium chloride flush, naloxone, bisacodyl, glucose, dextrose, glucagon (rDNA), dextrose, acetaminophen, diphenhydrAMINE      Assessment:   Left leg necrotic wound infection   Odor and drainage and mixed infectious process on admit   Left leg some concern for Vascular process  CAD with h/o MI  Medical non compliance per  records   Suspect mixed infectious process  Concern for osteomyelitis  HIV on therapy   Diabetic leg infection    Per  records CD4 check last > 700 and he says he is complaint with HIV therapy    GAIL      Given the depth of the infection and duration concern for extension down to the bone and s/p OR for debridement and Cx GNR mixed process -Polymicrobial infection    Risk for limb loss high and he understand  willing to comply with recommendations and medical care      S/p Vascular intervention and revascularization femoropopliteal bypass grafting on Left leg  on 12/17      He has some worsening of creat and he does have cardiomyopathy and has [place] Flushing BP for a while and has on going trop leak is concerning    Creat now better and will change his HAART therapy and discussed about the regimen    Has wound vac in place and anticipate x long course of IV abx and needs to treat like osteomyelitis given the bone at the base of the wound      Labs, Microbiology, Radiology and all the pertinent results from current hospitalization and  care every where were reviewed  by me as a part of the evaluation   Plan: 1. Creat now trend down   2. Cont local care  - wound vac placed  Pictures reviewed   3. Cont IV Zosyn for mixed infectious process dose reduced to x Q 8 HRS FOR NOW as creat is better   4. ESR, CRP check repeat   5. Cont HAART -  Dolutegravir + Truvada for now   6. S/p Vascular intervention  Completed   7. Will need long course of IV abx given the depth of the infection and bone at the base anticipate x 4-6 weeks of therapy     Discussed with patient/Family  D/w RN     Thanks for allowing me to participate in your patient's care and please call me with any questions or concerns.     Ashley Urias MD  Infectious Disease  [facility] Physician  Phone: [phone]   Fax : [phone]

## 2018-12-21 NOTE — PROGRESS NOTES
General and Vascular Surgery                                                           Daily Progress Note                                                             Rylee Llanos CNP    Pt Name: Maame Ny  Medical Record Number: [id number]  Date of Birth 1960   Today's Date: 12/21/2018    ASSESSMENT  CC: leg pain     Post-op Day #4 fem pop bypass     Subjective:      Cr improved, pain controlled, wound VAC in place        PLAN  1. Continue VAC, change today, SASKIA dressing removed  Staples well approximated  2. transition off PCA today - oral meds ordered  3. Continue abtx- PICC in place   4. On heparin gtt for afib, cardiology following  5. Enc po intake   6. DC planning - wants to go home with home care and not to LTAC or SNF,  likely have wound VAC and IV abtx, appreciate SW assistance. OK to DC when arrangements made from vascular standpoint. Lisa Pichardo has improved from yesterday. Pain is well controlled. He has no nausea and no vomiting. He has passed flatus and has had a bowel movement. He is tolerating regular diet. Current activity is up with assistance. OBJECTIVE  VITALS:  height is 6' 2.02\" (1.88 m) and weight is 187 lb 6.3 oz (85 kg). His axillary temperature is 97.7 °F (36.5 °C). His blood pressure is 126/77 and his pulse is 88. His respiration is 18 and oxygen saturation is 96%.    VITALS:  /77   Pulse 88   Temp 97.7 °F (36.5 °C) (Axillary)   Resp 18   Ht 6' 2.02\" (1.88 m)   Wt 187 lb 6.3 oz (85 kg)   SpO2 96%   BMI 24.05 kg/m²   INTAKE/OUTPUT:      Intake/Output Summary (Last 24 hours) at 12/21/18 1019  Last data filed at 12/21/18 1014   Gross per 24 hour   Intake          2942.68 ml   Output             1860 ml   Net          1082.68 ml     GENERAL: alert, no distress  LUNGS: no increased WOB  ABDOMEN: soft, non-tender, non-distended and bowel sounds present in all 4 quadrants  EXTERMITY:  Warm  Good PT

## 2018-12-21 NOTE — CARE COORDINATION
LEXIW met with patient. He would like [facility] for home care. Patient also needing IV abx and wound vac. Call to Miranda Campoverde with [facility]. Chelsy to arrange wound vac through [place] and will coordinate delivery. Patient may need transportation assistance at discharge. He reports he and roommate are working on solidifying a plan. SW following and will assist as needed. Electronically signed by ROSIBEL Vargas on 12/21/2018 at 10:52 AM      Spoke with PT who reports patient will need a RW at discharge. Referral made to Braulio Cool at [address] [place] [address] for possible dc over the weekend. Will need MD orders for RW.     Electronically signed by ROSIBEL Vargas on 12/21/2018 at 11:44 AM

## 2018-12-21 NOTE — PROGRESS NOTES
Units Subcutaneous TID WC    insulin lispro  0-18 Units Subcutaneous TID WC    insulin lispro  0-9 Units Subcutaneous Nightly    digoxin  125 mcg Oral Daily    calcium elemental  500 mg Oral BID    vitamin D  1,000 Units Oral Daily    atorvastatin  80 mg Oral Daily    aspirin  81 mg Oral Daily    montelukast  10 mg Oral Nightly    pantoprazole  40 mg Oral QAM AC      HYDROmorphone      heparin (porcine) 7.2 mL/hr (12/21/18 0952)    dextrose      sodium chloride 100 mL/hr at 12/21/18 0815     sodium chloride flush, naloxone, bisacodyl, glucose, dextrose, glucagon (rDNA), dextrose, acetaminophen, diphenhydrAMINE    Lab Data:  CBC:   Recent Labs      12/19/18   0441  12/20/18   0520  12/21/18   0530   WBC  11.9*  8.8  7.5   HGB  11.8*  10.3*  9.8*   PLT  282  186  176     BMP:    Recent Labs      12/19/18   0441  12/20/18   0520  12/21/18   0530   NA  132*  135*  134*   K  4.5  5.0  5.1   CO2  25  25  23   BUN  29*  22*  17   CREATININE  1.6*  1.2  1.2     LIVR:   Recent Labs      12/19/18   0441  12/20/18   0520  12/21/18   0530   AST  18  21  23   ALT  27  21  20     INR:  No results for input(s): INR in the last 72 hours. APTT:   Recent Labs      12/19/18   2107  12/20/18   0520  12/21/18   0530   APTT  59.6*  56.6*  117.9*     BNP:  No results for input(s): BNP in the last 72 hours. Imaging:    Patient appears to be in atrial fibrillation.   Overall left ventricular systolic function appears severely reduced.   Ejection fraction is visually estimated to be 20% with diffuse hypokinesis   and minor regional variations. Normal left ventricular wall thickness and   cavity size. Spontaneous echo contrast noted in the left ventricle.  Possible   small apical mural thrombus.   The right ventricle appears mildly dilated with severely reduced systolic   function.   The left atrium is severely dilated.   The right atrium is moderately dilated.   Trivial mitral regurgitation.   Mild tricuspid

## 2018-12-21 NOTE — CARE COORDINATION
[facility] delivered requested [address] to patient and reviewed insurance coverage and equipment set up with patient.     Thank you for the referral.  Electronically signed by Olegario Chong on 12/21/2018 at 4:35 PM Cell ph# [phone]

## 2018-12-22 LAB
C-REACTIVE PROTEIN: 31.5 MG/L (ref 0–5.1)
GLUCOSE BLD-MCNC: 125 MG/DL (ref 70–99)
GLUCOSE BLD-MCNC: 179 MG/DL (ref 70–99)
GLUCOSE BLD-MCNC: 206 MG/DL (ref 70–99)
GLUCOSE BLD-MCNC: 92 MG/DL (ref 70–99)
PERFORMED ON: ABNORMAL
PERFORMED ON: NORMAL
SEDIMENTATION RATE, ERYTHROCYTE: 54 MM/HR (ref 0–20)

## 2018-12-22 PROCEDURE — 6370000000 HC RX 637 (ALT 250 FOR IP): Performed by: HOSPITALIST

## 2018-12-22 PROCEDURE — 2580000003 HC RX 258: Performed by: HOSPITALIST

## 2018-12-22 PROCEDURE — 6370000000 HC RX 637 (ALT 250 FOR IP): Performed by: INTERNAL MEDICINE

## 2018-12-22 PROCEDURE — 6360000002 HC RX W HCPCS: Performed by: NURSE PRACTITIONER

## 2018-12-22 PROCEDURE — 6360000002 HC RX W HCPCS: Performed by: HOSPITALIST

## 2018-12-22 PROCEDURE — 6370000000 HC RX 637 (ALT 250 FOR IP): Performed by: SURGERY

## 2018-12-22 PROCEDURE — 94760 N-INVAS EAR/PLS OXIMETRY 1: CPT

## 2018-12-22 PROCEDURE — 99024 POSTOP FOLLOW-UP VISIT: CPT | Performed by: SURGERY

## 2018-12-22 PROCEDURE — 2580000003 HC RX 258: Performed by: NURSE PRACTITIONER

## 2018-12-22 PROCEDURE — 36592 COLLECT BLOOD FROM PICC: CPT

## 2018-12-22 PROCEDURE — 85652 RBC SED RATE AUTOMATED: CPT

## 2018-12-22 PROCEDURE — 6370000000 HC RX 637 (ALT 250 FOR IP): Performed by: NURSE PRACTITIONER

## 2018-12-22 PROCEDURE — 2060000000 HC ICU INTERMEDIATE R&B

## 2018-12-22 PROCEDURE — 86140 C-REACTIVE PROTEIN: CPT

## 2018-12-22 PROCEDURE — 99233 SBSQ HOSP IP/OBS HIGH 50: CPT | Performed by: INTERNAL MEDICINE

## 2018-12-22 PROCEDURE — 99232 SBSQ HOSP IP/OBS MODERATE 35: CPT | Performed by: INTERNAL MEDICINE

## 2018-12-22 RX ORDER — FUROSEMIDE 10 MG/ML
20 INJECTION INTRAMUSCULAR; INTRAVENOUS ONCE
Status: COMPLETED | OUTPATIENT
Start: 2018-12-22 | End: 2018-12-22

## 2018-12-22 RX ADMIN — OXYCODONE AND ACETAMINOPHEN 2 TABLET: 5; 325 TABLET ORAL at 13:15

## 2018-12-22 RX ADMIN — DIGOXIN 125 MCG: 125 TABLET ORAL at 08:25

## 2018-12-22 RX ADMIN — EMTRICITABINE AND TENOFOVIR DISOPROXIL FUMARATE 1 TABLET: 200; 300 TABLET, FILM COATED ORAL at 22:13

## 2018-12-22 RX ADMIN — INSULIN LISPRO 5 UNITS: 100 INJECTION, SOLUTION INTRAVENOUS; SUBCUTANEOUS at 08:26

## 2018-12-22 RX ADMIN — OXYCODONE AND ACETAMINOPHEN 2 TABLET: 5; 325 TABLET ORAL at 08:24

## 2018-12-22 RX ADMIN — MONTELUKAST SODIUM 10 MG: 10 TABLET, COATED ORAL at 22:13

## 2018-12-22 RX ADMIN — INSULIN LISPRO 5 UNITS: 100 INJECTION, SOLUTION INTRAVENOUS; SUBCUTANEOUS at 16:36

## 2018-12-22 RX ADMIN — HYDROMORPHONE HYDROCHLORIDE 1 MG: 1 INJECTION, SOLUTION INTRAMUSCULAR; INTRAVENOUS; SUBCUTANEOUS at 19:15

## 2018-12-22 RX ADMIN — HYDROMORPHONE HYDROCHLORIDE 1 MG: 1 INJECTION, SOLUTION INTRAMUSCULAR; INTRAVENOUS; SUBCUTANEOUS at 14:12

## 2018-12-22 RX ADMIN — HYDROMORPHONE HYDROCHLORIDE 1 MG: 1 INJECTION, SOLUTION INTRAMUSCULAR; INTRAVENOUS; SUBCUTANEOUS at 05:13

## 2018-12-22 RX ADMIN — INSULIN LISPRO 6 UNITS: 100 INJECTION, SOLUTION INTRAVENOUS; SUBCUTANEOUS at 16:38

## 2018-12-22 RX ADMIN — APIXABAN 5 MG: 5 TABLET, FILM COATED ORAL at 08:25

## 2018-12-22 RX ADMIN — INSULIN LISPRO 2 UNITS: 100 INJECTION, SOLUTION INTRAVENOUS; SUBCUTANEOUS at 22:48

## 2018-12-22 RX ADMIN — PANTOPRAZOLE SODIUM 40 MG: 40 TABLET, DELAYED RELEASE ORAL at 06:29

## 2018-12-22 RX ADMIN — Medication 10 ML: at 22:13

## 2018-12-22 RX ADMIN — FUROSEMIDE 20 MG: 10 INJECTION, SOLUTION INTRAMUSCULAR; INTRAVENOUS at 16:32

## 2018-12-22 RX ADMIN — PIPERACILLIN SODIUM,TAZOBACTAM SODIUM 3.38 G: 3; .375 INJECTION, POWDER, FOR SOLUTION INTRAVENOUS at 14:12

## 2018-12-22 RX ADMIN — DOLUTEGRAVIR SODIUM 50 MG: 50 TABLET, FILM COATED ORAL at 13:28

## 2018-12-22 RX ADMIN — PIPERACILLIN SODIUM,TAZOBACTAM SODIUM 3.38 G: 3; .375 INJECTION, POWDER, FOR SOLUTION INTRAVENOUS at 06:29

## 2018-12-22 RX ADMIN — APIXABAN 5 MG: 5 TABLET, FILM COATED ORAL at 22:13

## 2018-12-22 RX ADMIN — VITAMIN D, TAB 1000IU (100/BT) 1000 UNITS: 25 TAB at 08:25

## 2018-12-22 RX ADMIN — PIPERACILLIN SODIUM,TAZOBACTAM SODIUM 3.38 G: 3; .375 INJECTION, POWDER, FOR SOLUTION INTRAVENOUS at 22:14

## 2018-12-22 RX ADMIN — METOPROLOL SUCCINATE 12.5 MG: 25 TABLET, EXTENDED RELEASE ORAL at 08:25

## 2018-12-22 RX ADMIN — CALCIUM 500 MG: 500 TABLET ORAL at 08:26

## 2018-12-22 RX ADMIN — ATORVASTATIN CALCIUM 80 MG: 80 TABLET, FILM COATED ORAL at 08:25

## 2018-12-22 RX ADMIN — CALCIUM 500 MG: 500 TABLET ORAL at 22:13

## 2018-12-22 RX ADMIN — INSULIN GLARGINE 15 UNITS: 100 INJECTION, SOLUTION SUBCUTANEOUS at 22:49

## 2018-12-22 RX ADMIN — HYDROMORPHONE HYDROCHLORIDE 1 MG: 1 INJECTION, SOLUTION INTRAMUSCULAR; INTRAVENOUS; SUBCUTANEOUS at 22:24

## 2018-12-22 RX ADMIN — OXYCODONE AND ACETAMINOPHEN 2 TABLET: 5; 325 TABLET ORAL at 03:37

## 2018-12-22 ASSESSMENT — PAIN SCALES - GENERAL
PAINLEVEL_OUTOF10: 8
PAINLEVEL_OUTOF10: 5
PAINLEVEL_OUTOF10: 7
PAINLEVEL_OUTOF10: 8
PAINLEVEL_OUTOF10: 3
PAINLEVEL_OUTOF10: 2
PAINLEVEL_OUTOF10: 7
PAINLEVEL_OUTOF10: 5
PAINLEVEL_OUTOF10: 8
PAINLEVEL_OUTOF10: 5
PAINLEVEL_OUTOF10: 6
PAINLEVEL_OUTOF10: 8
PAINLEVEL_OUTOF10: 4
PAINLEVEL_OUTOF10: 9
PAINLEVEL_OUTOF10: 0
PAINLEVEL_OUTOF10: 6
PAINLEVEL_OUTOF10: 4
PAINLEVEL_OUTOF10: 9

## 2018-12-22 ASSESSMENT — PAIN DESCRIPTION - LOCATION
LOCATION: LEG
LOCATION: LEG

## 2018-12-22 ASSESSMENT — PAIN DESCRIPTION - ORIENTATION
ORIENTATION: LEFT;LOWER
ORIENTATION: LEFT;LOWER

## 2018-12-22 ASSESSMENT — PAIN DESCRIPTION - PAIN TYPE
TYPE: ACUTE PAIN
TYPE: ACUTE PAIN

## 2018-12-22 NOTE — PROGRESS NOTES
12/10/2018    LABMICR YES 12/10/2018    URINETYPE Not Specified 12/10/2018      Urine Microscopic:   Lab Results   Component Value Date    COMU see below 12/10/2018    WBCUA 0-2 12/10/2018    RBCUA 5-10 12/10/2018    EPIU 0-2 12/10/2018     BNP  : 26569     MICRO: cultures reviewed and updated by me   Procedure Component Value Units Date/Time   Tissue Culture [[id number]] (Abnormal) Collected: 12/10/18 1556   Order Status: Completed Specimen: Tissue from Tissue Updated: 12/11/18 1430    Gram Stain Result 3+ Gram negative rods   4+ Gram positive cocci   No WBC's seen    (A)    Organism Gram negative neil (A)    WOUND/ABSCESS --    Moderate growth   ID and sensitivity to follow     Organism Gram negative neil (A)    WOUND/ABSCESS --    Moderate growth   ID and sensitivity to follow   Second colony type    Narrative:     ORDER#: [id number]                          ORDERED BY: Boo Salter  SOURCE: Tissue                             COLLECTED:  12/10/18 15:56  ANTIBIOTICS AT SERGEI. :                      RECEIVED :  12/10/18 16:50  Performed at:  [facility]  [address] [address]   Phone [phone]   Wound Culture [[id number]] (Abnormal) Collected: 12/10/18 0900   Order Status: Completed Specimen: Leg from Leg Updated: 12/11/18 1253    WOUND/ABSCESS --    Gram Stain Result No WBCs or organisms seen    Organism Gram negative neil (A)    WOUND/ABSCESS --    Light growth   ID and sensitivity to follow     Organism Gram negative neil (A)    WOUND/ABSCESS --    Light growth   ID and sensitivity to follow   Second colony type    Narrative:     ORDER#: [id number]                          ORDERED BY: Malou Gonzales  SOURCE: Leg Tendon Left                    COLLECTED:  12/10/18 09:00  ANTIBIOTICS AT SERGEI. :                      RECEIVED :  12/10/18 09:08  Performed at:  [facility]  [address] [address]   Phone [phone] Urine Culture [[id number]] Collected: 12/09/18 0157   Order Status: Completed Specimen: Urine, clean catch Updated: 12/11/18 0840    Urine Culture, Routine No growth at 18-36 hours   Narrative:     ORDER#: [id number]                          ORDERED BY: Tim Bolanos  SOURCE: Urine Clean Catch                  COLLECTED:  12/09/18 01:57  ANTIBIOTICS AT SERGEI. :                      RECEIVED :  12/10/18 02:15  Performed at:  [facility]  [address] [place] [place] [employer] [address]   Phone [phone]   Culture Blood #2 [[id number]] Collected: 12/09/18 2328   Order Status: Completed Specimen: Blood Updated: 12/11/18 0115    Culture, Blood 2 No Growth to date.  Any change in status will be called. Narrative:     ORDER#: [id number]                          ORDERED BY: JONAS HUGHES  SOURCE: Blood Antecubital-Right            COLLECTED:  12/09/18 23:28  ANTIBIOTICS AT SERGEI. :                      RECEIVED :  12/09/18 23:37  If child <=2 yrs old please draw pediatric bottle. ~Blood Culture #2  Performed at:  [facility]  [address] [place], [place] [employer] [address]   Phone [phone]   Culture Blood #1 [[id number]] Collected: 12/09/18 2327   Order Status: Completed Specimen: Blood from Blood Updated: 12/11/18 0115    Blood Culture, Routine No Growth to date.  Any change in status will be called. Narrative:     ORDER#: [id number]                          ORDERED BY: JONAS HUGHES  SOURCE: Blood Antecubital-Right            COLLECTED:  12/09/18 23:27  ANTIBIOTICS AT SERGEI. :                      RECEIVED :  12/09/18 23:36  If child <=2 yrs old please draw pediatric bottle. ~Blood Culture #1  Performed at:  [facility]  [address] [place], [place] [employer] [address]   Phone [phone]   CULTURE BLOOD #1 [[id number]] Collected: 12/10/18 0000   Order Status: Canceled Specimen: Blood from Blood    CULTURE BLOOD #2 [[id number]] Collected: 12/10/18 0000   Order Status: Canceled Specimen: Blood        ENTEROBACTER AEROGENES     Antibiotic Interpretation KALLIE Unit   amoxicillin-clavulanate Resistant >16/8 mcg/mL   ampicillin Resistant >16 mcg/mL   ceFAZolin Resistant >16 mcg/mL   cefTRIAXone Sensitive <=1 mcg/mL   cefepime Sensitive <=2 mcg/mL   cefotaxime Sensitive <=2 mcg/mL   cefuroxime Resistant <=4 mcg/mL   ciprofloxacin Sensitive <=1 mcg/mL   gentamicin Sensitive <=4 mcg/mL   meropenem Sensitive <=1 mcg/mL   piperacillin-tazobactam Sensitive <=16 mcg/mL   trimethoprim-sulfamethoxazole Resistant >2/38 mcg/mL        FINAL DIAGNOSIS:    Left leg wound tissue, debridement:  -Soft tissue with acute inflammation and granulation tissue.  - Negative for malignancy.  MIKE/MIKE    IMAGING:    XR CHEST PORTABLE   Final Result   Appropriate positioning of right PICC line. VL PRE OP VEIN MAPPING   Final Result      XR TIBIA FIBULA LEFT (2 VIEWS)   Final Result   No definite cortical destruction to suggest osteomyelitis. If clinical   suspicion persists, MRI would be of further value.                All the pertinent images and reports for the current Hospitalization were reviewed by me     Scheduled Meds:   insulin glargine  15 Units Subcutaneous Nightly    apixaban  5 mg Oral BID    dolutegravir sodium  50 mg Oral Daily    emtricitabine-tenofovir  1 tablet Oral Nightly    piperacillin-tazobactam  3.375 g Intravenous Q8H    lidocaine 1 % injection  5 mL Intradermal Once    sodium chloride flush  10 mL Intravenous 2 times per day    metoprolol succinate  12.5 mg Oral Daily    insulin lispro  5 Units Subcutaneous TID WC    insulin lispro  0-18 Units Subcutaneous TID WC    insulin lispro  0-9 Units Subcutaneous Nightly    digoxin  125 mcg Oral Daily    calcium elemental  500 mg Oral BID    vitamin D  1,000 Units Oral Daily    atorvastatin  80 mg Oral Daily    montelukast  10 mg Oral Nightly    pantoprazole  40 mg Oral QAM AC Continuous Infusions:   dextrose         PRN Meds:  oxyCODONE-acetaminophen **OR** oxyCODONE-acetaminophen, HYDROmorphone, sodium chloride flush, naloxone, bisacodyl, glucose, dextrose, glucagon (rDNA), dextrose, acetaminophen, diphenhydrAMINE      Assessment:   Left leg necrotic wound infection   Odor and drainage and mixed infectious process on admit   Left leg some concern for Vascular process  CAD with h/o MI  Medical non compliance per  records   Suspect mixed infectious process  Concern for osteomyelitis  HIV on therapy   Diabetic leg infection    Per  records CD4 check last > 700 and he says he is complaint with HIV therapy    GALI      Given the depth of the infection and duration concern for extension down to the bone and s/p OR for debridement and Cx GNR mixed process -Polymicrobial infection    Risk for limb loss high and he understand  willing to comply with recommendations and medical care      S/p Vascular intervention and revascularization femoropopliteal bypass grafting on Left leg  on 12/17      He has some worsening of creat and he does have cardiomyopathy and has Chelsea Sachin BP for a while and has on going trop leak is concerning    Creat now better and will change his HAART therapy and discussed about the regimen    Has wound vac in place and anticipate x long course of IV abx and needs to treat like osteomyelitis given the bone at the base of the wound      Labs, Microbiology, Radiology and all the pertinent results from current hospitalization and  care every where were reviewed  by me as a part of the evaluation   Plan: 1. Creat now trend down  And HAART adjusted   2. Cont local care  - wound vac placed  Pictures reviewed   3. Cont IV Zosyn for mixed infectious process dose reduced to x Q 8 HRS FOR NOW as creat is better for x 4-6 weeks planned out   4. ESR, CRP check repeat done   5. Cont HAART -  Dolutegravir + Truvada for now   6.  S/p Vascular intervention  Completed 7.Will need long course of IV abx given the depth of the infection and bone at the base anticipate x 4-6 weeks of therapy   8. Hopefully able to write ELIAZAR for Monday if no new issues    Discussed with patient/Family  D/w RN     Thanks for allowing me to participate in your patient's care and please call me with any questions or concerns.     Maikel Mullen MD  Infectious Disease  [facility] ([facility]) Physician  Phone: [phone]   Fax : [phone]

## 2018-12-23 LAB
ALBUMIN SERPL-MCNC: 2.7 G/DL (ref 3.4–5)
ANION GAP SERPL CALCULATED.3IONS-SCNC: 11 MMOL/L (ref 3–16)
BILIRUBIN URINE: NEGATIVE
BLOOD, URINE: ABNORMAL
BUN BLDV-MCNC: 24 MG/DL (ref 7–20)
CALCIUM SERPL-MCNC: 8.5 MG/DL (ref 8.3–10.6)
CHLORIDE BLD-SCNC: 97 MMOL/L (ref 99–110)
CLARITY: CLEAR
CO2: 25 MMOL/L (ref 21–32)
COLOR: YELLOW
CREAT SERPL-MCNC: 1 MG/DL (ref 0.9–1.3)
EPITHELIAL CELLS, UA: 0 /HPF (ref 0–5)
GFR AFRICAN AMERICAN: >60
GFR NON-AFRICAN AMERICAN: >60
GLUCOSE BLD-MCNC: 123 MG/DL (ref 70–99)
GLUCOSE BLD-MCNC: 146 MG/DL (ref 70–99)
GLUCOSE BLD-MCNC: 149 MG/DL (ref 70–99)
GLUCOSE BLD-MCNC: 278 MG/DL (ref 70–99)
GLUCOSE BLD-MCNC: 297 MG/DL (ref 70–99)
GLUCOSE URINE: 500 MG/DL
HCT VFR BLD CALC: 28.9 % (ref 40.5–52.5)
HEMOGLOBIN: 9.4 G/DL (ref 13.5–17.5)
HYALINE CASTS: 0 /LPF (ref 0–8)
KETONES, URINE: NEGATIVE MG/DL
LEUKOCYTE ESTERASE, URINE: NEGATIVE
MCH RBC QN AUTO: 27.6 PG (ref 26–34)
MCHC RBC AUTO-ENTMCNC: 32.5 G/DL (ref 31–36)
MCV RBC AUTO: 85 FL (ref 80–100)
MICROSCOPIC EXAMINATION: YES
NITRITE, URINE: NEGATIVE
PDW BLD-RTO: 21.5 % (ref 12.4–15.4)
PERFORMED ON: ABNORMAL
PH UA: 6
PHOSPHORUS: 2.3 MG/DL (ref 2.5–4.9)
PLATELET # BLD: 253 K/UL (ref 135–450)
PMV BLD AUTO: 7.2 FL (ref 5–10.5)
POTASSIUM SERPL-SCNC: 5.3 MMOL/L (ref 3.5–5.1)
PROTEIN UA: 30 MG/DL
RBC # BLD: 3.39 M/UL (ref 4.2–5.9)
RBC UA: 36 /HPF (ref 0–4)
SODIUM BLD-SCNC: 133 MMOL/L (ref 136–145)
SPECIFIC GRAVITY UA: 1.02
URINE TYPE: ABNORMAL
UROBILINOGEN, URINE: 0.2 E.U./DL
WBC # BLD: 6.6 K/UL (ref 4–11)
WBC UA: 1 /HPF (ref 0–5)

## 2018-12-23 PROCEDURE — 85027 COMPLETE CBC AUTOMATED: CPT

## 2018-12-23 PROCEDURE — 6370000000 HC RX 637 (ALT 250 FOR IP): Performed by: SURGERY

## 2018-12-23 PROCEDURE — 87086 URINE CULTURE/COLONY COUNT: CPT

## 2018-12-23 PROCEDURE — 2060000000 HC ICU INTERMEDIATE R&B

## 2018-12-23 PROCEDURE — 6370000000 HC RX 637 (ALT 250 FOR IP): Performed by: INTERNAL MEDICINE

## 2018-12-23 PROCEDURE — 6370000000 HC RX 637 (ALT 250 FOR IP): Performed by: HOSPITALIST

## 2018-12-23 PROCEDURE — 94760 N-INVAS EAR/PLS OXIMETRY 1: CPT

## 2018-12-23 PROCEDURE — 6360000002 HC RX W HCPCS: Performed by: HOSPITALIST

## 2018-12-23 PROCEDURE — 6370000000 HC RX 637 (ALT 250 FOR IP): Performed by: NURSE PRACTITIONER

## 2018-12-23 PROCEDURE — 80069 RENAL FUNCTION PANEL: CPT

## 2018-12-23 PROCEDURE — 81001 URINALYSIS AUTO W/SCOPE: CPT

## 2018-12-23 PROCEDURE — 36592 COLLECT BLOOD FROM PICC: CPT

## 2018-12-23 PROCEDURE — 2580000003 HC RX 258: Performed by: HOSPITALIST

## 2018-12-23 PROCEDURE — 6360000002 HC RX W HCPCS: Performed by: NURSE PRACTITIONER

## 2018-12-23 PROCEDURE — 99024 POSTOP FOLLOW-UP VISIT: CPT | Performed by: SURGERY

## 2018-12-23 PROCEDURE — 2580000003 HC RX 258: Performed by: NURSE PRACTITIONER

## 2018-12-23 RX ORDER — FUROSEMIDE 10 MG/ML
10 INJECTION INTRAMUSCULAR; INTRAVENOUS ONCE
Status: COMPLETED | OUTPATIENT
Start: 2018-12-23 | End: 2018-12-23

## 2018-12-23 RX ADMIN — Medication 10 ML: at 21:40

## 2018-12-23 RX ADMIN — OXYCODONE AND ACETAMINOPHEN 2 TABLET: 5; 325 TABLET ORAL at 21:40

## 2018-12-23 RX ADMIN — HYDROMORPHONE HYDROCHLORIDE 1 MG: 1 INJECTION, SOLUTION INTRAMUSCULAR; INTRAVENOUS; SUBCUTANEOUS at 09:31

## 2018-12-23 RX ADMIN — HYDROMORPHONE HYDROCHLORIDE 1 MG: 1 INJECTION, SOLUTION INTRAMUSCULAR; INTRAVENOUS; SUBCUTANEOUS at 18:28

## 2018-12-23 RX ADMIN — DOLUTEGRAVIR SODIUM 50 MG: 50 TABLET, FILM COATED ORAL at 08:25

## 2018-12-23 RX ADMIN — EMTRICITABINE AND TENOFOVIR DISOPROXIL FUMARATE 1 TABLET: 200; 300 TABLET, FILM COATED ORAL at 21:47

## 2018-12-23 RX ADMIN — DIGOXIN 125 MCG: 125 TABLET ORAL at 08:16

## 2018-12-23 RX ADMIN — APIXABAN 5 MG: 5 TABLET, FILM COATED ORAL at 21:39

## 2018-12-23 RX ADMIN — OXYCODONE AND ACETAMINOPHEN 2 TABLET: 5; 325 TABLET ORAL at 12:41

## 2018-12-23 RX ADMIN — INSULIN LISPRO 9 UNITS: 100 INJECTION, SOLUTION INTRAVENOUS; SUBCUTANEOUS at 08:18

## 2018-12-23 RX ADMIN — INSULIN GLARGINE 15 UNITS: 100 INJECTION, SOLUTION SUBCUTANEOUS at 21:40

## 2018-12-23 RX ADMIN — OXYCODONE AND ACETAMINOPHEN 2 TABLET: 5; 325 TABLET ORAL at 08:16

## 2018-12-23 RX ADMIN — INSULIN LISPRO 5 UNITS: 100 INJECTION, SOLUTION INTRAVENOUS; SUBCUTANEOUS at 17:22

## 2018-12-23 RX ADMIN — PANTOPRAZOLE SODIUM 40 MG: 40 TABLET, DELAYED RELEASE ORAL at 06:20

## 2018-12-23 RX ADMIN — PIPERACILLIN SODIUM,TAZOBACTAM SODIUM 3.38 G: 3; .375 INJECTION, POWDER, FOR SOLUTION INTRAVENOUS at 21:41

## 2018-12-23 RX ADMIN — PIPERACILLIN SODIUM,TAZOBACTAM SODIUM 3.38 G: 3; .375 INJECTION, POWDER, FOR SOLUTION INTRAVENOUS at 06:20

## 2018-12-23 RX ADMIN — VITAMIN D, TAB 1000IU (100/BT) 1000 UNITS: 25 TAB at 08:16

## 2018-12-23 RX ADMIN — PIPERACILLIN SODIUM,TAZOBACTAM SODIUM 3.38 G: 3; .375 INJECTION, POWDER, FOR SOLUTION INTRAVENOUS at 13:41

## 2018-12-23 RX ADMIN — METOPROLOL SUCCINATE 12.5 MG: 25 TABLET, EXTENDED RELEASE ORAL at 08:15

## 2018-12-23 RX ADMIN — INSULIN LISPRO 3 UNITS: 100 INJECTION, SOLUTION INTRAVENOUS; SUBCUTANEOUS at 17:22

## 2018-12-23 RX ADMIN — HYDROMORPHONE HYDROCHLORIDE 1 MG: 1 INJECTION, SOLUTION INTRAMUSCULAR; INTRAVENOUS; SUBCUTANEOUS at 04:41

## 2018-12-23 RX ADMIN — INSULIN LISPRO 5 UNITS: 100 INJECTION, SOLUTION INTRAVENOUS; SUBCUTANEOUS at 08:19

## 2018-12-23 RX ADMIN — CALCIUM 500 MG: 500 TABLET ORAL at 21:39

## 2018-12-23 RX ADMIN — ATORVASTATIN CALCIUM 80 MG: 80 TABLET, FILM COATED ORAL at 08:15

## 2018-12-23 RX ADMIN — FUROSEMIDE 10 MG: 10 INJECTION, SOLUTION INTRAMUSCULAR; INTRAVENOUS at 09:35

## 2018-12-23 RX ADMIN — OXYCODONE AND ACETAMINOPHEN 2 TABLET: 5; 325 TABLET ORAL at 17:21

## 2018-12-23 RX ADMIN — MONTELUKAST SODIUM 10 MG: 10 TABLET, COATED ORAL at 21:39

## 2018-12-23 RX ADMIN — APIXABAN 5 MG: 5 TABLET, FILM COATED ORAL at 08:16

## 2018-12-23 RX ADMIN — CALCIUM 500 MG: 500 TABLET ORAL at 08:15

## 2018-12-23 RX ADMIN — HYDROMORPHONE HYDROCHLORIDE 1 MG: 1 INJECTION, SOLUTION INTRAMUSCULAR; INTRAVENOUS; SUBCUTANEOUS at 01:41

## 2018-12-23 ASSESSMENT — PAIN SCALES - GENERAL
PAINLEVEL_OUTOF10: 9
PAINLEVEL_OUTOF10: 6
PAINLEVEL_OUTOF10: 9
PAINLEVEL_OUTOF10: 3
PAINLEVEL_OUTOF10: 10
PAINLEVEL_OUTOF10: 9
PAINLEVEL_OUTOF10: 6
PAINLEVEL_OUTOF10: 0
PAINLEVEL_OUTOF10: 9

## 2018-12-23 ASSESSMENT — PAIN DESCRIPTION - LOCATION
LOCATION: LEG;SCROTUM
LOCATION: LEG
LOCATION: LEG;SCROTUM

## 2018-12-23 ASSESSMENT — PAIN DESCRIPTION - ORIENTATION
ORIENTATION: LEFT;LOWER

## 2018-12-23 ASSESSMENT — PAIN DESCRIPTION - PAIN TYPE
TYPE: ACUTE PAIN

## 2018-12-23 NOTE — PROGRESS NOTES
VASCULAR                 POD#5     Reasonably comfortable.     VSS     afeb                 Good UO  VAC intact with good suction  Incisions intact - slight serous drainage. Good pedal doppler signals - unable to feel pulses due to edema and woody changes.     A/P:     S/P L fem-AKpop. Patent. S/P calf debridement - VAC in place. Continue antibx. PT/OT and wt bearing as tolerated.               No plans for DC in the short term due to care needed in an attempt for limb salvage.                 Rick Mcgarry

## 2018-12-23 NOTE — PROGRESS NOTES
Hospitalist Progress Note      PCP: Ruth Acosta MD    Date of Admission: 12/9/2018    Chief Complaint: chronic L leg ulcer    Hospital Course: Admitted with chronic l leg ucler/infection, with PAD, s/p revascularization. Subjective: patient is seen and examined on room air, no distress. Complains of dysuria, no hesitancy. Edema improved, no LH, dizziness, fevers, chills, uncontrolled pain.     Medications:  Reviewed    Infusion Medications    dextrose       Scheduled Medications    furosemide  10 mg Intravenous Once    insulin glargine  15 Units Subcutaneous Nightly    apixaban  5 mg Oral BID    dolutegravir sodium  50 mg Oral Daily    emtricitabine-tenofovir  1 tablet Oral Nightly    piperacillin-tazobactam  3.375 g Intravenous Q8H    lidocaine 1 % injection  5 mL Intradermal Once    sodium chloride flush  10 mL Intravenous 2 times per day    metoprolol succinate  12.5 mg Oral Daily    insulin lispro  5 Units Subcutaneous TID WC    insulin lispro  0-18 Units Subcutaneous TID WC    insulin lispro  0-9 Units Subcutaneous Nightly    digoxin  125 mcg Oral Daily    calcium elemental  500 mg Oral BID    vitamin D  1,000 Units Oral Daily    atorvastatin  80 mg Oral Daily    montelukast  10 mg Oral Nightly    pantoprazole  40 mg Oral QAM AC     PRN Meds: oxyCODONE-acetaminophen **OR** oxyCODONE-acetaminophen, HYDROmorphone, sodium chloride flush, naloxone, bisacodyl, glucose, dextrose, glucagon (rDNA), dextrose, acetaminophen, diphenhydrAMINE      Intake/Output Summary (Last 24 hours) at 12/23/18 0848  Last data filed at 12/23/18 9798   Gross per 24 hour   Intake              980 ml   Output             4700 ml   Net            -3720 ml       Physical Exam Performed:    /79   Pulse 76   Temp 98.2 °F (36.8 °C) (Oral)   Resp 16   Ht 6' 2.02\" (1.88 m)   Wt 206 lb 2.1 oz (93.5 kg) Comment: notified rn Alex Lau, pt has staples on left leg  SpO2 99%   BMI 26.45 kg/m²

## 2018-12-24 LAB
ALBUMIN SERPL-MCNC: 2.4 G/DL (ref 3.4–5)
ALP BLD-CCNC: 63 U/L (ref 40–129)
ALT SERPL-CCNC: 14 U/L (ref 10–40)
ANION GAP SERPL CALCULATED.3IONS-SCNC: 10 MMOL/L (ref 3–16)
AST SERPL-CCNC: 18 U/L (ref 15–37)
BASOPHILS ABSOLUTE: 0 K/UL (ref 0–0.2)
BASOPHILS RELATIVE PERCENT: 0.6 %
BILIRUB SERPL-MCNC: 0.4 MG/DL (ref 0–1)
BILIRUBIN DIRECT: <0.2 MG/DL (ref 0–0.3)
BILIRUBIN, INDIRECT: ABNORMAL MG/DL (ref 0–1)
BUN BLDV-MCNC: 27 MG/DL (ref 7–20)
CALCIUM SERPL-MCNC: 8.5 MG/DL (ref 8.3–10.6)
CHLORIDE BLD-SCNC: 100 MMOL/L (ref 99–110)
CO2: 24 MMOL/L (ref 21–32)
CREAT SERPL-MCNC: 0.9 MG/DL (ref 0.9–1.3)
EOSINOPHILS ABSOLUTE: 0.1 K/UL (ref 0–0.6)
EOSINOPHILS RELATIVE PERCENT: 1.8 %
GFR AFRICAN AMERICAN: >60
GFR NON-AFRICAN AMERICAN: >60
GLUCOSE BLD-MCNC: 110 MG/DL (ref 70–99)
GLUCOSE BLD-MCNC: 185 MG/DL (ref 70–99)
GLUCOSE BLD-MCNC: 203 MG/DL (ref 70–99)
GLUCOSE BLD-MCNC: 208 MG/DL (ref 70–99)
GLUCOSE BLD-MCNC: 77 MG/DL (ref 70–99)
HCT VFR BLD CALC: 28.7 % (ref 40.5–52.5)
HEMOGLOBIN: 9.1 G/DL (ref 13.5–17.5)
LYMPHOCYTES ABSOLUTE: 0.9 K/UL (ref 1–5.1)
LYMPHOCYTES RELATIVE PERCENT: 14.5 %
MAGNESIUM: 1.6 MG/DL (ref 1.8–2.4)
MCH RBC QN AUTO: 26.9 PG (ref 26–34)
MCHC RBC AUTO-ENTMCNC: 31.7 G/DL (ref 31–36)
MCV RBC AUTO: 84.8 FL (ref 80–100)
MONOCYTES ABSOLUTE: 0.7 K/UL (ref 0–1.3)
MONOCYTES RELATIVE PERCENT: 11.9 %
NEUTROPHILS ABSOLUTE: 4.4 K/UL (ref 1.7–7.7)
NEUTROPHILS RELATIVE PERCENT: 71.2 %
PDW BLD-RTO: 22.2 % (ref 12.4–15.4)
PERFORMED ON: ABNORMAL
PERFORMED ON: NORMAL
PHOSPHORUS: 2.3 MG/DL (ref 2.5–4.9)
PLATELET # BLD: 226 K/UL (ref 135–450)
PMV BLD AUTO: 7.3 FL (ref 5–10.5)
POTASSIUM SERPL-SCNC: 4.9 MMOL/L (ref 3.5–5.1)
RBC # BLD: 3.38 M/UL (ref 4.2–5.9)
REASON FOR REJECTION: NORMAL
REJECTED TEST: NORMAL
SODIUM BLD-SCNC: 134 MMOL/L (ref 136–145)
TOTAL PROTEIN: 6.3 G/DL (ref 6.4–8.2)
URINE CULTURE, ROUTINE: NORMAL
WBC # BLD: 6.2 K/UL (ref 4–11)

## 2018-12-24 PROCEDURE — 6370000000 HC RX 637 (ALT 250 FOR IP): Performed by: SURGERY

## 2018-12-24 PROCEDURE — 36592 COLLECT BLOOD FROM PICC: CPT

## 2018-12-24 PROCEDURE — 6360000002 HC RX W HCPCS: Performed by: NURSE PRACTITIONER

## 2018-12-24 PROCEDURE — 85025 COMPLETE CBC W/AUTO DIFF WBC: CPT

## 2018-12-24 PROCEDURE — 99024 POSTOP FOLLOW-UP VISIT: CPT | Performed by: SURGERY

## 2018-12-24 PROCEDURE — 80048 BASIC METABOLIC PNL TOTAL CA: CPT

## 2018-12-24 PROCEDURE — 6360000002 HC RX W HCPCS: Performed by: HOSPITALIST

## 2018-12-24 PROCEDURE — 6370000000 HC RX 637 (ALT 250 FOR IP): Performed by: INTERNAL MEDICINE

## 2018-12-24 PROCEDURE — 2060000000 HC ICU INTERMEDIATE R&B

## 2018-12-24 PROCEDURE — 99232 SBSQ HOSP IP/OBS MODERATE 35: CPT | Performed by: INTERNAL MEDICINE

## 2018-12-24 PROCEDURE — 94760 N-INVAS EAR/PLS OXIMETRY 1: CPT

## 2018-12-24 PROCEDURE — 6370000000 HC RX 637 (ALT 250 FOR IP): Performed by: HOSPITALIST

## 2018-12-24 PROCEDURE — 80076 HEPATIC FUNCTION PANEL: CPT

## 2018-12-24 PROCEDURE — 83735 ASSAY OF MAGNESIUM: CPT

## 2018-12-24 PROCEDURE — 84100 ASSAY OF PHOSPHORUS: CPT

## 2018-12-24 PROCEDURE — 6370000000 HC RX 637 (ALT 250 FOR IP): Performed by: NURSE PRACTITIONER

## 2018-12-24 PROCEDURE — 2580000003 HC RX 258: Performed by: NURSE PRACTITIONER

## 2018-12-24 PROCEDURE — 2580000003 HC RX 258: Performed by: HOSPITALIST

## 2018-12-24 RX ADMIN — CALCIUM 500 MG: 500 TABLET ORAL at 20:43

## 2018-12-24 RX ADMIN — DIGOXIN 125 MCG: 125 TABLET ORAL at 08:49

## 2018-12-24 RX ADMIN — PIPERACILLIN SODIUM,TAZOBACTAM SODIUM 3.38 G: 3; .375 INJECTION, POWDER, FOR SOLUTION INTRAVENOUS at 06:00

## 2018-12-24 RX ADMIN — PANTOPRAZOLE SODIUM 40 MG: 40 TABLET, DELAYED RELEASE ORAL at 06:01

## 2018-12-24 RX ADMIN — OXYCODONE AND ACETAMINOPHEN 2 TABLET: 5; 325 TABLET ORAL at 09:41

## 2018-12-24 RX ADMIN — VITAMIN D, TAB 1000IU (100/BT) 1000 UNITS: 25 TAB at 08:49

## 2018-12-24 RX ADMIN — ATORVASTATIN CALCIUM 80 MG: 80 TABLET, FILM COATED ORAL at 08:49

## 2018-12-24 RX ADMIN — APIXABAN 5 MG: 5 TABLET, FILM COATED ORAL at 20:43

## 2018-12-24 RX ADMIN — MONTELUKAST SODIUM 10 MG: 10 TABLET, COATED ORAL at 20:43

## 2018-12-24 RX ADMIN — INSULIN LISPRO 5 UNITS: 100 INJECTION, SOLUTION INTRAVENOUS; SUBCUTANEOUS at 09:00

## 2018-12-24 RX ADMIN — CALCIUM 500 MG: 500 TABLET ORAL at 08:49

## 2018-12-24 RX ADMIN — Medication 10 ML: at 20:43

## 2018-12-24 RX ADMIN — PIPERACILLIN SODIUM,TAZOBACTAM SODIUM 3.38 G: 3; .375 INJECTION, POWDER, FOR SOLUTION INTRAVENOUS at 21:07

## 2018-12-24 RX ADMIN — EMTRICITABINE AND TENOFOVIR DISOPROXIL FUMARATE 1 TABLET: 200; 300 TABLET, FILM COATED ORAL at 20:43

## 2018-12-24 RX ADMIN — OXYCODONE AND ACETAMINOPHEN 2 TABLET: 5; 325 TABLET ORAL at 18:56

## 2018-12-24 RX ADMIN — INSULIN GLARGINE 15 UNITS: 100 INJECTION, SOLUTION SUBCUTANEOUS at 20:51

## 2018-12-24 RX ADMIN — DOLUTEGRAVIR SODIUM 50 MG: 50 TABLET, FILM COATED ORAL at 09:42

## 2018-12-24 RX ADMIN — Medication 10 ML: at 09:04

## 2018-12-24 RX ADMIN — OXYCODONE AND ACETAMINOPHEN 2 TABLET: 5; 325 TABLET ORAL at 06:01

## 2018-12-24 RX ADMIN — OXYCODONE AND ACETAMINOPHEN 2 TABLET: 5; 325 TABLET ORAL at 13:35

## 2018-12-24 RX ADMIN — METOPROLOL SUCCINATE 12.5 MG: 25 TABLET, EXTENDED RELEASE ORAL at 08:49

## 2018-12-24 RX ADMIN — HYDROMORPHONE HYDROCHLORIDE 1 MG: 1 INJECTION, SOLUTION INTRAMUSCULAR; INTRAVENOUS; SUBCUTANEOUS at 21:08

## 2018-12-24 RX ADMIN — HYDROMORPHONE HYDROCHLORIDE 1 MG: 1 INJECTION, SOLUTION INTRAMUSCULAR; INTRAVENOUS; SUBCUTANEOUS at 17:10

## 2018-12-24 RX ADMIN — HYDROMORPHONE HYDROCHLORIDE 1 MG: 1 INJECTION, SOLUTION INTRAMUSCULAR; INTRAVENOUS; SUBCUTANEOUS at 08:49

## 2018-12-24 RX ADMIN — HYDROMORPHONE HYDROCHLORIDE 1 MG: 1 INJECTION, SOLUTION INTRAMUSCULAR; INTRAVENOUS; SUBCUTANEOUS at 13:03

## 2018-12-24 RX ADMIN — APIXABAN 5 MG: 5 TABLET, FILM COATED ORAL at 08:49

## 2018-12-24 RX ADMIN — INSULIN LISPRO 3 UNITS: 100 INJECTION, SOLUTION INTRAVENOUS; SUBCUTANEOUS at 20:51

## 2018-12-24 RX ADMIN — OXYCODONE AND ACETAMINOPHEN 2 TABLET: 5; 325 TABLET ORAL at 23:11

## 2018-12-24 RX ADMIN — HYDROMORPHONE HYDROCHLORIDE 1 MG: 1 INJECTION, SOLUTION INTRAMUSCULAR; INTRAVENOUS; SUBCUTANEOUS at 00:19

## 2018-12-24 RX ADMIN — INSULIN LISPRO 6 UNITS: 100 INJECTION, SOLUTION INTRAVENOUS; SUBCUTANEOUS at 08:50

## 2018-12-24 RX ADMIN — PIPERACILLIN SODIUM,TAZOBACTAM SODIUM 3.38 G: 3; .375 INJECTION, POWDER, FOR SOLUTION INTRAVENOUS at 14:47

## 2018-12-24 ASSESSMENT — PAIN SCALES - GENERAL
PAINLEVEL_OUTOF10: 7
PAINLEVEL_OUTOF10: 5
PAINLEVEL_OUTOF10: 5
PAINLEVEL_OUTOF10: 9
PAINLEVEL_OUTOF10: 9
PAINLEVEL_OUTOF10: 7
PAINLEVEL_OUTOF10: 7
PAINLEVEL_OUTOF10: 10
PAINLEVEL_OUTOF10: 9
PAINLEVEL_OUTOF10: 7
PAINLEVEL_OUTOF10: 7
PAINLEVEL_OUTOF10: 9
PAINLEVEL_OUTOF10: 10
PAINLEVEL_OUTOF10: 10
PAINLEVEL_OUTOF10: 5
PAINLEVEL_OUTOF10: 6
PAINLEVEL_OUTOF10: 9
PAINLEVEL_OUTOF10: 9

## 2018-12-24 ASSESSMENT — PAIN DESCRIPTION - PROGRESSION: CLINICAL_PROGRESSION: NOT CHANGED

## 2018-12-24 ASSESSMENT — PAIN DESCRIPTION - ORIENTATION: ORIENTATION: LEFT

## 2018-12-24 ASSESSMENT — PAIN DESCRIPTION - ONSET: ONSET: ON-GOING

## 2018-12-24 ASSESSMENT — PAIN DESCRIPTION - LOCATION: LOCATION: LEG;SCROTUM

## 2018-12-24 ASSESSMENT — PAIN DESCRIPTION - DESCRIPTORS: DESCRIPTORS: DISCOMFORT;ACHING

## 2018-12-24 ASSESSMENT — PAIN DESCRIPTION - FREQUENCY: FREQUENCY: CONTINUOUS

## 2018-12-24 NOTE — PROGRESS NOTES
Status: Full Code    PT/OT Eval Status: called and recommend snf/ltac. This chart was generated in part by using Dragon Dictation system and may contain errors related to that system including errors in grammar, punctuation, and spelling, as well as words and phrases that may be inappropriate. When dictating, effort is made to correct spelling/grammar errors. If there are any questions or concerns please feel free to contact the dictating provider for clarification. I have discussed the above stated plan with the patient  and they verbalized understanding and agreed with the plan. RN notified about todays plan  bed side. Dispo: will monitor, Discharge in few  days to home/snf . Needs to stay in hospital for iv abx, poss vas surg eval .   Barry Coppola MD  [phone]

## 2018-12-24 NOTE — PROGRESS NOTES
Nutrition Assessment    Type and Reason for Visit: Reassess    Nutrition Recommendations:   Continue Glucerna & Magic Cup bid. Nutrition Assessment: Pt with continued improved po intake but remains at risk for nutrition compromise r/t increased nutrient needs r/t surgery, healing of altered skin integrity & presence of HIV. Pt reported acceptance of chocolate Glucerna & Magic Cup bid. Malnutrition Assessment:  · Malnutrition Status: At risk for malnutrition  · Context: Chronic illness  · Findings of the 6 clinical characteristics of malnutrition (Minimum of 2 out of 6 clinical characteristics is required to make the diagnosis of moderate or severe Protein Calorie Malnutrition based on AND/ASPEN Guidelines):  1. Energy Intake-Less than 75% of estimated energy requirement for greater than 7 days, not able to assess    2. Weight Loss-Unable to assess (Pt is unsure of UBW), unable to assess  3. Fat Loss-No significant subcutaneous fat loss,    4. Muscle Loss-No significant muscle mass loss,    5. Fluid Accumulation-Mild fluid accumulation, Extremities  6.  Strength-Not measured    Nutrition Risk Level: Moderate    Nutrient Needs:  · Estimated Daily Total Kcal: 2772-3241 kcal/day (based on 30-35 kcal/kg ABW)   · Estimated Daily Protein (g):  gm (1-1.4 gm protein/kg IBW)  · Estimated Daily Total Fluid (ml/day): 1 ml/1 kcal or per MD order    Nutrition Diagnosis:   · Problem: Increased nutrient needs  · Etiology: related to Increased demand for energy/nutrients     Signs and symptoms:  as evidenced by Presence of wounds    Objective Information:  · Nutrition-Focused Physical Findings: BM 12/23. Noted +1 edema to RLE & +2 edema to LLE.   · Wound Type:  (Tx continues to multiple wounds to lower extremities with continued attempt to salvage LLE limb)  · Current Nutrition Therapies:  · Oral Diet Orders: Carb Control 5 Carbs/Meal   · Oral Diet intake: 51-75%, %  · Oral Nutrition Supplement (ONS)

## 2018-12-24 NOTE — PLAN OF CARE
Problem: Nutrition  Goal: Optimal nutrition therapy  Outcome: Ongoing  Nutrition Problem: Increased nutrient needs  Intervention: Food and/or Nutrient Delivery: Continue current diet, Continue current ONS  Nutritional Goals:  Tolerate most appropriate form of nutrition
Problem: Pain:  Goal: Control of acute pain  Control of acute pain   Outcome: Ongoing  Continuing to monitor pain and discomfort. Monitoring pain level on scale of 0-10. Non- pharmacological measures encouraged to reduce discomfort/pain. PRN pain meds administeration continues as ordered by physician. Problem: Falls - Risk of:  Goal: Will remain free from falls  Will remain free from falls   Outcome: Ongoing  Fall risk precautions in place. Call light within reach; pt uses call light appropriately. Frequent visual monitoring in place q1h. Bed alarm activated.
Problem: Pain:  Goal: Pain level will decrease  Pain level will decrease   Outcome: Ongoing  Educated patient on pain management. Will assess patients pain level per unit protocol, and provide pain management measures as needed. Problem: Risk for Impaired Skin Integrity  Goal: Tissue integrity - skin and mucous membranes  Structural intactness and normal physiological function of skin and  mucous membranes. Outcome: Ongoing  Will assess skin every shift and reposition if unable to do so independently    Problem: Falls - Risk of:  Goal: Will remain free from falls  Will remain free from falls   Outcome: Ongoing  Fall risk assessment completed. Fall precautions in place. Call light within reach. Pt educated on calling for assistance before getting up. Walkway free of clutter. Will continue to monitor. Goal: Absence of physical injury  Absence of physical injury   Outcome: Ongoing  Pt is free of injury. No injury noted. Fall precautions in place. Call light within reach. Will monitor.
Problem: Pain:  Goal: Pain level will decrease  Pain level will decrease   Outcome: Ongoing  Pain level will decrease. Electronically signed by Mitul Patel RN on 12/11/2018 at 7:48 AM    Goal: Control of acute pain  Control of acute pain   Outcome: Ongoing  Control of acute pain. Electronically signed by Mitul Patel RN on 12/11/2018 at 7:49 AM    Goal: Control of chronic pain  Control of chronic pain   Outcome: Ongoing  Control of chronic pain. Electronically signed by Mitul Patel RN on 12/11/2018 at 7:49 AM      Problem: Risk for Impaired Skin Integrity  Goal: Tissue integrity - skin and mucous membranes  Structural intactness and normal physiological function of skin and  mucous membranes. Outcome: Ongoing  Structural intactness and normal physiological function of skin and mucous membranes. Electronically signed by Mitul Patel RN on 12/11/2018 at 7:49 AM      Problem: Nutrition  Goal: Optimal nutrition therapy  Outcome: Ongoing  Optimal nutrition therapy. Electronically signed by Mitul Patel RN on 12/11/2018 at 7:50 AM      Problem: Falls - Risk of:  Goal: Will remain free from falls  Will remain free from falls   Outcome: Ongoing  Will remain free from falls. Electronically signed by Mitul Patel RN on 12/11/2018 at 7:50 AM    Goal: Absence of physical injury  Absence of physical injury   Outcome: Ongoing  Absence of physical injury.   Electronically signed by Mitul Patel RN on 12/11/2018 at 7:50 AM
Problem: Pain:  Goal: Pain level will decrease  Pain level will decrease   Outcome: Ongoing  Pt. Pain level assessed. Patient describes pain using 0-10 pain scale. Patient educated on pain management and notifying the nurse of pain. Patient pain managed with PRN analgesic per MD order. Problem: Risk for Impaired Skin Integrity  Goal: Tissue integrity - skin and mucous membranes  Structural intactness and normal physiological function of skin and  mucous membranes. Outcome: Ongoing  Shift skin assessment completed. Patient assessed for incontinence and barrier cream PRN if needed. Patient encouraged to turn and reposition every 2 hours. Patient assisted if unable to do so themselves. Problem: Falls - Risk of:  Goal: Will remain free from falls  Will remain free from falls   Outcome: Ongoing  Fall precautions in place. Bed wheels locked, bed in lowest position, call light within reach, fall armband on, non-skid footwear applied, bed alarm on. Patient is aware to call nurse for assistance before getting up.     Problem: HEMODYNAMIC STATUS  Goal: Patient has stable vital signs and fluid balance  Outcome: Ongoing      Problem: ACTIVITY INTOLERANCE/IMPAIRED MOBILITY  Goal: Mobility/activity is maintained at optimum level for patient  Outcome: Ongoing
Problem: Pain:  Goal: Pain level will decrease  Pain level will decrease   Outcome: Ongoing  Pt. Pain level assessed. Patient describes pain using 0-10 pain scale. Patient educated on pain management and notifying the nurse of pain. Patient pain managed with PRN analgesic per MD order. Problem: Risk for Impaired Skin Integrity  Goal: Tissue integrity - skin and mucous membranes  Structural intactness and normal physiological function of skin and  mucous membranes. Outcome: Ongoing  Shift skin assessment completed. Patient encouraged to turn and reposition every 2 hours. Patient assisted if unable to do so themselves. Problem: Falls - Risk of:  Goal: Will remain free from falls  Will remain free from falls   Outcome: Ongoing  Fall precautions in place. Bed wheels locked, bed in lowest position, call light within reach, non-skid footwear applied, bed alarm on. Patient is aware to call nurse for assistance before getting up.     Problem: HEMODYNAMIC STATUS  Goal: Patient has stable vital signs and fluid balance  Outcome: Ongoing      Problem: ACTIVITY INTOLERANCE/IMPAIRED MOBILITY  Goal: Mobility/activity is maintained at optimum level for patient  Outcome: Ongoing
Problem: Pain:  Goal: Pain level will decrease  Pain level will decrease   Outcome: Ongoing  Verbalizing relief from prn analgesics. Problem: Risk for Impaired Skin Integrity  Goal: Tissue integrity - skin and mucous membranes  Structural intactness and normal physiological function of skin and  mucous membranes. Outcome: Ongoing  Wound dressing intact. Problem: HEMODYNAMIC STATUS  Goal: Patient has stable vital signs and fluid balance  Outcome: Ongoing  Daily weights per staff.
today.\"

## 2018-12-24 NOTE — PROGRESS NOTES
TOPICALLY TWICE A DAY      pantoprazole (PROTONIX) 40 MG tablet Take 40 mg by mouth daily       promethazine (PHENERGAN) 25 MG tablet TAKE 1 TABLET BY MOUTH TWICE A DAY AS NEEDED FOR NAUSEA.  Simethicone 125 MG CAPS Take 1 tablet by mouth      traMADol (ULTRAM) 50 MG tablet Take 50 mg by mouth every 6 hours as needed. Sevilla Bird zolpidem (AMBIEN) 10 MG tablet Take 10 mg by mouth nightly as needed. .         Allergies:  Patient has no known allergies. Immunizations :   Immunization History   Administered Date(s) Administered    Influenza, Quadv, 6 mo and older, IM, PF (Flulaval, Fluarix) 12/11/2018       Social History:    Social History   Substance Use Topics    Smoking status: Never Smoker    Smokeless tobacco: Never Used    Alcohol use No     History   Smoking Status    Never Smoker   Smokeless Tobacco    Never Used      Family History : no DVT no COPD       REVIEW OF SYSTEMS:    No fever / chills / sweats. No weight loss. No visual change, eye pain, eye discharge. No oral lesion, sore throat, dysphagia. Denies cough / sputum/Sob   Denies chest pain, palpitations/ dizziness  Denies nausea/ vomiting/abdominal pain/diarrhea. Denies dysuria or change in urinary function. Denies joint swelling or pain. No myalgia, arthralgia. No rashes, skin lesions   Denies focal weakness, sensory change or other neurologic symptoms  No lymph node swelling or tenderness.     Left leg pain and swelling +  And Left groin pain+ scrotal edema +     PHYSICAL EXAM:      Vitals:    /81   Pulse 97   Temp 98 °F (36.7 °C) (Oral)   Resp 19   Ht 6' 2.02\" (1.88 m)   Wt 207 lb 3.7 oz (94 kg)   SpO2 98%   BMI 26.60 kg/m²   General Appearance: alert,in no acute distress, +  pallor, no icterus chronically ill appearing man + poor dentition on nasal cannula    Skin: warm and dry, no rash or erythema  Head: normocephalic and atraumatic  Eyes: pupils equal, round, and reactive to light, conjunctivae normal  ENT: tympanic membrane, external ear and ear canal normal bilaterally, nose without deformity, nasal mucosa and turbinates normal without polyps  Neck: supple and non-tender without mass, no thyromegaly  no cervical lymphadenopathy  Pulmonary/Chest: clear to auscultation bilaterally- no wheezes, rales or rhonchi, normal air movement, no respiratory distress  Cardiovascular: A fib S1 and S2, no murmurs, rubs, clicks, or gallops, no carotid bruits  Abdomen: soft, non-tender, non-distended, normal bowel sounds, no masses or organomegaly  Extremities: no cyanosis, clubbing or + edema  Musculoskeletal: normal range of motion, no joint swelling, deformity or tenderness  Neurologic: reflexes normal and symmetric, no cranial nerve deficit  Psych:  Orientation, sensorium, mood normal  Lines:  PICC  Left leg  dressing+ skin  changes from PVD+ wound vac+ local edema   Rt leg punched out ulcers on the leg tibial area  healing slowly and edema going down    Left groin dressing+  Scrotal edema+       Data Review:    Lab Results   Component Value Date    WBC 6.2 12/24/2018    HGB 9.1 (L) 12/24/2018    HCT 28.7 (L) 12/24/2018    MCV 84.8 12/24/2018     12/24/2018     Lab Results   Component Value Date    CREATININE 0.9 12/24/2018    BUN 27 (H) 12/24/2018     (L) 12/24/2018    K 4.9 12/24/2018     12/24/2018    CO2 24 12/24/2018       Hepatic Function Panel:   Lab Results   Component Value Date    ALKPHOS 63 12/24/2018    ALT 14 12/24/2018    AST 18 12/24/2018    PROT 6.3 12/24/2018    BILITOT 0.4 12/24/2018    BILIDIR <0.2 12/24/2018    IBILI see below 12/24/2018    LABALBU 2.4 12/24/2018       UA:  Lab Results   Component Value Date    COLORU YELLOW 12/23/2018    CLARITYU Clear 12/23/2018    GLUCOSEU 500 12/23/2018    BILIRUBINUR Negative 12/23/2018    KETUA Negative 12/23/2018    SPECGRAV 1.018 12/23/2018    BLOODU MODERATE 12/23/2018    PHUR 6.0 12/23/2018    PROTEINU 30 12/23/2018    UROBILINOGEN 0.2 12/23/2018

## 2018-12-25 LAB
GLUCOSE BLD-MCNC: 138 MG/DL (ref 70–99)
GLUCOSE BLD-MCNC: 206 MG/DL (ref 70–99)
GLUCOSE BLD-MCNC: 240 MG/DL (ref 70–99)
GLUCOSE BLD-MCNC: 75 MG/DL (ref 70–99)
PERFORMED ON: ABNORMAL
PERFORMED ON: NORMAL

## 2018-12-25 PROCEDURE — 6360000002 HC RX W HCPCS: Performed by: HOSPITALIST

## 2018-12-25 PROCEDURE — 6370000000 HC RX 637 (ALT 250 FOR IP): Performed by: HOSPITALIST

## 2018-12-25 PROCEDURE — 6370000000 HC RX 637 (ALT 250 FOR IP): Performed by: INTERNAL MEDICINE

## 2018-12-25 PROCEDURE — 99232 SBSQ HOSP IP/OBS MODERATE 35: CPT | Performed by: INTERNAL MEDICINE

## 2018-12-25 PROCEDURE — 6370000000 HC RX 637 (ALT 250 FOR IP): Performed by: NURSE PRACTITIONER

## 2018-12-25 PROCEDURE — 1200000000 HC SEMI PRIVATE

## 2018-12-25 PROCEDURE — 2580000003 HC RX 258: Performed by: NURSE PRACTITIONER

## 2018-12-25 PROCEDURE — 6360000002 HC RX W HCPCS: Performed by: NURSE PRACTITIONER

## 2018-12-25 PROCEDURE — 2060000000 HC ICU INTERMEDIATE R&B

## 2018-12-25 PROCEDURE — 99024 POSTOP FOLLOW-UP VISIT: CPT | Performed by: SURGERY

## 2018-12-25 PROCEDURE — 2580000003 HC RX 258: Performed by: HOSPITALIST

## 2018-12-25 PROCEDURE — 6370000000 HC RX 637 (ALT 250 FOR IP): Performed by: SURGERY

## 2018-12-25 RX ADMIN — ATORVASTATIN CALCIUM 80 MG: 80 TABLET, FILM COATED ORAL at 08:42

## 2018-12-25 RX ADMIN — Medication 10 ML: at 19:50

## 2018-12-25 RX ADMIN — OXYCODONE AND ACETAMINOPHEN 2 TABLET: 5; 325 TABLET ORAL at 14:16

## 2018-12-25 RX ADMIN — OXYCODONE AND ACETAMINOPHEN 2 TABLET: 5; 325 TABLET ORAL at 04:07

## 2018-12-25 RX ADMIN — DIGOXIN 125 MCG: 125 TABLET ORAL at 08:42

## 2018-12-25 RX ADMIN — HYDROMORPHONE HYDROCHLORIDE 1 MG: 1 INJECTION, SOLUTION INTRAMUSCULAR; INTRAVENOUS; SUBCUTANEOUS at 16:05

## 2018-12-25 RX ADMIN — CALCIUM 500 MG: 500 TABLET ORAL at 19:49

## 2018-12-25 RX ADMIN — OXYCODONE AND ACETAMINOPHEN 2 TABLET: 5; 325 TABLET ORAL at 18:13

## 2018-12-25 RX ADMIN — PIPERACILLIN SODIUM,TAZOBACTAM SODIUM 3.38 G: 3; .375 INJECTION, POWDER, FOR SOLUTION INTRAVENOUS at 12:39

## 2018-12-25 RX ADMIN — OXYCODONE AND ACETAMINOPHEN 2 TABLET: 5; 325 TABLET ORAL at 22:24

## 2018-12-25 RX ADMIN — HYDROMORPHONE HYDROCHLORIDE 1 MG: 1 INJECTION, SOLUTION INTRAMUSCULAR; INTRAVENOUS; SUBCUTANEOUS at 12:39

## 2018-12-25 RX ADMIN — APIXABAN 5 MG: 5 TABLET, FILM COATED ORAL at 19:49

## 2018-12-25 RX ADMIN — INSULIN LISPRO 3 UNITS: 100 INJECTION, SOLUTION INTRAVENOUS; SUBCUTANEOUS at 21:54

## 2018-12-25 RX ADMIN — HYDROMORPHONE HYDROCHLORIDE 1 MG: 1 INJECTION, SOLUTION INTRAMUSCULAR; INTRAVENOUS; SUBCUTANEOUS at 23:58

## 2018-12-25 RX ADMIN — CALCIUM 500 MG: 500 TABLET ORAL at 08:42

## 2018-12-25 RX ADMIN — PIPERACILLIN SODIUM,TAZOBACTAM SODIUM 3.38 G: 3; .375 INJECTION, POWDER, FOR SOLUTION INTRAVENOUS at 21:53

## 2018-12-25 RX ADMIN — METOPROLOL SUCCINATE 12.5 MG: 25 TABLET, EXTENDED RELEASE ORAL at 08:42

## 2018-12-25 RX ADMIN — HYDROMORPHONE HYDROCHLORIDE 1 MG: 1 INJECTION, SOLUTION INTRAMUSCULAR; INTRAVENOUS; SUBCUTANEOUS at 19:49

## 2018-12-25 RX ADMIN — MONTELUKAST SODIUM 10 MG: 10 TABLET, COATED ORAL at 19:49

## 2018-12-25 RX ADMIN — DOLUTEGRAVIR SODIUM 50 MG: 50 TABLET, FILM COATED ORAL at 12:39

## 2018-12-25 RX ADMIN — PANTOPRAZOLE SODIUM 40 MG: 40 TABLET, DELAYED RELEASE ORAL at 06:06

## 2018-12-25 RX ADMIN — PIPERACILLIN SODIUM,TAZOBACTAM SODIUM 3.38 G: 3; .375 INJECTION, POWDER, FOR SOLUTION INTRAVENOUS at 05:56

## 2018-12-25 RX ADMIN — OXYCODONE AND ACETAMINOPHEN 1 TABLET: 5; 325 TABLET ORAL at 08:52

## 2018-12-25 RX ADMIN — INSULIN LISPRO 11 UNITS: 100 INJECTION, SOLUTION INTRAVENOUS; SUBCUTANEOUS at 08:46

## 2018-12-25 RX ADMIN — HYDROMORPHONE HYDROCHLORIDE 1 MG: 1 INJECTION, SOLUTION INTRAMUSCULAR; INTRAVENOUS; SUBCUTANEOUS at 00:54

## 2018-12-25 RX ADMIN — EMTRICITABINE AND TENOFOVIR DISOPROXIL FUMARATE 1 TABLET: 200; 300 TABLET, FILM COATED ORAL at 08:53

## 2018-12-25 RX ADMIN — INSULIN GLARGINE 15 UNITS: 100 INJECTION, SOLUTION SUBCUTANEOUS at 21:54

## 2018-12-25 RX ADMIN — VITAMIN D, TAB 1000IU (100/BT) 1000 UNITS: 25 TAB at 08:42

## 2018-12-25 RX ADMIN — HYDROMORPHONE HYDROCHLORIDE 1 MG: 1 INJECTION, SOLUTION INTRAMUSCULAR; INTRAVENOUS; SUBCUTANEOUS at 09:39

## 2018-12-25 RX ADMIN — APIXABAN 5 MG: 5 TABLET, FILM COATED ORAL at 08:42

## 2018-12-25 RX ADMIN — HYDROMORPHONE HYDROCHLORIDE 1 MG: 1 INJECTION, SOLUTION INTRAMUSCULAR; INTRAVENOUS; SUBCUTANEOUS at 06:06

## 2018-12-25 ASSESSMENT — PAIN DESCRIPTION - ORIENTATION
ORIENTATION: LEFT

## 2018-12-25 ASSESSMENT — PAIN DESCRIPTION - ONSET
ONSET: ON-GOING

## 2018-12-25 ASSESSMENT — PAIN DESCRIPTION - LOCATION
LOCATION: LEG;SCROTUM

## 2018-12-25 ASSESSMENT — PAIN DESCRIPTION - FREQUENCY
FREQUENCY: CONTINUOUS

## 2018-12-25 ASSESSMENT — PAIN DESCRIPTION - DESCRIPTORS
DESCRIPTORS: ACHING;DISCOMFORT

## 2018-12-25 ASSESSMENT — PAIN DESCRIPTION - PROGRESSION
CLINICAL_PROGRESSION: NOT CHANGED

## 2018-12-25 ASSESSMENT — PAIN SCALES - GENERAL
PAINLEVEL_OUTOF10: 9
PAINLEVEL_OUTOF10: 5
PAINLEVEL_OUTOF10: 9
PAINLEVEL_OUTOF10: 7
PAINLEVEL_OUTOF10: 9
PAINLEVEL_OUTOF10: 9
PAINLEVEL_OUTOF10: 7
PAINLEVEL_OUTOF10: 6
PAINLEVEL_OUTOF10: 7
PAINLEVEL_OUTOF10: 7
PAINLEVEL_OUTOF10: 6
PAINLEVEL_OUTOF10: 7
PAINLEVEL_OUTOF10: 9
PAINLEVEL_OUTOF10: 0
PAINLEVEL_OUTOF10: 9
PAINLEVEL_OUTOF10: 8

## 2018-12-25 ASSESSMENT — PAIN DESCRIPTION - PAIN TYPE
TYPE: ACUTE PAIN

## 2018-12-25 NOTE — PROGRESS NOTES
5 Units Subcutaneous TID WC    insulin lispro  0-18 Units Subcutaneous TID WC    insulin lispro  0-9 Units Subcutaneous Nightly    digoxin  125 mcg Oral Daily    calcium elemental  500 mg Oral BID    vitamin D  1,000 Units Oral Daily    atorvastatin  80 mg Oral Daily    montelukast  10 mg Oral Nightly    pantoprazole  40 mg Oral QAM AC      dextrose           Lab Data:  CBC:   Recent Labs      12/23/18   0610  12/24/18   0605   WBC  6.6  6.2   HGB  9.4*  9.1*   PLT  253  226     BMP:    Recent Labs      12/23/18   0610  12/24/18   0855   NA  133*  134*   K  5.3*  4.9   CO2  25  24   BUN  24*  27*   CREATININE  1.0  0.9     LIVR:   Recent Labs      12/24/18   0855   AST  18   ALT  14       Imaging:    Patient appears to be in atrial fibrillation.   Overall left ventricular systolic function appears severely reduced.   Ejection fraction is visually estimated to be 20% with diffuse hypokinesis   and minor regional variations. Normal left ventricular wall thickness and   cavity size. Spontaneous echo contrast noted in the left ventricle. Possible   small apical mural thrombus.   The right ventricle appears mildly dilated with severely reduced systolic   function.   The left atrium is severely dilated.   The right atrium is moderately dilated.   Trivial mitral regurgitation.   Mild tricuspid regurgitation.   Small pericardial effusion without tamponade physiology.   Possible ASD/PFO based on color Doppler.  IVC size is dilated (>2.1 cm) and collapses < 50% with respiration   consistent with elevated RA pressure (15 mmHg).  Estimated pulmonary artery systolic pressure is mildly elevated at 40 mmHg   assuming a right atrial pressure of 15 mmHg.       Assessment/Plan:      1. Atrial fibrillation/atrial flutter  Remains in controlled A. fib  On apixaban 5mg bid for anticoagulation    2.  Chronic Systolic CHF, class 2   ECHO shows severe LV dysfunction ( ischemic)   Toprol XL restarted    Aldactone and Entresto are

## 2018-12-25 NOTE — PROGRESS NOTES
Hospitalist Progress Note      PCP: Kaleb Saeed MD    Date of Admission: 12/9/2018  Hospital day - 15        Medications:  Reviewed    Infusion Medications    dextrose       Scheduled Medications    insulin glargine  15 Units Subcutaneous Nightly    apixaban  5 mg Oral BID    dolutegravir sodium  50 mg Oral Daily    emtricitabine-tenofovir  1 tablet Oral Nightly    piperacillin-tazobactam  3.375 g Intravenous Q8H    lidocaine 1 % injection  5 mL Intradermal Once    sodium chloride flush  10 mL Intravenous 2 times per day    metoprolol succinate  12.5 mg Oral Daily    insulin lispro  5 Units Subcutaneous TID WC    insulin lispro  0-18 Units Subcutaneous TID WC    insulin lispro  0-9 Units Subcutaneous Nightly    digoxin  125 mcg Oral Daily    calcium elemental  500 mg Oral BID    vitamin D  1,000 Units Oral Daily    atorvastatin  80 mg Oral Daily    montelukast  10 mg Oral Nightly    pantoprazole  40 mg Oral QAM AC     PRN Meds: oxyCODONE-acetaminophen **OR** oxyCODONE-acetaminophen, HYDROmorphone, sodium chloride flush, naloxone, bisacodyl, glucose, dextrose, glucagon (rDNA), dextrose, acetaminophen, diphenhydrAMINE      Intake/Output Summary (Last 24 hours) at 12/25/18 1715  Last data filed at 12/25/18 1614   Gross per 24 hour   Intake             1440 ml   Output             1545 ml   Net             -105 ml         Chief Complaint: mult leg wounds, ulcers         Subjective: 12.24- poor po intake, no fever, cfeel sbetter , vac in place    12.25- no new c/o, other than some itch in left thigh staples     ROS:  A 12 point review of symptoms is unremarkable with the exception of the chief complaint . Patient specifically denies cp  .     Exam performed by me:    /67   Pulse 73   Temp 98.2 °F (36.8 °C) (Oral)   Resp 18   Ht 6' 2.02\" (1.88 m)   Wt 209 lb 10.5 oz (95.1 kg)   SpO2 98%   BMI 26.91 kg/m²       General appearance: comfortable, distress- none   HEENT:

## 2018-12-26 LAB
EKG ATRIAL RATE: 300 BPM
EKG DIAGNOSIS: NORMAL
EKG P AXIS: -88 DEGREES
EKG Q-T INTERVAL: 364 MS
EKG QRS DURATION: 74 MS
EKG QTC CALCULATION (BAZETT): 393 MS
EKG R AXIS: 34 DEGREES
EKG T AXIS: 152 DEGREES
EKG VENTRICULAR RATE: 70 BPM
GLUCOSE BLD-MCNC: 142 MG/DL (ref 70–99)
GLUCOSE BLD-MCNC: 185 MG/DL (ref 70–99)
GLUCOSE BLD-MCNC: 73 MG/DL (ref 70–99)
GLUCOSE BLD-MCNC: 92 MG/DL (ref 70–99)
PERFORMED ON: ABNORMAL
PERFORMED ON: ABNORMAL
PERFORMED ON: NORMAL
PERFORMED ON: NORMAL

## 2018-12-26 PROCEDURE — 94760 N-INVAS EAR/PLS OXIMETRY 1: CPT

## 2018-12-26 PROCEDURE — 6360000002 HC RX W HCPCS: Performed by: INTERNAL MEDICINE

## 2018-12-26 PROCEDURE — 1200000000 HC SEMI PRIVATE

## 2018-12-26 PROCEDURE — 6370000000 HC RX 637 (ALT 250 FOR IP): Performed by: INTERNAL MEDICINE

## 2018-12-26 PROCEDURE — 6370000000 HC RX 637 (ALT 250 FOR IP): Performed by: HOSPITALIST

## 2018-12-26 PROCEDURE — 6360000002 HC RX W HCPCS: Performed by: HOSPITALIST

## 2018-12-26 PROCEDURE — 6360000002 HC RX W HCPCS: Performed by: NURSE PRACTITIONER

## 2018-12-26 PROCEDURE — 6370000000 HC RX 637 (ALT 250 FOR IP): Performed by: NURSE PRACTITIONER

## 2018-12-26 PROCEDURE — 99024 POSTOP FOLLOW-UP VISIT: CPT | Performed by: SURGERY

## 2018-12-26 PROCEDURE — 6370000000 HC RX 637 (ALT 250 FOR IP): Performed by: SURGERY

## 2018-12-26 PROCEDURE — 99232 SBSQ HOSP IP/OBS MODERATE 35: CPT | Performed by: INTERNAL MEDICINE

## 2018-12-26 PROCEDURE — 2500000003 HC RX 250 WO HCPCS: Performed by: INTERNAL MEDICINE

## 2018-12-26 PROCEDURE — 97530 THERAPEUTIC ACTIVITIES: CPT

## 2018-12-26 PROCEDURE — 93005 ELECTROCARDIOGRAM TRACING: CPT | Performed by: INTERNAL MEDICINE

## 2018-12-26 PROCEDURE — 2580000003 HC RX 258: Performed by: NURSE PRACTITIONER

## 2018-12-26 PROCEDURE — 2580000003 HC RX 258: Performed by: INTERNAL MEDICINE

## 2018-12-26 PROCEDURE — 93010 ELECTROCARDIOGRAM REPORT: CPT | Performed by: INTERNAL MEDICINE

## 2018-12-26 PROCEDURE — 2580000003 HC RX 258: Performed by: HOSPITALIST

## 2018-12-26 RX ORDER — METRONIDAZOLE 500 MG/1
500 TABLET ORAL EVERY 8 HOURS SCHEDULED
Status: DISCONTINUED | OUTPATIENT
Start: 2018-12-26 | End: 2018-12-27 | Stop reason: HOSPADM

## 2018-12-26 RX ORDER — FLUCONAZOLE 100 MG/1
100 TABLET ORAL NIGHTLY
Status: DISCONTINUED | OUTPATIENT
Start: 2018-12-26 | End: 2018-12-27 | Stop reason: HOSPADM

## 2018-12-26 RX ORDER — NALOXONE HYDROCHLORIDE 0.4 MG/ML
0.4 INJECTION, SOLUTION INTRAMUSCULAR; INTRAVENOUS; SUBCUTANEOUS PRN
Status: DISCONTINUED | OUTPATIENT
Start: 2018-12-26 | End: 2018-12-27 | Stop reason: HOSPADM

## 2018-12-26 RX ORDER — MORPHINE SULFATE 15 MG/1
15 TABLET, FILM COATED, EXTENDED RELEASE ORAL EVERY 12 HOURS SCHEDULED
Status: DISCONTINUED | OUTPATIENT
Start: 2018-12-26 | End: 2018-12-27 | Stop reason: HOSPADM

## 2018-12-26 RX ADMIN — MORPHINE SULFATE 15 MG: 15 TABLET, FILM COATED, EXTENDED RELEASE ORAL at 12:58

## 2018-12-26 RX ADMIN — METRONIDAZOLE 500 MG: 500 TABLET ORAL at 18:07

## 2018-12-26 RX ADMIN — INSULIN LISPRO 5 UNITS: 100 INJECTION, SOLUTION INTRAVENOUS; SUBCUTANEOUS at 09:21

## 2018-12-26 RX ADMIN — MICONAZOLE NITRATE: 20 POWDER TOPICAL at 18:01

## 2018-12-26 RX ADMIN — MORPHINE SULFATE 15 MG: 15 TABLET, FILM COATED, EXTENDED RELEASE ORAL at 21:02

## 2018-12-26 RX ADMIN — PIPERACILLIN SODIUM,TAZOBACTAM SODIUM 3.38 G: 3; .375 INJECTION, POWDER, FOR SOLUTION INTRAVENOUS at 12:59

## 2018-12-26 RX ADMIN — OXYCODONE AND ACETAMINOPHEN 2 TABLET: 5; 325 TABLET ORAL at 04:01

## 2018-12-26 RX ADMIN — PANTOPRAZOLE SODIUM 40 MG: 40 TABLET, DELAYED RELEASE ORAL at 05:06

## 2018-12-26 RX ADMIN — INSULIN LISPRO 2 UNITS: 100 INJECTION, SOLUTION INTRAVENOUS; SUBCUTANEOUS at 21:10

## 2018-12-26 RX ADMIN — CALCIUM 500 MG: 500 TABLET ORAL at 09:07

## 2018-12-26 RX ADMIN — HYDROMORPHONE HYDROCHLORIDE 1 MG: 1 INJECTION, SOLUTION INTRAMUSCULAR; INTRAVENOUS; SUBCUTANEOUS at 22:59

## 2018-12-26 RX ADMIN — PIPERACILLIN SODIUM,TAZOBACTAM SODIUM 3.38 G: 3; .375 INJECTION, POWDER, FOR SOLUTION INTRAVENOUS at 05:06

## 2018-12-26 RX ADMIN — CEFEPIME HYDROCHLORIDE 2 G: 2 INJECTION, POWDER, FOR SOLUTION INTRAVENOUS at 21:02

## 2018-12-26 RX ADMIN — DOLUTEGRAVIR SODIUM 50 MG: 50 TABLET, FILM COATED ORAL at 09:23

## 2018-12-26 RX ADMIN — DIGOXIN 125 MCG: 125 TABLET ORAL at 09:08

## 2018-12-26 RX ADMIN — Medication 10 ML: at 21:07

## 2018-12-26 RX ADMIN — INSULIN LISPRO 3 UNITS: 100 INJECTION, SOLUTION INTRAVENOUS; SUBCUTANEOUS at 09:18

## 2018-12-26 RX ADMIN — VITAMIN D, TAB 1000IU (100/BT) 1000 UNITS: 25 TAB at 09:07

## 2018-12-26 RX ADMIN — OXYCODONE AND ACETAMINOPHEN 2 TABLET: 5; 325 TABLET ORAL at 09:07

## 2018-12-26 RX ADMIN — METRONIDAZOLE 500 MG: 500 TABLET ORAL at 21:30

## 2018-12-26 RX ADMIN — HYDROMORPHONE HYDROCHLORIDE 1 MG: 1 INJECTION, SOLUTION INTRAMUSCULAR; INTRAVENOUS; SUBCUTANEOUS at 10:09

## 2018-12-26 RX ADMIN — Medication 10 ML: at 09:08

## 2018-12-26 RX ADMIN — HYDROMORPHONE HYDROCHLORIDE 1 MG: 1 INJECTION, SOLUTION INTRAMUSCULAR; INTRAVENOUS; SUBCUTANEOUS at 05:06

## 2018-12-26 RX ADMIN — APIXABAN 5 MG: 5 TABLET, FILM COATED ORAL at 21:02

## 2018-12-26 RX ADMIN — CALCIUM 500 MG: 500 TABLET ORAL at 21:02

## 2018-12-26 RX ADMIN — MONTELUKAST SODIUM 10 MG: 10 TABLET, COATED ORAL at 21:02

## 2018-12-26 RX ADMIN — EMTRICITABINE AND TENOFOVIR DISOPROXIL FUMARATE 1 TABLET: 200; 300 TABLET, FILM COATED ORAL at 21:09

## 2018-12-26 RX ADMIN — APIXABAN 5 MG: 5 TABLET, FILM COATED ORAL at 09:08

## 2018-12-26 RX ADMIN — OXYCODONE AND ACETAMINOPHEN 1 TABLET: 5; 325 TABLET ORAL at 18:08

## 2018-12-26 RX ADMIN — ATORVASTATIN CALCIUM 80 MG: 80 TABLET, FILM COATED ORAL at 09:08

## 2018-12-26 RX ADMIN — METOPROLOL SUCCINATE 12.5 MG: 25 TABLET, EXTENDED RELEASE ORAL at 09:07

## 2018-12-26 RX ADMIN — INSULIN GLARGINE 15 UNITS: 100 INJECTION, SOLUTION SUBCUTANEOUS at 21:10

## 2018-12-26 ASSESSMENT — PAIN SCALES - GENERAL
PAINLEVEL_OUTOF10: 9
PAINLEVEL_OUTOF10: 7
PAINLEVEL_OUTOF10: 8
PAINLEVEL_OUTOF10: 9
PAINLEVEL_OUTOF10: 0
PAINLEVEL_OUTOF10: 9
PAINLEVEL_OUTOF10: 8
PAINLEVEL_OUTOF10: 9
PAINLEVEL_OUTOF10: 8
PAINLEVEL_OUTOF10: 9
PAINLEVEL_OUTOF10: 8
PAINLEVEL_OUTOF10: 9
PAINLEVEL_OUTOF10: 8
PAINLEVEL_OUTOF10: 9

## 2018-12-26 ASSESSMENT — PAIN DESCRIPTION - FREQUENCY
FREQUENCY: CONTINUOUS
FREQUENCY: CONTINUOUS

## 2018-12-26 ASSESSMENT — PAIN DESCRIPTION - PROGRESSION
CLINICAL_PROGRESSION: NOT CHANGED
CLINICAL_PROGRESSION: NOT CHANGED

## 2018-12-26 ASSESSMENT — PAIN DESCRIPTION - PAIN TYPE
TYPE: ACUTE PAIN

## 2018-12-26 ASSESSMENT — PAIN DESCRIPTION - DESCRIPTORS
DESCRIPTORS: ACHING;DISCOMFORT
DESCRIPTORS: ACHING;DISCOMFORT

## 2018-12-26 ASSESSMENT — PAIN DESCRIPTION - LOCATION
LOCATION: LEG;SCROTUM
LOCATION: LEG;SCROTUM

## 2018-12-26 ASSESSMENT — PAIN DESCRIPTION - ORIENTATION
ORIENTATION: LEFT
ORIENTATION: LEFT

## 2018-12-26 ASSESSMENT — PAIN DESCRIPTION - ONSET
ONSET: ON-GOING
ONSET: ON-GOING

## 2018-12-26 NOTE — PROGRESS NOTES
(SINGULAIR) 10 MG tablet Take 10 mg by mouth nightly       nystatin (MYCOSTATIN) 783395 UNIT/GM cream APPLY TOPICALLY TWICE A DAY      pantoprazole (PROTONIX) 40 MG tablet Take 40 mg by mouth daily       promethazine (PHENERGAN) 25 MG tablet TAKE 1 TABLET BY MOUTH TWICE A DAY AS NEEDED FOR NAUSEA.  Simethicone 125 MG CAPS Take 1 tablet by mouth      traMADol (ULTRAM) 50 MG tablet Take 50 mg by mouth every 6 hours as needed. Adelaida Southern zolpidem (AMBIEN) 10 MG tablet Take 10 mg by mouth nightly as needed. .         Allergies:  Patient has no known allergies. Immunizations :   Immunization History   Administered Date(s) Administered    Influenza, Quadv, 6 mo and older, IM, PF (Flulaval, Fluarix) 12/11/2018       Social History:    Social History   Substance Use Topics    Smoking status: Never Smoker    Smokeless tobacco: Never Used    Alcohol use No     History   Smoking Status    Never Smoker   Smokeless Tobacco    Never Used      Family History : no DVT no COPD       REVIEW OF SYSTEMS:    No fever / chills / sweats. No weight loss. No visual change, eye pain, eye discharge. No oral lesion, sore throat, dysphagia. Denies cough / sputum/Sob   Denies chest pain, palpitations/ dizziness  Denies nausea/ vomiting/abdominal pain/diarrhea. Denies dysuria or change in urinary function. Denies joint swelling or pain. No myalgia, arthralgia. No rashes, skin lesions   Denies focal weakness, sensory change or other neurologic symptoms  No lymph node swelling or tenderness.     Left leg pain and swelling +  And Left groin pain+ scrotal edema +     PHYSICAL EXAM:      Vitals:    /72   Pulse 83   Temp 99 °F (37.2 °C) (Oral)   Resp 16   Ht 6' 2.02\" (1.88 m)   Wt 211 lb 13.8 oz (96.1 kg)   SpO2 97%   BMI 27.19 kg/m²   General Appearance: alert,in no acute distress, +  pallor, no icterus chronically ill appearing man + poor dentition on nasal cannula    Skin: warm and dry, no rash or erythema  Head: 1.018 12/23/2018    BLOODU MODERATE 12/23/2018    PHUR 6.0 12/23/2018    PROTEINU 30 12/23/2018    UROBILINOGEN 0.2 12/23/2018    NITRU Negative 12/23/2018    LEUKOCYTESUR Negative 12/23/2018    LABMICR YES 12/23/2018    URINETYPE Not Specified 12/23/2018      Urine Microscopic:   Lab Results   Component Value Date    COMU see below 12/10/2018    HYALCAST 0 12/23/2018    WBCUA 1 12/23/2018    RBCUA 36 12/23/2018    EPIU 0 12/23/2018     BNP  : [id number]     MICRO: cultures reviewed and updated by me   Procedure Component Value Units Date/Time   Tissue Culture [[id number]] (Abnormal) Collected: 12/10/18 1556   Order Status: Completed Specimen: Tissue from Tissue Updated: 12/11/18 1430    Gram Stain Result 3+ Gram negative rods   4+ Gram positive cocci   No WBC's seen    (A)    Organism Gram negative neil (A)    WOUND/ABSCESS --    Moderate growth   ID and sensitivity to follow     Organism Gram negative neil (A)    WOUND/ABSCESS --    Moderate growth   ID and sensitivity to follow   Second colony type    Narrative:     ORDER#: [id number]                          ORDERED BY: Win Aguilar  SOURCE: Tissue                             COLLECTED:  12/10/18 15:56  ANTIBIOTICS AT SERGEI. :                      RECEIVED :  12/10/18 16:50  Performed at:  [facility]  [address] [facility] [place] [facility] [address]   Phone [phone]   Wound Culture [[id number]] (Abnormal) Collected: 12/10/18 0900   Order Status: Completed Specimen: Leg from Leg Updated: 12/11/18 1253    WOUND/ABSCESS --    Gram Stain Result No WBCs or organisms seen    Organism Gram negative neil (A)    WOUND/ABSCESS --    Light growth   ID and sensitivity to follow     Organism Gram negative neil (A)    WOUND/ABSCESS --    Light growth   ID and sensitivity to follow   Second colony type    Narrative:     ORDER#: [id number]                          ORDERED BY: Seda Bolden  SOURCE: Leg Tendon Left                    COLLECTED:  12/10/18 [address] [place], [place] [address]   Phone [phone]   CULTURE BLOOD #1 [[id number]] Collected: 12/10/18 0000   Order Status: Canceled Specimen: Blood from Blood    CULTURE BLOOD #2 [[id number]] Collected: 12/10/18 0000   Order Status: Canceled Specimen: Blood        ENTEROBACTER AEROGENES     Antibiotic Interpretation KALLIE Unit   amoxicillin-clavulanate Resistant >16/8 mcg/mL   ampicillin Resistant >16 mcg/mL   ceFAZolin Resistant >16 mcg/mL   cefTRIAXone Sensitive <=1 mcg/mL   cefepime Sensitive <=2 mcg/mL   cefotaxime Sensitive <=2 mcg/mL   cefuroxime Resistant <=4 mcg/mL   ciprofloxacin Sensitive <=1 mcg/mL   gentamicin Sensitive <=4 mcg/mL   meropenem Sensitive <=1 mcg/mL   piperacillin-tazobactam Sensitive <=16 mcg/mL   trimethoprim-sulfamethoxazole Resistant >2/38 mcg/mL        FINAL DIAGNOSIS:    Left leg wound tissue, debridement:  -Soft tissue with acute inflammation and granulation tissue.  - Negative for malignancy.  JIAJA/JIAANDRAE    IMAGING:    XR CHEST PORTABLE   Final Result   Appropriate positioning of right PICC line. VL PRE OP VEIN MAPPING   Final Result      XR TIBIA FIBULA LEFT (2 VIEWS)   Final Result   No definite cortical destruction to suggest osteomyelitis. If clinical   suspicion persists, MRI would be of further value.                All the pertinent images and reports for the current Hospitalization were reviewed by me     Scheduled Meds:   morphine  15 mg Oral 2 times per day    insulin glargine  15 Units Subcutaneous Nightly    apixaban  5 mg Oral BID    dolutegravir sodium  50 mg Oral Daily    emtricitabine-tenofovir  1 tablet Oral Nightly    piperacillin-tazobactam  3.375 g Intravenous Q8H    lidocaine 1 % injection  5 mL Intradermal Once    sodium chloride flush  10 mL Intravenous 2 times per day    metoprolol succinate  12.5 mg Oral Daily    insulin lispro  5 Units Subcutaneous TID WC    insulin lispro  0-18 Units Subcutaneous TID WC    insulin lispro 0-9 Units Subcutaneous Nightly    digoxin  125 mcg Oral Daily    calcium elemental  500 mg Oral BID    vitamin D  1,000 Units Oral Daily    atorvastatin  80 mg Oral Daily    montelukast  10 mg Oral Nightly    pantoprazole  40 mg Oral QAM AC       Continuous Infusions:   dextrose         PRN Meds:  HYDROmorphone, naloxone, oxyCODONE-acetaminophen **OR** [DISCONTINUED] oxyCODONE-acetaminophen, sodium chloride flush, naloxone, bisacodyl, glucose, dextrose, glucagon (rDNA), dextrose, acetaminophen, diphenhydrAMINE      Assessment:   Left leg necrotic wound infection   Odor and drainage and mixed infectious process on admit   Left leg some concern for Vascular process  CAD with h/o MI  Medical non compliance per [facility] records   Suspect mixed infectious process  Concern for osteomyelitis  HIV on therapy   Diabetic leg infection    Per  records CD4 check last > 700 and he says he is complaint with HIV therapy    GALI      Given the depth of the infection and duration concern for extension down to the bone and s/p OR for debridement and Cx GNR mixed process -Polymicrobial infection    Risk for limb loss high and he understand  willing to comply with recommendations and medical care      S/p Vascular intervention and revascularization femoropopliteal bypass grafting on Left leg  on 12/17      He has some worsening of creat and he does have cardiomyopathy and has [place] BP for a while and has on going trop leak is concerning    Creat now better and will change his HAART therapy and discussed about the regimen    Has wound vac in place and anticipate x long course of IV abx and needs to treat like osteomyelitis given the bone at the base of the wound    Fungal dermatitis in the groin will use topical therapy first -   EKG check as QT interval is prolonged will check before considering Fluconazole therapy     Labs, Microbiology, Radiology and all the pertinent results from current hospitalization and  care every

## 2018-12-26 NOTE — PROGRESS NOTES
HLD, HTN, and DM who presents to the emergency department today complaining of pain, redness, and warmth to a chronic ulcer on his left leg. Patient states that he stopped following up with wound care. He denies fevers at home. On arrival his heart rate is 157. Patient states he did not take his metoprolol today and his partner who is at bedside states that patient is using crystal meth.   Response to previous treatment: Patient reporting fatigue but able to participate  Family / Caregiver Present: No  Referring Practitioner: Minh Granda  Diagnosis: Multiple open wounds on Left LE, s/p L  fem pop 12/17/18  Subjective: Patient met b/s, lying supine, getting ready to eat, agreeable to get to chair to eat, yet after increased time to sit half way to EOB, staff arrives to complete EKG and reports needing patient to return to supine to complete, so did not get OOB this date   Patient Currently in Pain: Yes (asking for pain meds by hitting pain light, PCA notified who reports she will notify RN, doesn't rate yet reports scrotal and left leg pain )     Orientation  Overall Orientation Status: Within Functional Limits    Objective    ADL  Grooming: Setup (handwashing with wipes )  LE Bathing:  (patient washes fazal-area with bath wipes while lying supine per patient request to perform at this time with SBA)    Balance  Sitting Balance:  (patient did not make it completely to EOB, yet was SBA for partial upright sitting )    Bed mobility  Supine to Sit:  (patient with right leg off bed, left foot and lower calf off bed and then staff arrives for EKG reporting patient needed to lie supine, patient completed this much with SBA)  Sit to Supine:  (patient able to get B LE back onto bed completely and lie down at the trunk with SBA )  Scooting:  (with bed flat and use of B HR, patient was able to scoot self towards [facility] )  Comments: patient required extended time to initiate and carry out any movement due to scrotal and left leg

## 2018-12-27 VITALS
SYSTOLIC BLOOD PRESSURE: 120 MMHG | TEMPERATURE: 98.4 F | RESPIRATION RATE: 18 BRPM | BODY MASS INDEX: 25.69 KG/M2 | DIASTOLIC BLOOD PRESSURE: 76 MMHG | HEIGHT: 74 IN | HEART RATE: 77 BPM | WEIGHT: 200.18 LBS | OXYGEN SATURATION: 98 %

## 2018-12-27 LAB
ALBUMIN SERPL-MCNC: 2.3 G/DL (ref 3.4–5)
ALP BLD-CCNC: 82 U/L (ref 40–129)
ALT SERPL-CCNC: 14 U/L (ref 10–40)
ANION GAP SERPL CALCULATED.3IONS-SCNC: 12 MMOL/L (ref 3–16)
AST SERPL-CCNC: 25 U/L (ref 15–37)
BASOPHILS ABSOLUTE: 0.1 K/UL (ref 0–0.2)
BASOPHILS RELATIVE PERCENT: 1.3 %
BILIRUB SERPL-MCNC: 0.5 MG/DL (ref 0–1)
BILIRUBIN DIRECT: <0.2 MG/DL (ref 0–0.3)
BILIRUBIN, INDIRECT: ABNORMAL MG/DL (ref 0–1)
BUN BLDV-MCNC: 26 MG/DL (ref 7–20)
CALCIUM SERPL-MCNC: 8.4 MG/DL (ref 8.3–10.6)
CHLORIDE BLD-SCNC: 100 MMOL/L (ref 99–110)
CO2: 25 MMOL/L (ref 21–32)
CREAT SERPL-MCNC: 1 MG/DL (ref 0.9–1.3)
EOSINOPHILS ABSOLUTE: 0.1 K/UL (ref 0–0.6)
EOSINOPHILS RELATIVE PERCENT: 1.2 %
GFR AFRICAN AMERICAN: >60
GFR NON-AFRICAN AMERICAN: >60
GLUCOSE BLD-MCNC: 111 MG/DL (ref 70–99)
GLUCOSE BLD-MCNC: 137 MG/DL (ref 70–99)
GLUCOSE BLD-MCNC: 149 MG/DL (ref 70–99)
GLUCOSE BLD-MCNC: 156 MG/DL (ref 70–99)
GLUCOSE BLD-MCNC: 82 MG/DL (ref 70–99)
HCT VFR BLD CALC: 27.8 % (ref 40.5–52.5)
HEMOGLOBIN: 9.1 G/DL (ref 13.5–17.5)
LYMPHOCYTES ABSOLUTE: 1 K/UL (ref 1–5.1)
LYMPHOCYTES RELATIVE PERCENT: 12.3 %
MAGNESIUM: 1.7 MG/DL (ref 1.8–2.4)
MCH RBC QN AUTO: 27.5 PG (ref 26–34)
MCHC RBC AUTO-ENTMCNC: 32.5 G/DL (ref 31–36)
MCV RBC AUTO: 84.5 FL (ref 80–100)
MONOCYTES ABSOLUTE: 0.8 K/UL (ref 0–1.3)
MONOCYTES RELATIVE PERCENT: 9.8 %
NEUTROPHILS ABSOLUTE: 5.9 K/UL (ref 1.7–7.7)
NEUTROPHILS RELATIVE PERCENT: 75.4 %
PDW BLD-RTO: 22.7 % (ref 12.4–15.4)
PERFORMED ON: ABNORMAL
PERFORMED ON: NORMAL
PHOSPHORUS: 2.3 MG/DL (ref 2.5–4.9)
PLATELET # BLD: 223 K/UL (ref 135–450)
PMV BLD AUTO: 7 FL (ref 5–10.5)
POTASSIUM REFLEX MAGNESIUM: 5.1 MMOL/L (ref 3.5–5.1)
RBC # BLD: 3.29 M/UL (ref 4.2–5.9)
SODIUM BLD-SCNC: 137 MMOL/L (ref 136–145)
TOTAL PROTEIN: 6.4 G/DL (ref 6.4–8.2)
WBC # BLD: 7.8 K/UL (ref 4–11)

## 2018-12-27 PROCEDURE — 80076 HEPATIC FUNCTION PANEL: CPT

## 2018-12-27 PROCEDURE — 83735 ASSAY OF MAGNESIUM: CPT

## 2018-12-27 PROCEDURE — 6370000000 HC RX 637 (ALT 250 FOR IP): Performed by: INTERNAL MEDICINE

## 2018-12-27 PROCEDURE — 6370000000 HC RX 637 (ALT 250 FOR IP): Performed by: HOSPITALIST

## 2018-12-27 PROCEDURE — 85025 COMPLETE CBC W/AUTO DIFF WBC: CPT

## 2018-12-27 PROCEDURE — 2580000003 HC RX 258: Performed by: INTERNAL MEDICINE

## 2018-12-27 PROCEDURE — 6360000002 HC RX W HCPCS: Performed by: INTERNAL MEDICINE

## 2018-12-27 PROCEDURE — 80048 BASIC METABOLIC PNL TOTAL CA: CPT

## 2018-12-27 PROCEDURE — 6370000000 HC RX 637 (ALT 250 FOR IP): Performed by: NURSE PRACTITIONER

## 2018-12-27 PROCEDURE — 6370000000 HC RX 637 (ALT 250 FOR IP): Performed by: SURGERY

## 2018-12-27 PROCEDURE — 36592 COLLECT BLOOD FROM PICC: CPT

## 2018-12-27 PROCEDURE — 2580000003 HC RX 258: Performed by: NURSE PRACTITIONER

## 2018-12-27 PROCEDURE — 97530 THERAPEUTIC ACTIVITIES: CPT

## 2018-12-27 PROCEDURE — 99232 SBSQ HOSP IP/OBS MODERATE 35: CPT | Performed by: INTERNAL MEDICINE

## 2018-12-27 PROCEDURE — 84100 ASSAY OF PHOSPHORUS: CPT

## 2018-12-27 PROCEDURE — 99024 POSTOP FOLLOW-UP VISIT: CPT | Performed by: SURGERY

## 2018-12-27 RX ORDER — METRONIDAZOLE 500 MG/1
500 TABLET ORAL EVERY 8 HOURS SCHEDULED
Qty: 90 TABLET | Refills: 0 | Status: SHIPPED | OUTPATIENT
Start: 2018-12-27 | End: 2018-12-27

## 2018-12-27 RX ORDER — FLUCONAZOLE 100 MG/1
100 TABLET ORAL NIGHTLY
Qty: 7 TABLET | Refills: 0 | Status: CANCELLED | OUTPATIENT
Start: 2018-12-27 | End: 2019-01-03

## 2018-12-27 RX ORDER — MORPHINE SULFATE 15 MG/1
15 TABLET, FILM COATED, EXTENDED RELEASE ORAL EVERY 12 HOURS SCHEDULED
Qty: 30 TABLET | Refills: 0 | Status: SHIPPED | OUTPATIENT
Start: 2018-12-27 | End: 2019-01-11

## 2018-12-27 RX ORDER — METOPROLOL SUCCINATE 25 MG/1
12.5 TABLET, EXTENDED RELEASE ORAL DAILY
Qty: 30 TABLET | Refills: 3 | Status: ON HOLD | OUTPATIENT
Start: 2018-12-28 | End: 2019-02-14

## 2018-12-27 RX ORDER — METRONIDAZOLE 500 MG/1
500 TABLET ORAL EVERY 8 HOURS SCHEDULED
Qty: 90 TABLET | Refills: 0 | Status: SHIPPED | OUTPATIENT
Start: 2018-12-27 | End: 2019-01-24 | Stop reason: SDUPTHER

## 2018-12-27 RX ORDER — FUROSEMIDE 40 MG/1
40 TABLET ORAL DAILY
Qty: 60 TABLET | Refills: 3 | Status: SHIPPED | OUTPATIENT
Start: 2018-12-27 | End: 2018-12-27

## 2018-12-27 RX ORDER — METOPROLOL SUCCINATE 25 MG/1
12.5 TABLET, EXTENDED RELEASE ORAL DAILY
Qty: 30 TABLET | Refills: 3 | Status: SHIPPED | OUTPATIENT
Start: 2018-12-28 | End: 2018-12-27

## 2018-12-27 RX ORDER — EMTRICITABINE AND TENOFOVIR DISOPROXIL FUMARATE 200; 300 MG/1; MG/1
1 TABLET, FILM COATED ORAL NIGHTLY
Qty: 30 TABLET | Refills: 3 | Status: ON HOLD | OUTPATIENT
Start: 2018-12-27 | End: 2019-02-14

## 2018-12-27 RX ORDER — DIGOXIN 125 MCG
125 TABLET ORAL DAILY
Qty: 30 TABLET | Refills: 3 | Status: SHIPPED | OUTPATIENT
Start: 2018-12-28 | End: 2018-12-27

## 2018-12-27 RX ORDER — DIGOXIN 125 MCG
125 TABLET ORAL DAILY
Qty: 30 TABLET | Refills: 3 | Status: ON HOLD | OUTPATIENT
Start: 2018-12-28 | End: 2019-02-14

## 2018-12-27 RX ORDER — FUROSEMIDE 40 MG/1
40 TABLET ORAL DAILY
Qty: 60 TABLET | Refills: 3 | Status: ON HOLD | OUTPATIENT
Start: 2018-12-27 | End: 2019-02-14

## 2018-12-27 RX ORDER — EMTRICITABINE AND TENOFOVIR DISOPROXIL FUMARATE 200; 300 MG/1; MG/1
1 TABLET, FILM COATED ORAL NIGHTLY
Qty: 30 TABLET | Refills: 3 | Status: SHIPPED | OUTPATIENT
Start: 2018-12-27 | End: 2018-12-27

## 2018-12-27 RX ORDER — OXYCODONE HYDROCHLORIDE AND ACETAMINOPHEN 5; 325 MG/1; MG/1
1 TABLET ORAL EVERY 8 HOURS PRN
Qty: 42 TABLET | Refills: 0 | Status: SHIPPED | OUTPATIENT
Start: 2018-12-27 | End: 2019-01-10

## 2018-12-27 RX ADMIN — METRONIDAZOLE 500 MG: 500 TABLET ORAL at 13:11

## 2018-12-27 RX ADMIN — MORPHINE SULFATE 15 MG: 15 TABLET, FILM COATED, EXTENDED RELEASE ORAL at 08:09

## 2018-12-27 RX ADMIN — INSULIN GLARGINE 15 UNITS: 100 INJECTION, SOLUTION SUBCUTANEOUS at 20:41

## 2018-12-27 RX ADMIN — APIXABAN 5 MG: 5 TABLET, FILM COATED ORAL at 20:34

## 2018-12-27 RX ADMIN — INSULIN LISPRO 3 UNITS: 100 INJECTION, SOLUTION INTRAVENOUS; SUBCUTANEOUS at 08:10

## 2018-12-27 RX ADMIN — FLUCONAZOLE 100 MG: 100 TABLET ORAL at 00:06

## 2018-12-27 RX ADMIN — APIXABAN 5 MG: 5 TABLET, FILM COATED ORAL at 08:09

## 2018-12-27 RX ADMIN — HYDROMORPHONE HYDROCHLORIDE 1 MG: 1 INJECTION, SOLUTION INTRAMUSCULAR; INTRAVENOUS; SUBCUTANEOUS at 08:10

## 2018-12-27 RX ADMIN — CEFEPIME HYDROCHLORIDE 2 G: 2 INJECTION, POWDER, FOR SOLUTION INTRAVENOUS at 19:05

## 2018-12-27 RX ADMIN — METRONIDAZOLE 500 MG: 500 TABLET ORAL at 06:22

## 2018-12-27 RX ADMIN — METOPROLOL SUCCINATE 12.5 MG: 25 TABLET, EXTENDED RELEASE ORAL at 08:09

## 2018-12-27 RX ADMIN — DIGOXIN 125 MCG: 125 TABLET ORAL at 08:09

## 2018-12-27 RX ADMIN — Medication 10 ML: at 08:10

## 2018-12-27 RX ADMIN — EMTRICITABINE AND TENOFOVIR DISOPROXIL FUMARATE 1 TABLET: 200; 300 TABLET, FILM COATED ORAL at 20:47

## 2018-12-27 RX ADMIN — Medication 10 ML: at 20:34

## 2018-12-27 RX ADMIN — VITAMIN D, TAB 1000IU (100/BT) 1000 UNITS: 25 TAB at 08:09

## 2018-12-27 RX ADMIN — DOLUTEGRAVIR SODIUM 50 MG: 50 TABLET, FILM COATED ORAL at 13:11

## 2018-12-27 RX ADMIN — HYDROMORPHONE HYDROCHLORIDE 1 MG: 1 INJECTION, SOLUTION INTRAMUSCULAR; INTRAVENOUS; SUBCUTANEOUS at 03:44

## 2018-12-27 RX ADMIN — CALCIUM 500 MG: 500 TABLET ORAL at 20:33

## 2018-12-27 RX ADMIN — FLUCONAZOLE 100 MG: 100 TABLET ORAL at 20:34

## 2018-12-27 RX ADMIN — MONTELUKAST SODIUM 10 MG: 10 TABLET, COATED ORAL at 20:34

## 2018-12-27 RX ADMIN — MORPHINE SULFATE 15 MG: 15 TABLET, FILM COATED, EXTENDED RELEASE ORAL at 20:34

## 2018-12-27 RX ADMIN — CALCIUM 500 MG: 500 TABLET ORAL at 08:09

## 2018-12-27 RX ADMIN — ATORVASTATIN CALCIUM 80 MG: 80 TABLET, FILM COATED ORAL at 08:09

## 2018-12-27 RX ADMIN — INSULIN LISPRO 2 UNITS: 100 INJECTION, SOLUTION INTRAVENOUS; SUBCUTANEOUS at 20:41

## 2018-12-27 RX ADMIN — CEFEPIME HYDROCHLORIDE 2 G: 2 INJECTION, POWDER, FOR SOLUTION INTRAVENOUS at 08:09

## 2018-12-27 RX ADMIN — PANTOPRAZOLE SODIUM 40 MG: 40 TABLET, DELAYED RELEASE ORAL at 06:22

## 2018-12-27 RX ADMIN — INSULIN LISPRO 5 UNITS: 100 INJECTION, SOLUTION INTRAVENOUS; SUBCUTANEOUS at 08:15

## 2018-12-27 RX ADMIN — OXYCODONE AND ACETAMINOPHEN 1 TABLET: 5; 325 TABLET ORAL at 18:10

## 2018-12-27 ASSESSMENT — PAIN DESCRIPTION - ORIENTATION: ORIENTATION: LEFT

## 2018-12-27 ASSESSMENT — PAIN SCALES - GENERAL
PAINLEVEL_OUTOF10: 8
PAINLEVEL_OUTOF10: 9
PAINLEVEL_OUTOF10: 5

## 2018-12-27 ASSESSMENT — PAIN DESCRIPTION - PAIN TYPE: TYPE: ACUTE PAIN

## 2018-12-27 ASSESSMENT — PAIN DESCRIPTION - ONSET: ONSET: ON-GOING

## 2018-12-27 ASSESSMENT — PAIN DESCRIPTION - FREQUENCY: FREQUENCY: CONTINUOUS

## 2018-12-27 ASSESSMENT — PAIN DESCRIPTION - LOCATION: LOCATION: LEG;SCROTUM

## 2018-12-27 ASSESSMENT — PAIN DESCRIPTION - PROGRESSION: CLINICAL_PROGRESSION: NOT CHANGED

## 2018-12-27 ASSESSMENT — PAIN DESCRIPTION - DESCRIPTORS: DESCRIPTORS: ACHING;DISCOMFORT

## 2018-12-27 NOTE — PROGRESS NOTES
BILIDIR  <0.2     CBC with Differential:    Lab Results   Component Value Date    WBC 7.8 12/27/2018    RBC 3.29 12/27/2018    HGB 9.1 12/27/2018    HCT 27.8 12/27/2018     12/27/2018    MCV 84.5 12/27/2018    MCH 27.5 12/27/2018    MCHC 32.5 12/27/2018    RDW 22.7 12/27/2018    LYMPHOPCT 12.3 12/27/2018    MONOPCT 9.8 12/27/2018    BASOPCT 1.3 12/27/2018    MONOSABS 0.8 12/27/2018    LYMPHSABS 1.0 12/27/2018    EOSABS 0.1 12/27/2018    BASOSABS 0.1 12/27/2018     BMP:    Lab Results   Component Value Date     12/27/2018    K 5.1 12/27/2018     12/27/2018    CO2 25 12/27/2018    BUN 26 12/27/2018    LABALBU 2.3 12/27/2018    CREATININE 1.0 12/27/2018    CALCIUM 8.4 12/27/2018    GFRAA >60 12/27/2018    LABGLOM >60 12/27/2018    GLUCOSE 137 12/27/2018         Keara Armijo  Electronically signed 12/27/2018 at 12:11 PM

## 2018-12-27 NOTE — PROGRESS NOTES
Physical Therapy  Brief Note    Patient Name: Joshua Shelley   Patient : 1960  MRN: [id number]   Room Number: R1Z-6093/5426-33      I went to patient's bedside to attempt treatment today; pt declined mobility citing leg discomfort and scrotal pain from swelling. I educated patient on elevation of swollen body regions above heart level. Pt agreeable to placing bed in Trendelenburg position for 10 minutes. Pt laid in this position with head flat for 10 minutes; patient assisted with placing a rolled up pillow case under his scrotum with use of pillow case sling by pt to elevate scrotum. I strongly encouraged mobility with patient as well as participation with home PT; pt verbalized understanding but by history this stay has been bed seeking. Pt really appears to be more appropriate for stay at a facility but per report of nursing is declining this. Recommend that patient have level 3 home PT and home health services as he appears high risk for further functional decline due to self limiting of mobility.      HOME HEALTH CARE: LEVEL 3 SAFETY   -Initial home health evaluation to occur within 24-48 hours, in patient home    -Home health agency to establish plan of care for patient over 60 day period    -Medication Reconciliation    -PT/OT/Speech evaluations in home within 24-48 hours of discharge; including DME and home safety    -Frontload therapy 5 days, then 3x a week    -OT to evaluate if patient has [address] needs for personal care    - evaluation within 24-48 hours, includes evaluation of resources and insurance to determine [place], [place], LTC, and Medicaid options    -PCP Visit scheduled within three to seven days of discharge       Time In: 1429  Time Out: 1450  Treatment Time: 11 minutes      Lázaro Hodge, PT

## 2018-12-27 NOTE — PROGRESS NOTES
Medication Sig Dispense Refill    morphine (MS CONTIN) 15 MG extended release tablet Take 1 tablet by mouth every 12 hours for 30 doses. Lana Fries Date: 12/27/18 30 tablet 0    oxyCODONE-acetaminophen (PERCOCET) 5-325 MG per tablet Take 1 tablet by mouth every 8 hours as needed for Pain for up to 14 days. Lana Fries Date: 12/27/18 42 tablet 0    apixaban (ELIQUIS) 5 MG TABS tablet Take 1 tablet by mouth 2 times daily 60 tablet 1    dolutegravir sodium (TIVICAY) 50 MG tablet Take 1 tablet by mouth daily 30 tablet 1    emtricitabine-tenofovir (TRUVADA) 200-300 MG per tablet Take 1 tablet by mouth nightly 30 tablet 3    [START ON 12/28/2018] metoprolol succinate (TOPROL XL) 25 MG extended release tablet Take 0.5 tablets by mouth daily 30 tablet 3    [START ON 12/28/2018] digoxin (LANOXIN) 125 MCG tablet Take 1 tablet by mouth daily 30 tablet 3    metroNIDAZOLE (FLAGYL) 500 MG tablet Take 1 tablet by mouth every 8 hours 90 tablet 0    furosemide (LASIX) 40 MG tablet Take 1 tablet by mouth daily 60 tablet 3    potassium chloride (KLOR-CON M) 20 MEQ extended release tablet Take 20 mEq by mouth 2 times daily      calcium carbonate (OYSTER SHELL CALCIUM 500 MG) 1250 (500 Ca) MG tablet Take 1 tablet by mouth 2 times daily      vitamin D (CHOLECALCIFEROL) 1000 units TABS tablet Take 1,000 Units by mouth daily      alendronate (FOSAMAX) 70 MG tablet TAKE 1 TABLET BY MOUTH ONCE WEEKLY BEFORE BREAKFAST, ON AN EMPTY STOMACH: REMAIN UPRIGHT FOR 30 MINUTES      ARIPiprazole (ABILIFY) 2 MG tablet Take 2 mg by mouth nightly       aspirin 81 MG chewable tablet Take 81 mg by mouth daily       atorvastatin (LIPITOR) 80 MG tablet TAKE 1 TABLET BY MOUTH DAILY      clonazePAM (KLONOPIN) 1 MG tablet Take 1 mg by mouth 3 times daily as needed.  .      fluticasone (FLONASE) 50 MCG/ACT nasal spray PLACE ONE SPRAY IN EACH NOSTRIL ONCE DAILY      insulin aspart (NOVOLOG) 100 UNIT/ML injection pen inject up 4 units before meals with a 1 unit per 50 mg/dL >150 mg/dL. Max daily dose 50 units      insulin glargine (LANTUS) 100 UNIT/ML injection vial Inject 12 Units into the skin daily       lidocaine (XYLOCAINE) 5 % ointment Apply topically      loperamide (IMODIUM) 2 MG capsule TAKE 1 CAPSULE BY MOUTH 4 TIMES A DAY AS NEEDED FOR DIARRHEA      metFORMIN (GLUCOPHAGE) 500 MG tablet Take 500 mg by mouth 2 times daily (with meals)       mirtazapine (REMERON) 45 MG tablet Take 45 mg by mouth nightly       mometasone (ASMANEX) 220 MCG/INH inhaler Inhale 2 puffs into the lungs daily       montelukast (SINGULAIR) 10 MG tablet Take 10 mg by mouth nightly       nystatin (MYCOSTATIN) 402445 UNIT/GM cream APPLY TOPICALLY TWICE A DAY      pantoprazole (PROTONIX) 40 MG tablet Take 40 mg by mouth daily       promethazine (PHENERGAN) 25 MG tablet TAKE 1 TABLET BY MOUTH TWICE A DAY AS NEEDED FOR NAUSEA.  Simethicone 125 MG CAPS Take 1 tablet by mouth      zolpidem (AMBIEN) 10 MG tablet Take 10 mg by mouth nightly as needed. .         Allergies:  Patient has no known allergies. Immunizations :   Immunization History   Administered Date(s) Administered    Influenza, Quadv, 6 mo and older, IM, PF (Flulaval, Fluarix) 12/11/2018       Social History:    Social History   Substance Use Topics    Smoking status: Never Smoker    Smokeless tobacco: Never Used    Alcohol use No     History   Smoking Status    Never Smoker   Smokeless Tobacco    Never Used      Family History : no DVT no COPD       REVIEW OF SYSTEMS:    No fever / chills / sweats. No weight loss. No visual change, eye pain, eye discharge. No oral lesion, sore throat, dysphagia. Denies cough / sputum/Sob   Denies chest pain, palpitations/ dizziness  Denies nausea/ vomiting/abdominal pain/diarrhea. Denies dysuria or change in urinary function. Denies joint swelling or pain. No myalgia, arthralgia.   No rashes, skin lesions   Denies focal weakness, sensory worsening of creat and he does have cardiomyopathy and has Reuel Overlie BP for a while and has on going trop leak is concerning    Creat now better and will change his HAART therapy and discussed about the regimen    Has wound vac in place and anticipate x long course of IV abx and needs to treat like osteomyelitis given the bone at the base of the wound      Fungal dermatitis in the groin will use topical therapy first -   EKG check as QT interval  Normal and Fluconazole added    He declines placement      Labs, Microbiology, Radiology and all the pertinent results from current hospitalization and  care every where were reviewed  by me as a part of the evaluation   Plan: 1. Cont New HAART as adjusted  2. Cont local care  - wound vac placed  Pictures reviewed   3. Cont   IV Cefepime and Oral Flagyl coverage for x 4 weeks at d/c   4. Topical antifungal therapy  5. Cont HAART -  Dolutegravir + Truvada for now   6. S/p Vascular intervention  Completed   7. Will need repeat CD4 counts and HIV viral load in x 4 weeks   8. Follow up x in 4 weeks with me  9. ELIAZAR done ok for d/c   10. He will call me with any new concerns about HIV meds or IV abx      Discussed with patient/Family  D/w RN d/w      Thanks for allowing me to participate in your patient's care and please call me with any questions or concerns.     Lance Torres MD  Infectious Disease  [facility] [place] ([facility]) Physician  Phone: [phone]   Fax : [phone]

## 2018-12-27 NOTE — DISCHARGE SUMMARY
as words and phrases that may be inappropriate. When dictating, effort is made to correct spelling/grammar errors. If there are any questions or concerns please feel free to contact the dictating provider for clarification. Signed:    Barry Chisholm MD   12/27/2018      Thank you Shayy Baer MD for the opportunity to be involved in this patient's care. If you have any questions or concerns please feel free to contact me at [phone].

## 2018-12-27 NOTE — PROGRESS NOTES
Pt alert and oriented. No c/o pain at this time. Pt enc to cough, deep breath, and call for assistance when needed. Fall precautions in place. Call light with in reach. Will cont to monitor.

## 2018-12-31 LAB
ALBUMIN SERPL-MCNC: 3.4 G/DL
ALP BLD-CCNC: 93 U/L
ALT SERPL-CCNC: 16 U/L
ANION GAP SERPL CALCULATED.3IONS-SCNC: NORMAL MMOL/L
AST SERPL-CCNC: 26 U/L
BASOPHILS ABSOLUTE: 0.1 /ΜL
BASOPHILS RELATIVE PERCENT: 0.9 %
BILIRUB SERPL-MCNC: 0.7 MG/DL (ref 0.1–1.4)
BUN BLDV-MCNC: 17 MG/DL
C-REACTIVE PROTEIN: 69
CALCIUM SERPL-MCNC: 8.6 MG/DL
CHLORIDE BLD-SCNC: 101 MMOL/L
CO2: 25 MMOL/L
CREAT SERPL-MCNC: 1.1 MG/DL
EOSINOPHILS ABSOLUTE: 0.1 /ΜL
EOSINOPHILS RELATIVE PERCENT: 1.4 %
GFR CALCULATED: NORMAL
GLUCOSE BLD-MCNC: 252 MG/DL
HCT VFR BLD CALC: 29.6 % (ref 41–53)
HEMOGLOBIN: 9.5 G/DL (ref 13.5–17.5)
LYMPHOCYTES ABSOLUTE: 0.7 /ΜL
LYMPHOCYTES RELATIVE PERCENT: 9.5 %
MCH RBC QN AUTO: 27 PG
MCHC RBC AUTO-ENTMCNC: 32.2 G/DL
MCV RBC AUTO: 83.8 FL
MONOCYTES ABSOLUTE: 0.6 /ΜL
MONOCYTES RELATIVE PERCENT: 8.2 %
NEUTROPHILS ABSOLUTE: 6.1 /ΜL
NEUTROPHILS RELATIVE PERCENT: 80 %
PLATELET # BLD: 262 K/ΜL
PMV BLD AUTO: 7.1 FL
POTASSIUM SERPL-SCNC: 4.3 MMOL/L
RBC # BLD: 3.53 10^6/ΜL
SODIUM BLD-SCNC: 139 MMOL/L
TOTAL PROTEIN: 6.6
WBC # BLD: 7.6 10^3/ML

## 2019-01-07 ENCOUNTER — TELEPHONE (OUTPATIENT)
Dept: SURGERY | Age: 59
End: 2019-01-07

## 2019-01-14 LAB
ALBUMIN SERPL-MCNC: 3.2 G/DL
ALP BLD-CCNC: 79 U/L
ALT SERPL-CCNC: 10 U/L
ANION GAP SERPL CALCULATED.3IONS-SCNC: NORMAL MMOL/L
AST SERPL-CCNC: 28 U/L
BASOPHILS ABSOLUTE: 0 /ΜL
BASOPHILS RELATIVE PERCENT: 0.9 %
BILIRUB SERPL-MCNC: 0.7 MG/DL (ref 0.1–1.4)
BUN BLDV-MCNC: 15 MG/DL
C-REACTIVE PROTEIN: 11.5
CALCIUM SERPL-MCNC: 8.9 MG/DL
CHLORIDE BLD-SCNC: 96 MMOL/L
CO2: 32 MMOL/L
CREAT SERPL-MCNC: 1.1 MG/DL
EOSINOPHILS ABSOLUTE: 0.1 /ΜL
EOSINOPHILS RELATIVE PERCENT: 2.1 %
GFR CALCULATED: NORMAL
GLUCOSE BLD-MCNC: 317 MG/DL
HCT VFR BLD CALC: 34.1 % (ref 41–53)
HEMOGLOBIN: 10.5 G/DL (ref 13.5–17.5)
LYMPHOCYTES ABSOLUTE: 1.1 /ΜL
LYMPHOCYTES RELATIVE PERCENT: 20.5 %
MCH RBC QN AUTO: 25.4 PG
MCHC RBC AUTO-ENTMCNC: 30.8 G/DL
MCV RBC AUTO: 82.3 FL
MONOCYTES ABSOLUTE: 0.5 /ΜL
MONOCYTES RELATIVE PERCENT: 8.5 %
NEUTROPHILS ABSOLUTE: 3.7 /ΜL
NEUTROPHILS RELATIVE PERCENT: 68 %
PLATELET # BLD: 141 K/ΜL
PMV BLD AUTO: 7.9 FL
POTASSIUM SERPL-SCNC: 4.7 MMOL/L
RBC # BLD: 4.14 10^6/ΜL
SEDIMENTATION RATE, ERYTHROCYTE: 21
SODIUM BLD-SCNC: 139 MMOL/L
TOTAL PROTEIN: 7.1
WBC # BLD: 5.4 10^3/ML

## 2019-01-23 ENCOUNTER — TELEPHONE (OUTPATIENT)
Dept: SURGERY | Age: 59
End: 2019-01-23

## 2019-01-23 NOTE — TELEPHONE ENCOUNTER
S/P Reversed greater saphenous vein femoral to above-knee popliteal bypass 12-18-18    Home nurse Darrick Terrazas called. When Darrick Terrazas goes to remove the sponge, patient is having tremendous amount of pain. He would like a verbal order for patient for some Vaseline adaptic to be placed PRIOR to the sponge going in wound. He changes wound vac MON/WED/FRI. Please call for verbal or new orders.  Thanks   Darrick Terrazas [phone]

## 2019-01-24 ENCOUNTER — TELEPHONE (OUTPATIENT)
Dept: SURGERY | Age: 59
End: 2019-01-24

## 2019-01-24 DIAGNOSIS — T14.8XXA WOUND INFECTION: Primary | ICD-10-CM

## 2019-01-24 DIAGNOSIS — L08.9 WOUND INFECTION: Primary | ICD-10-CM

## 2019-01-24 RX ORDER — LEVOFLOXACIN 500 MG/1
500 TABLET, FILM COATED ORAL DAILY
Qty: 21 TABLET | Refills: 0 | Status: ON HOLD | OUTPATIENT
Start: 2019-01-24 | End: 2019-02-14 | Stop reason: HOSPADM

## 2019-01-24 RX ORDER — METRONIDAZOLE 500 MG/1
500 TABLET ORAL DAILY
Qty: 21 TABLET | Refills: 0 | Status: ON HOLD | OUTPATIENT
Start: 2019-01-24 | End: 2019-02-14

## 2019-01-29 DIAGNOSIS — G89.18 ACUTE POST-OPERATIVE PAIN: Primary | ICD-10-CM

## 2019-01-29 DIAGNOSIS — G89.18 POST-OPERATIVE PAIN: Primary | ICD-10-CM

## 2019-01-29 RX ORDER — OXYCODONE HYDROCHLORIDE AND ACETAMINOPHEN 5; 325 MG/1; MG/1
1 TABLET ORAL EVERY 6 HOURS PRN
Qty: 28 TABLET | Refills: 0 | Status: ON HOLD | OUTPATIENT
Start: 2019-01-29 | End: 2019-02-14

## 2019-01-29 RX ORDER — OXYCODONE HYDROCHLORIDE AND ACETAMINOPHEN 5; 325 MG/1; MG/1
1 TABLET ORAL EVERY 6 HOURS PRN
Qty: 21 TABLET | Refills: 0 | Status: CANCELLED | OUTPATIENT
Start: 2019-01-29 | End: 2019-02-05

## 2019-01-29 NOTE — TELEPHONE ENCOUNTER
Pt is still having tremendous pain with drsg changes he needs the drsg changed at time of his visit on Friday he needs more pain medicine he also needs some adaptic cream he wants the MA to call him back in the am to speak to him about his list of items

## 2019-01-30 NOTE — TELEPHONE ENCOUNTER
Returned the call to the patient, he has been informed that the Rx for Oxycodone 5-325 mg will be here at the office for - he mentions that he does not have transportation to pick this up, he also will have a change of insurance beginning on 2/1/2019. He will also attempt to arrange for transportation with the new insurance for the upcoming appointment on Friday. He also mentions he has noticed the great toe as well as the second toes to the bilateral feet have lost the nail since having the procedure performed, he will have the home care nurse call for a verbal order to help care for the toes since he unable to reach his feet since the surgery he has had complications with bending over this causes him a great deal of pain.

## 2019-02-04 DIAGNOSIS — L08.9 WOUND INFECTION: ICD-10-CM

## 2019-02-04 DIAGNOSIS — S81.802A WOUNDS, MULTIPLE OPEN, LOWER EXTREMITY, LEFT, INITIAL ENCOUNTER: Primary | ICD-10-CM

## 2019-02-04 DIAGNOSIS — T14.8XXA WOUND INFECTION: ICD-10-CM

## 2019-02-04 NOTE — TELEPHONE ENCOUNTER
Call has been placed to [facility], have been advised the order needs to be sent to their office as well as office notes, opt notes, including patient demographics and insurance.   This information has been faxed to [facility] today as of 1030am

## 2019-02-05 ENCOUNTER — HOSPITAL ENCOUNTER (INPATIENT)
Age: 59
LOS: 9 days | Discharge: HOME HEALTH | DRG: 919 | End: 2019-02-14
Attending: EMERGENCY MEDICINE | Admitting: EMERGENCY MEDICINE
Payer: MEDICARE

## 2019-02-05 ENCOUNTER — APPOINTMENT (OUTPATIENT)
Dept: CT IMAGING | Age: 59
DRG: 919 | End: 2019-02-05
Payer: MEDICARE

## 2019-02-05 DIAGNOSIS — L02.419 CELLULITIS AND ABSCESS OF LEG: Primary | ICD-10-CM

## 2019-02-05 DIAGNOSIS — G47.00 INSOMNIA, UNSPECIFIED TYPE: ICD-10-CM

## 2019-02-05 DIAGNOSIS — T14.8XXA WOUND INFECTION: ICD-10-CM

## 2019-02-05 DIAGNOSIS — L03.119 CELLULITIS AND ABSCESS OF LEG: Primary | ICD-10-CM

## 2019-02-05 DIAGNOSIS — G89.18 POST-OPERATIVE PAIN: ICD-10-CM

## 2019-02-05 DIAGNOSIS — L08.9 WOUND INFECTION: ICD-10-CM

## 2019-02-05 PROBLEM — L76.34 POSTOPERATIVE SEROMA OF SUBCUTANEOUS TISSUE AFTER NON-DERMATOLOGIC PROCEDURE: Status: ACTIVE | Noted: 2019-02-05

## 2019-02-05 PROBLEM — S81.802A OPEN WOUND OF LEFT LOWER LEG: Status: ACTIVE | Noted: 2019-02-05

## 2019-02-05 LAB
A/G RATIO: 0.8 (ref 1.1–2.2)
ALBUMIN SERPL-MCNC: 3.3 G/DL (ref 3.4–5)
ALP BLD-CCNC: 103 U/L (ref 40–129)
ALT SERPL-CCNC: 10 U/L (ref 10–40)
ANION GAP SERPL CALCULATED.3IONS-SCNC: 17 MMOL/L (ref 3–16)
AST SERPL-CCNC: 22 U/L (ref 15–37)
BASOPHILS ABSOLUTE: 0 K/UL (ref 0–0.2)
BASOPHILS RELATIVE PERCENT: 0.7 %
BILIRUB SERPL-MCNC: 1 MG/DL (ref 0–1)
BUN BLDV-MCNC: 23 MG/DL (ref 7–20)
CALCIUM SERPL-MCNC: 8.9 MG/DL (ref 8.3–10.6)
CHLORIDE BLD-SCNC: 98 MMOL/L (ref 99–110)
CO2: 23 MMOL/L (ref 21–32)
CREAT SERPL-MCNC: 1.2 MG/DL (ref 0.9–1.3)
EOSINOPHILS ABSOLUTE: 0.1 K/UL (ref 0–0.6)
EOSINOPHILS RELATIVE PERCENT: 1.4 %
GFR AFRICAN AMERICAN: >60
GFR NON-AFRICAN AMERICAN: >60
GLOBULIN: 3.9 G/DL
GLUCOSE BLD-MCNC: 141 MG/DL (ref 70–99)
GLUCOSE BLD-MCNC: 176 MG/DL (ref 70–99)
GLUCOSE BLD-MCNC: 234 MG/DL (ref 70–99)
HCT VFR BLD CALC: 35.2 % (ref 40.5–52.5)
HEMOGLOBIN: 11 G/DL (ref 13.5–17.5)
LACTIC ACID: 1.7 MMOL/L (ref 0.4–2)
LYMPHOCYTES ABSOLUTE: 0.9 K/UL (ref 1–5.1)
LYMPHOCYTES RELATIVE PERCENT: 16.7 %
MCH RBC QN AUTO: 25.2 PG (ref 26–34)
MCHC RBC AUTO-ENTMCNC: 31.3 G/DL (ref 31–36)
MCV RBC AUTO: 80.6 FL (ref 80–100)
MONOCYTES ABSOLUTE: 0.5 K/UL (ref 0–1.3)
MONOCYTES RELATIVE PERCENT: 9.7 %
NEUTROPHILS ABSOLUTE: 3.9 K/UL (ref 1.7–7.7)
NEUTROPHILS RELATIVE PERCENT: 71.5 %
PDW BLD-RTO: 20.5 % (ref 12.4–15.4)
PERFORMED ON: ABNORMAL
PERFORMED ON: ABNORMAL
PLATELET # BLD: 238 K/UL (ref 135–450)
PMV BLD AUTO: 7.8 FL (ref 5–10.5)
POTASSIUM SERPL-SCNC: 3.2 MMOL/L (ref 3.5–5.1)
RBC # BLD: 4.37 M/UL (ref 4.2–5.9)
SODIUM BLD-SCNC: 138 MMOL/L (ref 136–145)
TOTAL PROTEIN: 7.2 G/DL (ref 6.4–8.2)
WBC # BLD: 5.5 K/UL (ref 4–11)

## 2019-02-05 PROCEDURE — 99221 1ST HOSP IP/OBS SF/LOW 40: CPT | Performed by: SURGERY

## 2019-02-05 PROCEDURE — 2580000003 HC RX 258: Performed by: NURSE PRACTITIONER

## 2019-02-05 PROCEDURE — 1200000000 HC SEMI PRIVATE

## 2019-02-05 PROCEDURE — 6370000000 HC RX 637 (ALT 250 FOR IP): Performed by: NURSE PRACTITIONER

## 2019-02-05 PROCEDURE — 99285 EMERGENCY DEPT VISIT HI MDM: CPT

## 2019-02-05 PROCEDURE — 80053 COMPREHEN METABOLIC PANEL: CPT

## 2019-02-05 PROCEDURE — APPNB180 APP NON BILLABLE TIME > 60 MINS: Performed by: PHYSICIAN ASSISTANT

## 2019-02-05 PROCEDURE — 36415 COLL VENOUS BLD VENIPUNCTURE: CPT

## 2019-02-05 PROCEDURE — 6360000002 HC RX W HCPCS: Performed by: NURSE PRACTITIONER

## 2019-02-05 PROCEDURE — 96374 THER/PROPH/DIAG INJ IV PUSH: CPT

## 2019-02-05 PROCEDURE — 83605 ASSAY OF LACTIC ACID: CPT

## 2019-02-05 PROCEDURE — 85025 COMPLETE CBC W/AUTO DIFF WBC: CPT

## 2019-02-05 PROCEDURE — APPSS180 APP SPLIT SHARED TIME > 60 MINUTES: Performed by: PHYSICIAN ASSISTANT

## 2019-02-05 PROCEDURE — 87040 BLOOD CULTURE FOR BACTERIA: CPT

## 2019-02-05 PROCEDURE — 99255 IP/OBS CONSLTJ NEW/EST HI 80: CPT | Performed by: INTERNAL MEDICINE

## 2019-02-05 PROCEDURE — 6360000002 HC RX W HCPCS: Performed by: EMERGENCY MEDICINE

## 2019-02-05 PROCEDURE — 93005 ELECTROCARDIOGRAM TRACING: CPT | Performed by: EMERGENCY MEDICINE

## 2019-02-05 PROCEDURE — 73701 CT LOWER EXTREMITY W/DYE: CPT

## 2019-02-05 PROCEDURE — 6360000004 HC RX CONTRAST MEDICATION: Performed by: EMERGENCY MEDICINE

## 2019-02-05 RX ORDER — INSULIN GLARGINE 100 [IU]/ML
12 INJECTION, SOLUTION SUBCUTANEOUS DAILY
Status: DISCONTINUED | OUTPATIENT
Start: 2019-02-05 | End: 2019-02-11

## 2019-02-05 RX ORDER — SODIUM CHLORIDE 0.9 % (FLUSH) 0.9 %
10 SYRINGE (ML) INJECTION PRN
Status: DISCONTINUED | OUTPATIENT
Start: 2019-02-05 | End: 2019-02-15 | Stop reason: HOSPADM

## 2019-02-05 RX ORDER — NICOTINE POLACRILEX 4 MG
15 LOZENGE BUCCAL PRN
Status: DISCONTINUED | OUTPATIENT
Start: 2019-02-05 | End: 2019-02-15 | Stop reason: HOSPADM

## 2019-02-05 RX ORDER — FUROSEMIDE 40 MG/1
40 TABLET ORAL DAILY
Status: DISCONTINUED | OUTPATIENT
Start: 2019-02-05 | End: 2019-02-15 | Stop reason: HOSPADM

## 2019-02-05 RX ORDER — EMTRICITABINE AND TENOFOVIR DISOPROXIL FUMARATE 200; 300 MG/1; MG/1
1 TABLET, FILM COATED ORAL EVERY MORNING
Status: DISCONTINUED | OUTPATIENT
Start: 2019-02-06 | End: 2019-02-15 | Stop reason: HOSPADM

## 2019-02-05 RX ORDER — OXYCODONE HYDROCHLORIDE AND ACETAMINOPHEN 5; 325 MG/1; MG/1
2 TABLET ORAL EVERY 6 HOURS PRN
Status: DISCONTINUED | OUTPATIENT
Start: 2019-02-05 | End: 2019-02-11

## 2019-02-05 RX ORDER — FLUTICASONE PROPIONATE 50 MCG
1 SPRAY, SUSPENSION (ML) NASAL DAILY
Status: DISCONTINUED | OUTPATIENT
Start: 2019-02-06 | End: 2019-02-15 | Stop reason: HOSPADM

## 2019-02-05 RX ORDER — ONDANSETRON 2 MG/ML
4 INJECTION INTRAMUSCULAR; INTRAVENOUS EVERY 6 HOURS PRN
Status: DISCONTINUED | OUTPATIENT
Start: 2019-02-05 | End: 2019-02-15 | Stop reason: HOSPADM

## 2019-02-05 RX ORDER — DEXTROSE MONOHYDRATE 25 G/50ML
12.5 INJECTION, SOLUTION INTRAVENOUS PRN
Status: DISCONTINUED | OUTPATIENT
Start: 2019-02-05 | End: 2019-02-15 | Stop reason: HOSPADM

## 2019-02-05 RX ORDER — ZOLPIDEM TARTRATE 5 MG/1
10 TABLET ORAL NIGHTLY PRN
Status: DISCONTINUED | OUTPATIENT
Start: 2019-02-05 | End: 2019-02-15 | Stop reason: HOSPADM

## 2019-02-05 RX ORDER — DEXTROSE MONOHYDRATE 25 G/50ML
12.5 INJECTION, SOLUTION INTRAVENOUS PRN
Status: DISCONTINUED | OUTPATIENT
Start: 2019-02-05 | End: 2019-02-05 | Stop reason: SDUPTHER

## 2019-02-05 RX ORDER — PANTOPRAZOLE SODIUM 40 MG/1
40 TABLET, DELAYED RELEASE ORAL
Status: DISCONTINUED | OUTPATIENT
Start: 2019-02-05 | End: 2019-02-15 | Stop reason: HOSPADM

## 2019-02-05 RX ORDER — DIGOXIN 125 MCG
125 TABLET ORAL DAILY
Status: DISCONTINUED | OUTPATIENT
Start: 2019-02-05 | End: 2019-02-15 | Stop reason: HOSPADM

## 2019-02-05 RX ORDER — METOPROLOL SUCCINATE 25 MG/1
12.5 TABLET, EXTENDED RELEASE ORAL DAILY
Status: DISCONTINUED | OUTPATIENT
Start: 2019-02-05 | End: 2019-02-15 | Stop reason: HOSPADM

## 2019-02-05 RX ORDER — MONTELUKAST SODIUM 10 MG/1
10 TABLET ORAL NIGHTLY
Status: DISCONTINUED | OUTPATIENT
Start: 2019-02-05 | End: 2019-02-15 | Stop reason: HOSPADM

## 2019-02-05 RX ORDER — ARIPIPRAZOLE 2 MG/1
2 TABLET ORAL NIGHTLY
Status: DISCONTINUED | OUTPATIENT
Start: 2019-02-05 | End: 2019-02-15 | Stop reason: HOSPADM

## 2019-02-05 RX ORDER — MORPHINE SULFATE 2 MG/ML
4 INJECTION, SOLUTION INTRAMUSCULAR; INTRAVENOUS ONCE
Status: COMPLETED | OUTPATIENT
Start: 2019-02-05 | End: 2019-02-05

## 2019-02-05 RX ORDER — DEXTROSE MONOHYDRATE 50 MG/ML
100 INJECTION, SOLUTION INTRAVENOUS PRN
Status: DISCONTINUED | OUTPATIENT
Start: 2019-02-05 | End: 2019-02-05 | Stop reason: SDUPTHER

## 2019-02-05 RX ORDER — DEXTROSE MONOHYDRATE 50 MG/ML
100 INJECTION, SOLUTION INTRAVENOUS PRN
Status: DISCONTINUED | OUTPATIENT
Start: 2019-02-05 | End: 2019-02-15 | Stop reason: HOSPADM

## 2019-02-05 RX ORDER — SIMETHICONE 80 MG
125 TABLET,CHEWABLE ORAL 4 TIMES DAILY PRN
Status: DISCONTINUED | OUTPATIENT
Start: 2019-02-05 | End: 2019-02-15 | Stop reason: HOSPADM

## 2019-02-05 RX ORDER — ASPIRIN 81 MG/1
81 TABLET, CHEWABLE ORAL DAILY
Status: DISCONTINUED | OUTPATIENT
Start: 2019-02-05 | End: 2019-02-15 | Stop reason: HOSPADM

## 2019-02-05 RX ORDER — NICOTINE POLACRILEX 4 MG
15 LOZENGE BUCCAL PRN
Status: DISCONTINUED | OUTPATIENT
Start: 2019-02-05 | End: 2019-02-05 | Stop reason: SDUPTHER

## 2019-02-05 RX ORDER — ATORVASTATIN CALCIUM 80 MG/1
80 TABLET, FILM COATED ORAL NIGHTLY
Status: DISCONTINUED | OUTPATIENT
Start: 2019-02-05 | End: 2019-02-15 | Stop reason: HOSPADM

## 2019-02-05 RX ORDER — SODIUM CHLORIDE 0.9 % (FLUSH) 0.9 %
10 SYRINGE (ML) INJECTION EVERY 12 HOURS SCHEDULED
Status: DISCONTINUED | OUTPATIENT
Start: 2019-02-05 | End: 2019-02-15 | Stop reason: HOSPADM

## 2019-02-05 RX ADMIN — METOPROLOL SUCCINATE 12.5 MG: 25 TABLET, EXTENDED RELEASE ORAL at 16:56

## 2019-02-05 RX ADMIN — DIGOXIN 125 MCG: 125 TABLET ORAL at 16:56

## 2019-02-05 RX ADMIN — FUROSEMIDE 40 MG: 40 TABLET ORAL at 16:56

## 2019-02-05 RX ADMIN — MIRTAZAPINE 45 MG: 30 TABLET, FILM COATED ORAL at 20:55

## 2019-02-05 RX ADMIN — INSULIN LISPRO 1 UNITS: 100 INJECTION, SOLUTION INTRAVENOUS; SUBCUTANEOUS at 22:07

## 2019-02-05 RX ADMIN — IOPAMIDOL 75 ML: 755 INJECTION, SOLUTION INTRAVENOUS at 09:53

## 2019-02-05 RX ADMIN — VANCOMYCIN HYDROCHLORIDE 1000 MG: 1 INJECTION, POWDER, LYOPHILIZED, FOR SOLUTION INTRAVENOUS at 18:54

## 2019-02-05 RX ADMIN — CEFAZOLIN 1 G: 1 INJECTION, POWDER, FOR SOLUTION INTRAMUSCULAR; INTRAVENOUS; PARENTERAL at 16:56

## 2019-02-05 RX ADMIN — ATORVASTATIN CALCIUM 80 MG: 80 TABLET, FILM COATED ORAL at 20:55

## 2019-02-05 RX ADMIN — INSULIN LISPRO 4 UNITS: 100 INJECTION, SOLUTION INTRAVENOUS; SUBCUTANEOUS at 18:08

## 2019-02-05 RX ADMIN — MORPHINE SULFATE 4 MG: 2 INJECTION, SOLUTION INTRAMUSCULAR; INTRAVENOUS at 08:29

## 2019-02-05 RX ADMIN — INSULIN LISPRO 2 UNITS: 100 INJECTION, SOLUTION INTRAVENOUS; SUBCUTANEOUS at 18:07

## 2019-02-05 RX ADMIN — ASPIRIN 81 MG 81 MG: 81 TABLET ORAL at 16:56

## 2019-02-05 RX ADMIN — Medication 10 ML: at 20:55

## 2019-02-05 RX ADMIN — HYDROMORPHONE HYDROCHLORIDE 0.5 MG: 1 INJECTION, SOLUTION INTRAMUSCULAR; INTRAVENOUS; SUBCUTANEOUS at 18:04

## 2019-02-05 RX ADMIN — ARIPIPRAZOLE 2 MG: 2 TABLET ORAL at 20:55

## 2019-02-05 RX ADMIN — PANTOPRAZOLE SODIUM 40 MG: 40 TABLET, DELAYED RELEASE ORAL at 16:56

## 2019-02-05 RX ADMIN — INSULIN GLARGINE 12 UNITS: 100 INJECTION, SOLUTION SUBCUTANEOUS at 18:05

## 2019-02-05 RX ADMIN — MONTELUKAST SODIUM 10 MG: 10 TABLET, COATED ORAL at 20:55

## 2019-02-05 ASSESSMENT — PAIN SCALES - GENERAL
PAINLEVEL_OUTOF10: 8
PAINLEVEL_OUTOF10: 6
PAINLEVEL_OUTOF10: 3
PAINLEVEL_OUTOF10: 6
PAINLEVEL_OUTOF10: 3
PAINLEVEL_OUTOF10: 8
PAINLEVEL_OUTOF10: 3
PAINLEVEL_OUTOF10: 4
PAINLEVEL_OUTOF10: 8

## 2019-02-05 ASSESSMENT — PAIN - FUNCTIONAL ASSESSMENT: PAIN_FUNCTIONAL_ASSESSMENT: PREVENTS OR INTERFERES SOME ACTIVE ACTIVITIES AND ADLS

## 2019-02-05 ASSESSMENT — PAIN DESCRIPTION - PROGRESSION
CLINICAL_PROGRESSION: NOT CHANGED
CLINICAL_PROGRESSION: NOT CHANGED

## 2019-02-05 ASSESSMENT — PAIN DESCRIPTION - ORIENTATION
ORIENTATION: LEFT
ORIENTATION: LEFT

## 2019-02-05 ASSESSMENT — PAIN DESCRIPTION - PAIN TYPE
TYPE: CHRONIC PAIN

## 2019-02-05 ASSESSMENT — PAIN DESCRIPTION - DESCRIPTORS: DESCRIPTORS: ACHING;BURNING;CONSTANT

## 2019-02-05 ASSESSMENT — PAIN DESCRIPTION - LOCATION
LOCATION: LEG
LOCATION: LEG

## 2019-02-05 ASSESSMENT — PAIN DESCRIPTION - ONSET: ONSET: ON-GOING

## 2019-02-05 ASSESSMENT — PAIN DESCRIPTION - FREQUENCY: FREQUENCY: CONTINUOUS

## 2019-02-05 ASSESSMENT — PAIN DESCRIPTION - DIRECTION: RADIATING_TOWARDS: UPPER THIGH

## 2019-02-06 PROBLEM — E43 SEVERE MALNUTRITION (HCC): Chronic | Status: ACTIVE | Noted: 2019-02-06

## 2019-02-06 LAB
A/G RATIO: 0.8 (ref 1.1–2.2)
ALBUMIN SERPL-MCNC: 3 G/DL (ref 3.4–5)
ALP BLD-CCNC: 98 U/L (ref 40–129)
ALT SERPL-CCNC: 9 U/L (ref 10–40)
ANION GAP SERPL CALCULATED.3IONS-SCNC: 15 MMOL/L (ref 3–16)
AST SERPL-CCNC: 22 U/L (ref 15–37)
BILIRUB SERPL-MCNC: 0.7 MG/DL (ref 0–1)
BUN BLDV-MCNC: 28 MG/DL (ref 7–20)
C-REACTIVE PROTEIN: 34.9 MG/L (ref 0–5.1)
CALCIUM SERPL-MCNC: 8.6 MG/DL (ref 8.3–10.6)
CHLORIDE BLD-SCNC: 99 MMOL/L (ref 99–110)
CO2: 24 MMOL/L (ref 21–32)
CREAT SERPL-MCNC: 1.3 MG/DL (ref 0.9–1.3)
EKG ATRIAL RATE: 90 BPM
EKG DIAGNOSIS: NORMAL
EKG Q-T INTERVAL: 390 MS
EKG QRS DURATION: 92 MS
EKG QTC CALCULATION (BAZETT): 479 MS
EKG R AXIS: -72 DEGREES
EKG T AXIS: 129 DEGREES
EKG VENTRICULAR RATE: 91 BPM
GFR AFRICAN AMERICAN: >60
GFR NON-AFRICAN AMERICAN: 57
GLOBULIN: 3.7 G/DL
GLUCOSE BLD-MCNC: 105 MG/DL (ref 70–99)
GLUCOSE BLD-MCNC: 125 MG/DL (ref 70–99)
GLUCOSE BLD-MCNC: 164 MG/DL (ref 70–99)
GLUCOSE BLD-MCNC: 167 MG/DL (ref 70–99)
GLUCOSE BLD-MCNC: 216 MG/DL (ref 70–99)
GLUCOSE BLD-MCNC: 239 MG/DL (ref 70–99)
HCT VFR BLD CALC: 35.1 % (ref 40.5–52.5)
HEMOGLOBIN: 11 G/DL (ref 13.5–17.5)
INR BLD: 1.63 (ref 0.86–1.14)
MAGNESIUM: 1.8 MG/DL (ref 1.8–2.4)
MCH RBC QN AUTO: 25.2 PG (ref 26–34)
MCHC RBC AUTO-ENTMCNC: 31.3 G/DL (ref 31–36)
MCV RBC AUTO: 80.6 FL (ref 80–100)
PDW BLD-RTO: 20.6 % (ref 12.4–15.4)
PERFORMED ON: ABNORMAL
PLATELET # BLD: 257 K/UL (ref 135–450)
PMV BLD AUTO: 7.9 FL (ref 5–10.5)
POTASSIUM REFLEX MAGNESIUM: 3.5 MMOL/L (ref 3.5–5.1)
PROTHROMBIN TIME: 18.6 SEC (ref 9.8–13)
RBC # BLD: 4.36 M/UL (ref 4.2–5.9)
SEDIMENTATION RATE, ERYTHROCYTE: 23 MM/HR (ref 0–20)
SODIUM BLD-SCNC: 138 MMOL/L (ref 136–145)
TOTAL PROTEIN: 6.7 G/DL (ref 6.4–8.2)
WBC # BLD: 5.7 K/UL (ref 4–11)

## 2019-02-06 PROCEDURE — 6360000002 HC RX W HCPCS: Performed by: NURSE PRACTITIONER

## 2019-02-06 PROCEDURE — 99232 SBSQ HOSP IP/OBS MODERATE 35: CPT | Performed by: INTERNAL MEDICINE

## 2019-02-06 PROCEDURE — 99232 SBSQ HOSP IP/OBS MODERATE 35: CPT | Performed by: SURGERY

## 2019-02-06 PROCEDURE — 83735 ASSAY OF MAGNESIUM: CPT

## 2019-02-06 PROCEDURE — 1200000000 HC SEMI PRIVATE

## 2019-02-06 PROCEDURE — 6370000000 HC RX 637 (ALT 250 FOR IP): Performed by: NURSE PRACTITIONER

## 2019-02-06 PROCEDURE — 85610 PROTHROMBIN TIME: CPT

## 2019-02-06 PROCEDURE — 94762 N-INVAS EAR/PLS OXIMTRY CONT: CPT

## 2019-02-06 PROCEDURE — 2580000003 HC RX 258: Performed by: NURSE PRACTITIONER

## 2019-02-06 PROCEDURE — 36415 COLL VENOUS BLD VENIPUNCTURE: CPT

## 2019-02-06 PROCEDURE — 97606 NEG PRS WND THER DME>50 SQCM: CPT | Performed by: NURSE PRACTITIONER

## 2019-02-06 PROCEDURE — 85652 RBC SED RATE AUTOMATED: CPT

## 2019-02-06 PROCEDURE — 85027 COMPLETE CBC AUTOMATED: CPT

## 2019-02-06 PROCEDURE — 86140 C-REACTIVE PROTEIN: CPT

## 2019-02-06 PROCEDURE — 93010 ELECTROCARDIOGRAM REPORT: CPT | Performed by: INTERNAL MEDICINE

## 2019-02-06 PROCEDURE — 80053 COMPREHEN METABOLIC PANEL: CPT

## 2019-02-06 RX ADMIN — ASPIRIN 81 MG 81 MG: 81 TABLET ORAL at 08:13

## 2019-02-06 RX ADMIN — Medication 10 ML: at 10:10

## 2019-02-06 RX ADMIN — DIGOXIN 125 MCG: 125 TABLET ORAL at 08:14

## 2019-02-06 RX ADMIN — OXYCODONE AND ACETAMINOPHEN 2 TABLET: 5; 325 TABLET ORAL at 13:20

## 2019-02-06 RX ADMIN — MIRTAZAPINE 45 MG: 30 TABLET, FILM COATED ORAL at 22:12

## 2019-02-06 RX ADMIN — METOPROLOL SUCCINATE 12.5 MG: 25 TABLET, EXTENDED RELEASE ORAL at 08:13

## 2019-02-06 RX ADMIN — MICONAZOLE NITRATE 1 APPLICATOR: 20 CREAM TOPICAL at 22:04

## 2019-02-06 RX ADMIN — INSULIN LISPRO 4 UNITS: 100 INJECTION, SOLUTION INTRAVENOUS; SUBCUTANEOUS at 08:20

## 2019-02-06 RX ADMIN — INSULIN LISPRO 4 UNITS: 100 INJECTION, SOLUTION INTRAVENOUS; SUBCUTANEOUS at 19:02

## 2019-02-06 RX ADMIN — INSULIN LISPRO 4 UNITS: 100 INJECTION, SOLUTION INTRAVENOUS; SUBCUTANEOUS at 08:22

## 2019-02-06 RX ADMIN — INSULIN GLARGINE 12 UNITS: 100 INJECTION, SOLUTION SUBCUTANEOUS at 10:08

## 2019-02-06 RX ADMIN — FLUTICASONE PROPIONATE 1 SPRAY: 50 SPRAY, METERED NASAL at 08:14

## 2019-02-06 RX ADMIN — EMTRICITABINE AND TENOFOVIR DISOPROXIL FUMARATE 1 TABLET: 200; 300 TABLET, FILM COATED ORAL at 08:14

## 2019-02-06 RX ADMIN — OXYCODONE AND ACETAMINOPHEN 2 TABLET: 5; 325 TABLET ORAL at 01:34

## 2019-02-06 RX ADMIN — ARIPIPRAZOLE 2 MG: 2 TABLET ORAL at 22:11

## 2019-02-06 RX ADMIN — DOLUTEGRAVIR SODIUM 50 MG: 50 TABLET, FILM COATED ORAL at 08:14

## 2019-02-06 RX ADMIN — MONTELUKAST SODIUM 10 MG: 10 TABLET, COATED ORAL at 22:11

## 2019-02-06 RX ADMIN — ATORVASTATIN CALCIUM 80 MG: 80 TABLET, FILM COATED ORAL at 22:12

## 2019-02-06 RX ADMIN — OXYCODONE AND ACETAMINOPHEN 2 TABLET: 5; 325 TABLET ORAL at 19:35

## 2019-02-06 RX ADMIN — ONDANSETRON 4 MG: 2 INJECTION INTRAMUSCULAR; INTRAVENOUS at 20:31

## 2019-02-06 RX ADMIN — FUROSEMIDE 40 MG: 40 TABLET ORAL at 08:14

## 2019-02-06 RX ADMIN — Medication 10 ML: at 21:00

## 2019-02-06 RX ADMIN — PANTOPRAZOLE SODIUM 40 MG: 40 TABLET, DELAYED RELEASE ORAL at 06:36

## 2019-02-06 ASSESSMENT — PAIN SCALES - GENERAL
PAINLEVEL_OUTOF10: 3
PAINLEVEL_OUTOF10: 9
PAINLEVEL_OUTOF10: 8
PAINLEVEL_OUTOF10: 6
PAINLEVEL_OUTOF10: 9
PAINLEVEL_OUTOF10: 0
PAINLEVEL_OUTOF10: 6

## 2019-02-06 ASSESSMENT — PAIN DESCRIPTION - DESCRIPTORS
DESCRIPTORS: ACHING;CONSTANT
DESCRIPTORS: ACHING
DESCRIPTORS: ACHING;CONSTANT
DESCRIPTORS: ACHING;CONSTANT

## 2019-02-06 ASSESSMENT — PAIN DESCRIPTION - FREQUENCY
FREQUENCY: CONTINUOUS

## 2019-02-06 ASSESSMENT — PAIN DESCRIPTION - PROGRESSION
CLINICAL_PROGRESSION: NOT CHANGED

## 2019-02-06 ASSESSMENT — PAIN DESCRIPTION - PAIN TYPE
TYPE: CHRONIC PAIN

## 2019-02-06 ASSESSMENT — PAIN DESCRIPTION - LOCATION
LOCATION: LEG

## 2019-02-06 ASSESSMENT — PAIN DESCRIPTION - ORIENTATION
ORIENTATION: LEFT

## 2019-02-06 ASSESSMENT — PAIN - FUNCTIONAL ASSESSMENT
PAIN_FUNCTIONAL_ASSESSMENT: PREVENTS OR INTERFERES SOME ACTIVE ACTIVITIES AND ADLS
PAIN_FUNCTIONAL_ASSESSMENT: PREVENTS OR INTERFERES SOME ACTIVE ACTIVITIES AND ADLS

## 2019-02-06 ASSESSMENT — PAIN DESCRIPTION - ONSET
ONSET: ON-GOING

## 2019-02-07 ENCOUNTER — APPOINTMENT (OUTPATIENT)
Dept: ULTRASOUND IMAGING | Age: 59
DRG: 919 | End: 2019-02-07
Payer: MEDICARE

## 2019-02-07 ENCOUNTER — APPOINTMENT (OUTPATIENT)
Dept: CT IMAGING | Age: 59
DRG: 919 | End: 2019-02-07
Payer: MEDICARE

## 2019-02-07 LAB
APPEARANCE FLUID: NORMAL
APPEARANCE FLUID: NORMAL
CELL COUNT FLUID TYPE: NORMAL
CELL COUNT FLUID TYPE: NORMAL
CLOT EVALUATION: NORMAL
CLOT EVALUATION: NORMAL
COLOR FLUID: NORMAL
COLOR FLUID: YELLOW
GLUCOSE BLD-MCNC: 260 MG/DL (ref 70–99)
GLUCOSE BLD-MCNC: 269 MG/DL (ref 70–99)
GLUCOSE BLD-MCNC: 312 MG/DL (ref 70–99)
GLUCOSE BLD-MCNC: 84 MG/DL (ref 70–99)
INR BLD: 1.46 (ref 0.86–1.14)
LYMPHOCYTES, BODY FLUID: 81 %
LYMPHOCYTES, BODY FLUID: 93 %
MACROPHAGE FLUID: 1 %
MACROPHAGE FLUID: 2 %
MONOCYTE, FLUID: 4 %
NEUTROPHIL, FLUID: 1 %
NEUTROPHIL, FLUID: 18 %
NUCLEATED CELLS FLUID: 176 /CUMM
NUCLEATED CELLS FLUID: 338 /CUMM
NUMBER OF CELLS COUNTED FLUID: 100
NUMBER OF CELLS COUNTED FLUID: 100
PERFORMED ON: ABNORMAL
PERFORMED ON: NORMAL
PROTHROMBIN TIME: 16.7 SEC (ref 9.8–13)
RBC FLUID: 701 /CUMM
RBC FLUID: NORMAL /CUMM

## 2019-02-07 PROCEDURE — APPSS30 APP SPLIT SHARED TIME 16-30 MINUTES: Performed by: NURSE PRACTITIONER

## 2019-02-07 PROCEDURE — 10160 PNXR ASPIR ABSC HMTMA BULLA: CPT

## 2019-02-07 PROCEDURE — 89051 BODY FLUID CELL COUNT: CPT

## 2019-02-07 PROCEDURE — 0Y9D30Z DRAINAGE OF LEFT UPPER LEG WITH DRAINAGE DEVICE, PERCUTANEOUS APPROACH: ICD-10-PCS | Performed by: RADIOLOGY

## 2019-02-07 PROCEDURE — 36415 COLL VENOUS BLD VENIPUNCTURE: CPT

## 2019-02-07 PROCEDURE — 87075 CULTR BACTERIA EXCEPT BLOOD: CPT

## 2019-02-07 PROCEDURE — 99232 SBSQ HOSP IP/OBS MODERATE 35: CPT | Performed by: SURGERY

## 2019-02-07 PROCEDURE — 85610 PROTHROMBIN TIME: CPT

## 2019-02-07 PROCEDURE — 1200000000 HC SEMI PRIVATE

## 2019-02-07 PROCEDURE — 87205 SMEAR GRAM STAIN: CPT

## 2019-02-07 PROCEDURE — 6360000002 HC RX W HCPCS: Performed by: RADIOLOGY

## 2019-02-07 PROCEDURE — 2580000003 HC RX 258: Performed by: NURSE PRACTITIONER

## 2019-02-07 PROCEDURE — 94762 N-INVAS EAR/PLS OXIMTRY CONT: CPT

## 2019-02-07 PROCEDURE — APPNB30 APP NON BILLABLE TIME 0-30 MINS: Performed by: NURSE PRACTITIONER

## 2019-02-07 PROCEDURE — 2709999900 US ABSCESS DRAINAGE W CATH S&I

## 2019-02-07 PROCEDURE — 6360000002 HC RX W HCPCS: Performed by: NURSE PRACTITIONER

## 2019-02-07 PROCEDURE — 87070 CULTURE OTHR SPECIMN AEROBIC: CPT

## 2019-02-07 PROCEDURE — 6370000000 HC RX 637 (ALT 250 FOR IP): Performed by: NURSE PRACTITIONER

## 2019-02-07 RX ORDER — MIDAZOLAM HYDROCHLORIDE 1 MG/ML
INJECTION INTRAMUSCULAR; INTRAVENOUS DAILY PRN
Status: COMPLETED | OUTPATIENT
Start: 2019-02-07 | End: 2019-02-07

## 2019-02-07 RX ORDER — FENTANYL CITRATE 50 UG/ML
INJECTION, SOLUTION INTRAMUSCULAR; INTRAVENOUS DAILY PRN
Status: COMPLETED | OUTPATIENT
Start: 2019-02-07 | End: 2019-02-07

## 2019-02-07 RX ADMIN — MIRTAZAPINE 45 MG: 30 TABLET, FILM COATED ORAL at 20:45

## 2019-02-07 RX ADMIN — PIPERACILLIN SODIUM,TAZOBACTAM SODIUM 3.38 G: 3; .375 INJECTION, POWDER, FOR SOLUTION INTRAVENOUS at 17:17

## 2019-02-07 RX ADMIN — MONTELUKAST SODIUM 10 MG: 10 TABLET, COATED ORAL at 20:45

## 2019-02-07 RX ADMIN — OXYCODONE AND ACETAMINOPHEN 2 TABLET: 5; 325 TABLET ORAL at 04:36

## 2019-02-07 RX ADMIN — INSULIN LISPRO 8 UNITS: 100 INJECTION, SOLUTION INTRAVENOUS; SUBCUTANEOUS at 11:56

## 2019-02-07 RX ADMIN — MICONAZOLE NITRATE: 20 CREAM TOPICAL at 20:47

## 2019-02-07 RX ADMIN — EMTRICITABINE AND TENOFOVIR DISOPROXIL FUMARATE 1 TABLET: 200; 300 TABLET, FILM COATED ORAL at 16:09

## 2019-02-07 RX ADMIN — DOLUTEGRAVIR SODIUM 50 MG: 50 TABLET, FILM COATED ORAL at 16:09

## 2019-02-07 RX ADMIN — ARIPIPRAZOLE 2 MG: 2 TABLET ORAL at 20:45

## 2019-02-07 RX ADMIN — PANTOPRAZOLE SODIUM 40 MG: 40 TABLET, DELAYED RELEASE ORAL at 06:39

## 2019-02-07 RX ADMIN — FLUTICASONE PROPIONATE 1 SPRAY: 50 SPRAY, METERED NASAL at 09:19

## 2019-02-07 RX ADMIN — FENTANYL CITRATE 50 MCG: 50 INJECTION INTRAMUSCULAR; INTRAVENOUS at 13:58

## 2019-02-07 RX ADMIN — METOPROLOL SUCCINATE 12.5 MG: 25 TABLET, EXTENDED RELEASE ORAL at 09:18

## 2019-02-07 RX ADMIN — FUROSEMIDE 40 MG: 40 TABLET ORAL at 16:09

## 2019-02-07 RX ADMIN — ASPIRIN 81 MG 81 MG: 81 TABLET ORAL at 16:09

## 2019-02-07 RX ADMIN — MIDAZOLAM 1 MG: 1 INJECTION INTRAMUSCULAR; INTRAVENOUS at 13:58

## 2019-02-07 RX ADMIN — DIGOXIN 125 MCG: 125 TABLET ORAL at 09:17

## 2019-02-07 RX ADMIN — INSULIN LISPRO 3 UNITS: 100 INJECTION, SOLUTION INTRAVENOUS; SUBCUTANEOUS at 21:42

## 2019-02-07 RX ADMIN — OXYCODONE AND ACETAMINOPHEN 2 TABLET: 5; 325 TABLET ORAL at 16:08

## 2019-02-07 RX ADMIN — ATORVASTATIN CALCIUM 80 MG: 80 TABLET, FILM COATED ORAL at 20:45

## 2019-02-07 RX ADMIN — OXYCODONE AND ACETAMINOPHEN 2 TABLET: 5; 325 TABLET ORAL at 22:28

## 2019-02-07 RX ADMIN — INSULIN GLARGINE 12 UNITS: 100 INJECTION, SOLUTION SUBCUTANEOUS at 09:19

## 2019-02-07 ASSESSMENT — PAIN DESCRIPTION - PROGRESSION
CLINICAL_PROGRESSION: NOT CHANGED
CLINICAL_PROGRESSION: GRADUALLY IMPROVING
CLINICAL_PROGRESSION: NOT CHANGED

## 2019-02-07 ASSESSMENT — PAIN - FUNCTIONAL ASSESSMENT
PAIN_FUNCTIONAL_ASSESSMENT: PREVENTS OR INTERFERES SOME ACTIVE ACTIVITIES AND ADLS
PAIN_FUNCTIONAL_ASSESSMENT: ACTIVITIES ARE NOT PREVENTED
PAIN_FUNCTIONAL_ASSESSMENT: PREVENTS OR INTERFERES SOME ACTIVE ACTIVITIES AND ADLS

## 2019-02-07 ASSESSMENT — PAIN DESCRIPTION - PAIN TYPE
TYPE: SURGICAL PAIN
TYPE: CHRONIC PAIN
TYPE: SURGICAL PAIN

## 2019-02-07 ASSESSMENT — PAIN DESCRIPTION - ONSET
ONSET: ON-GOING

## 2019-02-07 ASSESSMENT — PAIN DESCRIPTION - FREQUENCY
FREQUENCY: CONTINUOUS

## 2019-02-07 ASSESSMENT — PAIN DESCRIPTION - DESCRIPTORS
DESCRIPTORS: ACHING;DISCOMFORT
DESCRIPTORS: ACHING
DESCRIPTORS: ACHING;DISCOMFORT
DESCRIPTORS: ACHING

## 2019-02-07 ASSESSMENT — PAIN SCALES - GENERAL
PAINLEVEL_OUTOF10: 4
PAINLEVEL_OUTOF10: 2
PAINLEVEL_OUTOF10: 7
PAINLEVEL_OUTOF10: 9
PAINLEVEL_OUTOF10: 4

## 2019-02-07 ASSESSMENT — PAIN DESCRIPTION - LOCATION
LOCATION: ANKLE;LEG
LOCATION: ANKLE
LOCATION: ANKLE;LEG
LOCATION: LEG

## 2019-02-07 ASSESSMENT — PAIN DESCRIPTION - ORIENTATION
ORIENTATION: LEFT
ORIENTATION: RIGHT
ORIENTATION: LEFT

## 2019-02-07 ASSESSMENT — PAIN DESCRIPTION - DIRECTION
RADIATING_TOWARDS: UPPER THIGH
RADIATING_TOWARDS: UPPER THIGH

## 2019-02-08 ENCOUNTER — APPOINTMENT (OUTPATIENT)
Dept: GENERAL RADIOLOGY | Age: 59
DRG: 919 | End: 2019-02-08
Payer: MEDICARE

## 2019-02-08 LAB
GLUCOSE BLD-MCNC: 259 MG/DL (ref 70–99)
GLUCOSE BLD-MCNC: 306 MG/DL (ref 70–99)
GLUCOSE BLD-MCNC: 314 MG/DL (ref 70–99)
GLUCOSE BLD-MCNC: 416 MG/DL (ref 70–99)
PERFORMED ON: ABNORMAL

## 2019-02-08 PROCEDURE — 97530 THERAPEUTIC ACTIVITIES: CPT

## 2019-02-08 PROCEDURE — 76937 US GUIDE VASCULAR ACCESS: CPT

## 2019-02-08 PROCEDURE — B548ZZA ULTRASONOGRAPHY OF SUPERIOR VENA CAVA, GUIDANCE: ICD-10-PCS | Performed by: INTERNAL MEDICINE

## 2019-02-08 PROCEDURE — 02HV33Z INSERTION OF INFUSION DEVICE INTO SUPERIOR VENA CAVA, PERCUTANEOUS APPROACH: ICD-10-PCS | Performed by: INTERNAL MEDICINE

## 2019-02-08 PROCEDURE — 2500000003 HC RX 250 WO HCPCS: Performed by: NURSE PRACTITIONER

## 2019-02-08 PROCEDURE — 99233 SBSQ HOSP IP/OBS HIGH 50: CPT | Performed by: INTERNAL MEDICINE

## 2019-02-08 PROCEDURE — 94760 N-INVAS EAR/PLS OXIMETRY 1: CPT

## 2019-02-08 PROCEDURE — 2580000003 HC RX 258: Performed by: NURSE PRACTITIONER

## 2019-02-08 PROCEDURE — 36569 INSJ PICC 5 YR+ W/O IMAGING: CPT

## 2019-02-08 PROCEDURE — 99232 SBSQ HOSP IP/OBS MODERATE 35: CPT | Performed by: SURGERY

## 2019-02-08 PROCEDURE — APPNB15 APP NON BILLABLE TIME 0-15 MINS: Performed by: NURSE PRACTITIONER

## 2019-02-08 PROCEDURE — C1751 CATH, INF, PER/CENT/MIDLINE: HCPCS

## 2019-02-08 PROCEDURE — 1200000000 HC SEMI PRIVATE

## 2019-02-08 PROCEDURE — 6370000000 HC RX 637 (ALT 250 FOR IP): Performed by: NURSE PRACTITIONER

## 2019-02-08 PROCEDURE — 6360000002 HC RX W HCPCS: Performed by: NURSE PRACTITIONER

## 2019-02-08 PROCEDURE — 97606 NEG PRS WND THER DME>50 SQCM: CPT

## 2019-02-08 PROCEDURE — 71045 X-RAY EXAM CHEST 1 VIEW: CPT

## 2019-02-08 RX ORDER — SODIUM CHLORIDE 0.9 % (FLUSH) 0.9 %
10 SYRINGE (ML) INJECTION EVERY 12 HOURS SCHEDULED
Status: DISCONTINUED | OUTPATIENT
Start: 2019-02-08 | End: 2019-02-15 | Stop reason: HOSPADM

## 2019-02-08 RX ORDER — LIDOCAINE HYDROCHLORIDE 10 MG/ML
5 INJECTION, SOLUTION EPIDURAL; INFILTRATION; INTRACAUDAL; PERINEURAL ONCE
Status: COMPLETED | OUTPATIENT
Start: 2019-02-08 | End: 2019-02-08

## 2019-02-08 RX ORDER — SODIUM CHLORIDE 0.9 % (FLUSH) 0.9 %
10 SYRINGE (ML) INJECTION PRN
Status: DISCONTINUED | OUTPATIENT
Start: 2019-02-08 | End: 2019-02-15 | Stop reason: HOSPADM

## 2019-02-08 RX ADMIN — LIDOCAINE HYDROCHLORIDE 5 ML: 10 INJECTION, SOLUTION EPIDURAL; INFILTRATION; INTRACAUDAL; PERINEURAL at 15:23

## 2019-02-08 RX ADMIN — DIGOXIN 125 MCG: 125 TABLET ORAL at 10:14

## 2019-02-08 RX ADMIN — MICONAZOLE NITRATE: 20 CREAM TOPICAL at 22:44

## 2019-02-08 RX ADMIN — PIPERACILLIN SODIUM,TAZOBACTAM SODIUM 3.38 G: 3; .375 INJECTION, POWDER, FOR SOLUTION INTRAVENOUS at 16:58

## 2019-02-08 RX ADMIN — INSULIN LISPRO 6 UNITS: 100 INJECTION, SOLUTION INTRAVENOUS; SUBCUTANEOUS at 16:59

## 2019-02-08 RX ADMIN — APIXABAN 5 MG: 5 TABLET, FILM COATED ORAL at 10:14

## 2019-02-08 RX ADMIN — OXYCODONE AND ACETAMINOPHEN 2 TABLET: 5; 325 TABLET ORAL at 10:14

## 2019-02-08 RX ADMIN — EMTRICITABINE AND TENOFOVIR DISOPROXIL FUMARATE 1 TABLET: 200; 300 TABLET, FILM COATED ORAL at 10:16

## 2019-02-08 RX ADMIN — INSULIN LISPRO 4 UNITS: 100 INJECTION, SOLUTION INTRAVENOUS; SUBCUTANEOUS at 17:01

## 2019-02-08 RX ADMIN — PIPERACILLIN SODIUM,TAZOBACTAM SODIUM 3.38 G: 3; .375 INJECTION, POWDER, FOR SOLUTION INTRAVENOUS at 10:31

## 2019-02-08 RX ADMIN — ATORVASTATIN CALCIUM 80 MG: 80 TABLET, FILM COATED ORAL at 22:46

## 2019-02-08 RX ADMIN — INSULIN LISPRO 12 UNITS: 100 INJECTION, SOLUTION INTRAVENOUS; SUBCUTANEOUS at 10:18

## 2019-02-08 RX ADMIN — ASPIRIN 81 MG 81 MG: 81 TABLET ORAL at 10:15

## 2019-02-08 RX ADMIN — METOPROLOL SUCCINATE 12.5 MG: 25 TABLET, EXTENDED RELEASE ORAL at 10:14

## 2019-02-08 RX ADMIN — INSULIN LISPRO 4 UNITS: 100 INJECTION, SOLUTION INTRAVENOUS; SUBCUTANEOUS at 22:44

## 2019-02-08 RX ADMIN — DOLUTEGRAVIR SODIUM 50 MG: 50 TABLET, FILM COATED ORAL at 10:16

## 2019-02-08 RX ADMIN — MONTELUKAST SODIUM 10 MG: 10 TABLET, COATED ORAL at 22:45

## 2019-02-08 RX ADMIN — INSULIN GLARGINE 12 UNITS: 100 INJECTION, SOLUTION SUBCUTANEOUS at 10:18

## 2019-02-08 RX ADMIN — Medication 10 ML: at 08:09

## 2019-02-08 RX ADMIN — PANTOPRAZOLE SODIUM 40 MG: 40 TABLET, DELAYED RELEASE ORAL at 06:04

## 2019-02-08 RX ADMIN — ARIPIPRAZOLE 2 MG: 2 TABLET ORAL at 22:44

## 2019-02-08 RX ADMIN — INSULIN LISPRO 4 UNITS: 100 INJECTION, SOLUTION INTRAVENOUS; SUBCUTANEOUS at 10:24

## 2019-02-08 RX ADMIN — FUROSEMIDE 40 MG: 40 TABLET ORAL at 10:14

## 2019-02-08 RX ADMIN — Medication 10 ML: at 21:00

## 2019-02-08 RX ADMIN — MIRTAZAPINE 45 MG: 30 TABLET, FILM COATED ORAL at 22:45

## 2019-02-08 RX ADMIN — FLUTICASONE PROPIONATE 1 SPRAY: 50 SPRAY, METERED NASAL at 10:18

## 2019-02-08 RX ADMIN — OXYCODONE AND ACETAMINOPHEN 2 TABLET: 5; 325 TABLET ORAL at 16:58

## 2019-02-08 RX ADMIN — PIPERACILLIN SODIUM,TAZOBACTAM SODIUM 3.38 G: 3; .375 INJECTION, POWDER, FOR SOLUTION INTRAVENOUS at 01:17

## 2019-02-08 RX ADMIN — APIXABAN 5 MG: 5 TABLET, FILM COATED ORAL at 22:45

## 2019-02-08 ASSESSMENT — PAIN DESCRIPTION - FREQUENCY
FREQUENCY: CONTINUOUS

## 2019-02-08 ASSESSMENT — PAIN DESCRIPTION - DESCRIPTORS
DESCRIPTORS: ACHING

## 2019-02-08 ASSESSMENT — PAIN SCALES - GENERAL
PAINLEVEL_OUTOF10: 10
PAINLEVEL_OUTOF10: 5
PAINLEVEL_OUTOF10: 9

## 2019-02-08 ASSESSMENT — PAIN DESCRIPTION - ORIENTATION
ORIENTATION: LEFT;MID;LOWER
ORIENTATION: LEFT
ORIENTATION: LEFT;MID;LOWER

## 2019-02-08 ASSESSMENT — PAIN DESCRIPTION - LOCATION
LOCATION: LEG
LOCATION: LEG
LOCATION: ANKLE;LEG

## 2019-02-08 ASSESSMENT — PAIN DESCRIPTION - PAIN TYPE
TYPE: ACUTE PAIN
TYPE: ACUTE PAIN
TYPE: SURGICAL PAIN

## 2019-02-08 ASSESSMENT — PAIN DESCRIPTION - ONSET
ONSET: ON-GOING

## 2019-02-08 ASSESSMENT — PAIN - FUNCTIONAL ASSESSMENT
PAIN_FUNCTIONAL_ASSESSMENT: ACTIVITIES ARE NOT PREVENTED
PAIN_FUNCTIONAL_ASSESSMENT: ACTIVITIES ARE NOT PREVENTED

## 2019-02-09 LAB
GLUCOSE BLD-MCNC: 108 MG/DL (ref 70–99)
GLUCOSE BLD-MCNC: 200 MG/DL (ref 70–99)
GLUCOSE BLD-MCNC: 251 MG/DL (ref 70–99)
GLUCOSE BLD-MCNC: 380 MG/DL (ref 70–99)
PERFORMED ON: ABNORMAL

## 2019-02-09 PROCEDURE — 97165 OT EVAL LOW COMPLEX 30 MIN: CPT

## 2019-02-09 PROCEDURE — 99024 POSTOP FOLLOW-UP VISIT: CPT | Performed by: SURGERY

## 2019-02-09 PROCEDURE — 97530 THERAPEUTIC ACTIVITIES: CPT

## 2019-02-09 PROCEDURE — 2580000003 HC RX 258: Performed by: NURSE PRACTITIONER

## 2019-02-09 PROCEDURE — 87205 SMEAR GRAM STAIN: CPT

## 2019-02-09 PROCEDURE — 94760 N-INVAS EAR/PLS OXIMETRY 1: CPT

## 2019-02-09 PROCEDURE — 87070 CULTURE OTHR SPECIMN AEROBIC: CPT

## 2019-02-09 PROCEDURE — 1200000000 HC SEMI PRIVATE

## 2019-02-09 PROCEDURE — 97535 SELF CARE MNGMENT TRAINING: CPT

## 2019-02-09 PROCEDURE — 6370000000 HC RX 637 (ALT 250 FOR IP): Performed by: NURSE PRACTITIONER

## 2019-02-09 PROCEDURE — 97162 PT EVAL MOD COMPLEX 30 MIN: CPT

## 2019-02-09 RX ADMIN — ARIPIPRAZOLE 2 MG: 2 TABLET ORAL at 20:55

## 2019-02-09 RX ADMIN — OXYCODONE AND ACETAMINOPHEN 2 TABLET: 5; 325 TABLET ORAL at 00:39

## 2019-02-09 RX ADMIN — DIGOXIN 125 MCG: 125 TABLET ORAL at 08:50

## 2019-02-09 RX ADMIN — APIXABAN 5 MG: 5 TABLET, FILM COATED ORAL at 21:00

## 2019-02-09 RX ADMIN — MIRTAZAPINE 45 MG: 30 TABLET, FILM COATED ORAL at 20:54

## 2019-02-09 RX ADMIN — OXYCODONE AND ACETAMINOPHEN 2 TABLET: 5; 325 TABLET ORAL at 14:04

## 2019-02-09 RX ADMIN — INSULIN LISPRO 4 UNITS: 100 INJECTION, SOLUTION INTRAVENOUS; SUBCUTANEOUS at 08:54

## 2019-02-09 RX ADMIN — Medication 10 ML: at 08:57

## 2019-02-09 RX ADMIN — EMTRICITABINE AND TENOFOVIR DISOPROXIL FUMARATE 1 TABLET: 200; 300 TABLET, FILM COATED ORAL at 08:50

## 2019-02-09 RX ADMIN — MICONAZOLE NITRATE: 20 CREAM TOPICAL at 08:57

## 2019-02-09 RX ADMIN — INSULIN LISPRO 10 UNITS: 100 INJECTION, SOLUTION INTRAVENOUS; SUBCUTANEOUS at 08:52

## 2019-02-09 RX ADMIN — OXYCODONE AND ACETAMINOPHEN 2 TABLET: 5; 325 TABLET ORAL at 20:50

## 2019-02-09 RX ADMIN — METOPROLOL SUCCINATE 12.5 MG: 25 TABLET, EXTENDED RELEASE ORAL at 08:50

## 2019-02-09 RX ADMIN — Medication 10 ML: at 21:00

## 2019-02-09 RX ADMIN — Medication 10 ML: at 19:45

## 2019-02-09 RX ADMIN — FUROSEMIDE 40 MG: 40 TABLET ORAL at 08:50

## 2019-02-09 RX ADMIN — ATORVASTATIN CALCIUM 80 MG: 80 TABLET, FILM COATED ORAL at 21:00

## 2019-02-09 RX ADMIN — INSULIN LISPRO 2 UNITS: 100 INJECTION, SOLUTION INTRAVENOUS; SUBCUTANEOUS at 20:54

## 2019-02-09 RX ADMIN — INSULIN LISPRO 6 UNITS: 100 INJECTION, SOLUTION INTRAVENOUS; SUBCUTANEOUS at 16:46

## 2019-02-09 RX ADMIN — INSULIN LISPRO 4 UNITS: 100 INJECTION, SOLUTION INTRAVENOUS; SUBCUTANEOUS at 16:46

## 2019-02-09 RX ADMIN — ASPIRIN 81 MG 81 MG: 81 TABLET ORAL at 08:50

## 2019-02-09 RX ADMIN — INSULIN GLARGINE 12 UNITS: 100 INJECTION, SOLUTION SUBCUTANEOUS at 08:53

## 2019-02-09 RX ADMIN — MICONAZOLE NITRATE: 20 CREAM TOPICAL at 19:45

## 2019-02-09 RX ADMIN — MONTELUKAST SODIUM 10 MG: 10 TABLET, COATED ORAL at 21:00

## 2019-02-09 RX ADMIN — OXYCODONE AND ACETAMINOPHEN 2 TABLET: 5; 325 TABLET ORAL at 06:19

## 2019-02-09 RX ADMIN — DOLUTEGRAVIR SODIUM 50 MG: 50 TABLET, FILM COATED ORAL at 08:50

## 2019-02-09 RX ADMIN — APIXABAN 5 MG: 5 TABLET, FILM COATED ORAL at 08:50

## 2019-02-09 RX ADMIN — PANTOPRAZOLE SODIUM 40 MG: 40 TABLET, DELAYED RELEASE ORAL at 06:24

## 2019-02-09 ASSESSMENT — PAIN SCALES - GENERAL
PAINLEVEL_OUTOF10: 0
PAINLEVEL_OUTOF10: 7
PAINLEVEL_OUTOF10: 8
PAINLEVEL_OUTOF10: 7
PAINLEVEL_OUTOF10: 5
PAINLEVEL_OUTOF10: 9
PAINLEVEL_OUTOF10: 10

## 2019-02-09 ASSESSMENT — PAIN DESCRIPTION - PROGRESSION
CLINICAL_PROGRESSION: GRADUALLY IMPROVING
CLINICAL_PROGRESSION: NOT CHANGED
CLINICAL_PROGRESSION: GRADUALLY IMPROVING
CLINICAL_PROGRESSION: NOT CHANGED
CLINICAL_PROGRESSION: GRADUALLY IMPROVING
CLINICAL_PROGRESSION: NOT CHANGED
CLINICAL_PROGRESSION: NOT CHANGED
CLINICAL_PROGRESSION: GRADUALLY IMPROVING

## 2019-02-09 ASSESSMENT — PAIN DESCRIPTION - LOCATION
LOCATION: LEG

## 2019-02-09 ASSESSMENT — PAIN DESCRIPTION - DESCRIPTORS
DESCRIPTORS: ACHING

## 2019-02-09 ASSESSMENT — PAIN DESCRIPTION - ONSET
ONSET: ON-GOING

## 2019-02-09 ASSESSMENT — PAIN DESCRIPTION - ORIENTATION
ORIENTATION: LEFT

## 2019-02-09 ASSESSMENT — PAIN DESCRIPTION - DIRECTION: RADIATING_TOWARDS: UPPER THIGH

## 2019-02-09 ASSESSMENT — PAIN DESCRIPTION - FREQUENCY
FREQUENCY: CONTINUOUS

## 2019-02-09 ASSESSMENT — PAIN DESCRIPTION - PAIN TYPE
TYPE: ACUTE PAIN
TYPE: CHRONIC PAIN
TYPE: ACUTE PAIN
TYPE: ACUTE PAIN

## 2019-02-10 LAB
BLOOD CULTURE, ROUTINE: NORMAL
BLOOD CULTURE, ROUTINE: NORMAL
GLUCOSE BLD-MCNC: 143 MG/DL (ref 70–99)
GLUCOSE BLD-MCNC: 229 MG/DL (ref 70–99)
GLUCOSE BLD-MCNC: 233 MG/DL (ref 70–99)
GLUCOSE BLD-MCNC: 299 MG/DL (ref 70–99)
PERFORMED ON: ABNORMAL

## 2019-02-10 PROCEDURE — 99024 POSTOP FOLLOW-UP VISIT: CPT | Performed by: SURGERY

## 2019-02-10 PROCEDURE — 1200000000 HC SEMI PRIVATE

## 2019-02-10 PROCEDURE — 6370000000 HC RX 637 (ALT 250 FOR IP): Performed by: NURSE PRACTITIONER

## 2019-02-10 PROCEDURE — 99232 SBSQ HOSP IP/OBS MODERATE 35: CPT | Performed by: INTERNAL MEDICINE

## 2019-02-10 PROCEDURE — 6360000002 HC RX W HCPCS: Performed by: NURSE PRACTITIONER

## 2019-02-10 PROCEDURE — 94760 N-INVAS EAR/PLS OXIMETRY 1: CPT

## 2019-02-10 PROCEDURE — 2580000003 HC RX 258: Performed by: NURSE PRACTITIONER

## 2019-02-10 RX ORDER — CALCIUM CARBONATE 200(500)MG
500 TABLET,CHEWABLE ORAL 3 TIMES DAILY PRN
Status: DISCONTINUED | OUTPATIENT
Start: 2019-02-10 | End: 2019-02-15 | Stop reason: HOSPADM

## 2019-02-10 RX ADMIN — OXYCODONE AND ACETAMINOPHEN 2 TABLET: 5; 325 TABLET ORAL at 21:54

## 2019-02-10 RX ADMIN — APIXABAN 5 MG: 5 TABLET, FILM COATED ORAL at 21:53

## 2019-02-10 RX ADMIN — SIMETHICONE CHEW TAB 80 MG 120 MG: 80 TABLET ORAL at 12:43

## 2019-02-10 RX ADMIN — OXYCODONE AND ACETAMINOPHEN 2 TABLET: 5; 325 TABLET ORAL at 05:38

## 2019-02-10 RX ADMIN — ATORVASTATIN CALCIUM 80 MG: 80 TABLET, FILM COATED ORAL at 21:54

## 2019-02-10 RX ADMIN — Medication 10 ML: at 09:23

## 2019-02-10 RX ADMIN — FLUTICASONE PROPIONATE 1 SPRAY: 50 SPRAY, METERED NASAL at 08:43

## 2019-02-10 RX ADMIN — EMTRICITABINE AND TENOFOVIR DISOPROXIL FUMARATE 1 TABLET: 200; 300 TABLET, FILM COATED ORAL at 08:35

## 2019-02-10 RX ADMIN — DOLUTEGRAVIR SODIUM 50 MG: 50 TABLET, FILM COATED ORAL at 08:35

## 2019-02-10 RX ADMIN — ASPIRIN 81 MG 81 MG: 81 TABLET ORAL at 08:35

## 2019-02-10 RX ADMIN — ONDANSETRON 4 MG: 2 INJECTION INTRAMUSCULAR; INTRAVENOUS at 12:49

## 2019-02-10 RX ADMIN — PANTOPRAZOLE SODIUM 40 MG: 40 TABLET, DELAYED RELEASE ORAL at 05:38

## 2019-02-10 RX ADMIN — INSULIN LISPRO 4 UNITS: 100 INJECTION, SOLUTION INTRAVENOUS; SUBCUTANEOUS at 16:16

## 2019-02-10 RX ADMIN — APIXABAN 5 MG: 5 TABLET, FILM COATED ORAL at 08:31

## 2019-02-10 RX ADMIN — OXYCODONE AND ACETAMINOPHEN 2 TABLET: 5; 325 TABLET ORAL at 12:47

## 2019-02-10 RX ADMIN — MICONAZOLE NITRATE 1 APPLICATOR: 20 CREAM TOPICAL at 21:55

## 2019-02-10 RX ADMIN — MONTELUKAST SODIUM 10 MG: 10 TABLET, COATED ORAL at 21:54

## 2019-02-10 RX ADMIN — FUROSEMIDE 40 MG: 40 TABLET ORAL at 08:32

## 2019-02-10 RX ADMIN — MIRTAZAPINE 45 MG: 30 TABLET, FILM COATED ORAL at 21:53

## 2019-02-10 RX ADMIN — METOPROLOL SUCCINATE 12.5 MG: 25 TABLET, EXTENDED RELEASE ORAL at 08:32

## 2019-02-10 RX ADMIN — INSULIN LISPRO 2 UNITS: 100 INJECTION, SOLUTION INTRAVENOUS; SUBCUTANEOUS at 16:15

## 2019-02-10 RX ADMIN — INSULIN LISPRO 6 UNITS: 100 INJECTION, SOLUTION INTRAVENOUS; SUBCUTANEOUS at 08:36

## 2019-02-10 RX ADMIN — ANTACID TABLETS 500 MG: 500 TABLET, CHEWABLE ORAL at 13:42

## 2019-02-10 RX ADMIN — INSULIN LISPRO 4 UNITS: 100 INJECTION, SOLUTION INTRAVENOUS; SUBCUTANEOUS at 12:54

## 2019-02-10 RX ADMIN — INSULIN GLARGINE 12 UNITS: 100 INJECTION, SOLUTION SUBCUTANEOUS at 08:35

## 2019-02-10 RX ADMIN — INSULIN LISPRO 2 UNITS: 100 INJECTION, SOLUTION INTRAVENOUS; SUBCUTANEOUS at 21:59

## 2019-02-10 RX ADMIN — Medication 10 ML: at 21:55

## 2019-02-10 RX ADMIN — ARIPIPRAZOLE 2 MG: 2 TABLET ORAL at 21:59

## 2019-02-10 RX ADMIN — INSULIN LISPRO 4 UNITS: 100 INJECTION, SOLUTION INTRAVENOUS; SUBCUTANEOUS at 12:56

## 2019-02-10 RX ADMIN — DIGOXIN 125 MCG: 125 TABLET ORAL at 08:31

## 2019-02-10 RX ADMIN — Medication 10 ML: at 09:22

## 2019-02-10 RX ADMIN — MICONAZOLE NITRATE: 20 CREAM TOPICAL at 08:43

## 2019-02-10 RX ADMIN — INSULIN LISPRO 4 UNITS: 100 INJECTION, SOLUTION INTRAVENOUS; SUBCUTANEOUS at 08:37

## 2019-02-10 ASSESSMENT — PAIN DESCRIPTION - FREQUENCY
FREQUENCY: CONTINUOUS

## 2019-02-10 ASSESSMENT — PAIN DESCRIPTION - PROGRESSION
CLINICAL_PROGRESSION: NOT CHANGED

## 2019-02-10 ASSESSMENT — PAIN SCALES - GENERAL
PAINLEVEL_OUTOF10: 7
PAINLEVEL_OUTOF10: 8
PAINLEVEL_OUTOF10: 6
PAINLEVEL_OUTOF10: 6
PAINLEVEL_OUTOF10: 8

## 2019-02-10 ASSESSMENT — PAIN DESCRIPTION - ORIENTATION
ORIENTATION: LEFT

## 2019-02-10 ASSESSMENT — PAIN DESCRIPTION - ONSET
ONSET: ON-GOING

## 2019-02-10 ASSESSMENT — PAIN DESCRIPTION - LOCATION
LOCATION: LEG

## 2019-02-10 ASSESSMENT — PAIN DESCRIPTION - DESCRIPTORS
DESCRIPTORS: ACHING
DESCRIPTORS: ACHING

## 2019-02-10 ASSESSMENT — PAIN DESCRIPTION - PAIN TYPE
TYPE: CHRONIC PAIN

## 2019-02-10 ASSESSMENT — PAIN DESCRIPTION - DIRECTION: RADIATING_TOWARDS: UPPER THIGH

## 2019-02-11 ENCOUNTER — APPOINTMENT (OUTPATIENT)
Dept: INTERVENTIONAL RADIOLOGY/VASCULAR | Age: 59
DRG: 919 | End: 2019-02-11
Payer: MEDICARE

## 2019-02-11 LAB
ANION GAP SERPL CALCULATED.3IONS-SCNC: 11 MMOL/L (ref 3–16)
BASOPHILS ABSOLUTE: 0.1 K/UL (ref 0–0.2)
BASOPHILS RELATIVE PERCENT: 1.4 %
BUN BLDV-MCNC: 31 MG/DL (ref 7–20)
CALCIUM SERPL-MCNC: 8.7 MG/DL (ref 8.3–10.6)
CHLORIDE BLD-SCNC: 96 MMOL/L (ref 99–110)
CO2: 27 MMOL/L (ref 21–32)
CREAT SERPL-MCNC: 1.1 MG/DL (ref 0.9–1.3)
EOSINOPHILS ABSOLUTE: 0.1 K/UL (ref 0–0.6)
EOSINOPHILS RELATIVE PERCENT: 1.8 %
GFR AFRICAN AMERICAN: >60
GFR NON-AFRICAN AMERICAN: >60
GLUCOSE BLD-MCNC: 133 MG/DL (ref 70–99)
GLUCOSE BLD-MCNC: 145 MG/DL (ref 70–99)
GLUCOSE BLD-MCNC: 165 MG/DL (ref 70–99)
GLUCOSE BLD-MCNC: 282 MG/DL (ref 70–99)
GLUCOSE BLD-MCNC: 292 MG/DL (ref 70–99)
GLUCOSE BLD-MCNC: 301 MG/DL (ref 70–99)
GRAM STAIN RESULT: ABNORMAL
HCT VFR BLD CALC: 33.5 % (ref 40.5–52.5)
HEMOGLOBIN: 10.5 G/DL (ref 13.5–17.5)
LYMPHOCYTES ABSOLUTE: 1.8 K/UL (ref 1–5.1)
LYMPHOCYTES RELATIVE PERCENT: 26.1 %
MCH RBC QN AUTO: 25.6 PG (ref 26–34)
MCHC RBC AUTO-ENTMCNC: 31.3 G/DL (ref 31–36)
MCV RBC AUTO: 81.8 FL (ref 80–100)
MONOCYTES ABSOLUTE: 0.6 K/UL (ref 0–1.3)
MONOCYTES RELATIVE PERCENT: 7.9 %
NEUTROPHILS ABSOLUTE: 4.5 K/UL (ref 1.7–7.7)
NEUTROPHILS RELATIVE PERCENT: 62.8 %
ORGANISM: ABNORMAL
PDW BLD-RTO: 20.5 % (ref 12.4–15.4)
PERFORMED ON: ABNORMAL
PLATELET # BLD: 184 K/UL (ref 135–450)
PMV BLD AUTO: 7.5 FL (ref 5–10.5)
POTASSIUM SERPL-SCNC: 5 MMOL/L (ref 3.5–5.1)
RBC # BLD: 4.1 M/UL (ref 4.2–5.9)
SODIUM BLD-SCNC: 134 MMOL/L (ref 136–145)
WBC # BLD: 7.1 K/UL (ref 4–11)
WOUND/ABSCESS: ABNORMAL
WOUND/ABSCESS: ABNORMAL

## 2019-02-11 PROCEDURE — 99232 SBSQ HOSP IP/OBS MODERATE 35: CPT | Performed by: SURGERY

## 2019-02-11 PROCEDURE — 6370000000 HC RX 637 (ALT 250 FOR IP): Performed by: NURSE PRACTITIONER

## 2019-02-11 PROCEDURE — 76080 X-RAY EXAM OF FISTULA: CPT

## 2019-02-11 PROCEDURE — 6360000004 HC RX CONTRAST MEDICATION: Performed by: SURGERY

## 2019-02-11 PROCEDURE — 49424 ASSESS CYST CONTRAST INJECT: CPT

## 2019-02-11 PROCEDURE — C1769 GUIDE WIRE: HCPCS

## 2019-02-11 PROCEDURE — APPNB30 APP NON BILLABLE TIME 0-30 MINS: Performed by: NURSE PRACTITIONER

## 2019-02-11 PROCEDURE — 2580000003 HC RX 258: Performed by: NURSE PRACTITIONER

## 2019-02-11 PROCEDURE — 85025 COMPLETE CBC W/AUTO DIFF WBC: CPT

## 2019-02-11 PROCEDURE — 80048 BASIC METABOLIC PNL TOTAL CA: CPT

## 2019-02-11 PROCEDURE — 97606 NEG PRS WND THER DME>50 SQCM: CPT

## 2019-02-11 PROCEDURE — 94760 N-INVAS EAR/PLS OXIMETRY 1: CPT

## 2019-02-11 PROCEDURE — 97530 THERAPEUTIC ACTIVITIES: CPT

## 2019-02-11 PROCEDURE — 97535 SELF CARE MNGMENT TRAINING: CPT

## 2019-02-11 PROCEDURE — 1200000000 HC SEMI PRIVATE

## 2019-02-11 PROCEDURE — APPSS30 APP SPLIT SHARED TIME 16-30 MINUTES: Performed by: NURSE PRACTITIONER

## 2019-02-11 RX ORDER — INSULIN GLARGINE 100 [IU]/ML
15 INJECTION, SOLUTION SUBCUTANEOUS DAILY
Status: DISCONTINUED | OUTPATIENT
Start: 2019-02-12 | End: 2019-02-13

## 2019-02-11 RX ORDER — OXYCODONE HYDROCHLORIDE AND ACETAMINOPHEN 5; 325 MG/1; MG/1
2 TABLET ORAL EVERY 4 HOURS PRN
Status: DISCONTINUED | OUTPATIENT
Start: 2019-02-11 | End: 2019-02-15 | Stop reason: HOSPADM

## 2019-02-11 RX ORDER — OXYCODONE HYDROCHLORIDE AND ACETAMINOPHEN 5; 325 MG/1; MG/1
1 TABLET ORAL EVERY 6 HOURS PRN
Status: DISCONTINUED | OUTPATIENT
Start: 2019-02-11 | End: 2019-02-15 | Stop reason: HOSPADM

## 2019-02-11 RX ADMIN — MICONAZOLE NITRATE: 20 CREAM TOPICAL at 09:56

## 2019-02-11 RX ADMIN — PANTOPRAZOLE SODIUM 40 MG: 40 TABLET, DELAYED RELEASE ORAL at 06:15

## 2019-02-11 RX ADMIN — INSULIN LISPRO 8 UNITS: 100 INJECTION, SOLUTION INTRAVENOUS; SUBCUTANEOUS at 09:23

## 2019-02-11 RX ADMIN — APIXABAN 5 MG: 5 TABLET, FILM COATED ORAL at 09:27

## 2019-02-11 RX ADMIN — INSULIN GLARGINE 12 UNITS: 100 INJECTION, SOLUTION SUBCUTANEOUS at 09:24

## 2019-02-11 RX ADMIN — Medication 10 ML: at 09:43

## 2019-02-11 RX ADMIN — DIGOXIN 125 MCG: 125 TABLET ORAL at 09:28

## 2019-02-11 RX ADMIN — FLUTICASONE PROPIONATE 1 SPRAY: 50 SPRAY, METERED NASAL at 09:55

## 2019-02-11 RX ADMIN — MICONAZOLE NITRATE: 20 CREAM TOPICAL at 21:00

## 2019-02-11 RX ADMIN — Medication 10 ML: at 21:00

## 2019-02-11 RX ADMIN — EMTRICITABINE AND TENOFOVIR DISOPROXIL FUMARATE 1 TABLET: 200; 300 TABLET, FILM COATED ORAL at 09:27

## 2019-02-11 RX ADMIN — ASPIRIN 81 MG 81 MG: 81 TABLET ORAL at 09:27

## 2019-02-11 RX ADMIN — ATORVASTATIN CALCIUM 80 MG: 80 TABLET, FILM COATED ORAL at 20:56

## 2019-02-11 RX ADMIN — Medication 10 ML: at 20:56

## 2019-02-11 RX ADMIN — MIRTAZAPINE 45 MG: 30 TABLET, FILM COATED ORAL at 20:56

## 2019-02-11 RX ADMIN — METOPROLOL SUCCINATE 12.5 MG: 25 TABLET, EXTENDED RELEASE ORAL at 09:27

## 2019-02-11 RX ADMIN — OXYCODONE AND ACETAMINOPHEN 2 TABLET: 5; 325 TABLET ORAL at 06:15

## 2019-02-11 RX ADMIN — INSULIN LISPRO 2 UNITS: 100 INJECTION, SOLUTION INTRAVENOUS; SUBCUTANEOUS at 16:53

## 2019-02-11 RX ADMIN — ZOLPIDEM TARTRATE 10 MG: 5 TABLET ORAL at 01:48

## 2019-02-11 RX ADMIN — INSULIN LISPRO 10 UNITS: 100 INJECTION, SOLUTION INTRAVENOUS; SUBCUTANEOUS at 16:55

## 2019-02-11 RX ADMIN — FUROSEMIDE 40 MG: 40 TABLET ORAL at 09:29

## 2019-02-11 RX ADMIN — ARIPIPRAZOLE 2 MG: 2 TABLET ORAL at 20:56

## 2019-02-11 RX ADMIN — DOLUTEGRAVIR SODIUM 50 MG: 50 TABLET, FILM COATED ORAL at 09:27

## 2019-02-11 RX ADMIN — APIXABAN 5 MG: 5 TABLET, FILM COATED ORAL at 20:56

## 2019-02-11 RX ADMIN — INSULIN LISPRO 2 UNITS: 100 INJECTION, SOLUTION INTRAVENOUS; SUBCUTANEOUS at 12:35

## 2019-02-11 RX ADMIN — MONTELUKAST SODIUM 10 MG: 10 TABLET, COATED ORAL at 20:56

## 2019-02-11 RX ADMIN — IOVERSOL 30 ML: 678 INJECTION INTRA-ARTERIAL; INTRAVENOUS at 13:47

## 2019-02-11 ASSESSMENT — PAIN SCALES - GENERAL
PAINLEVEL_OUTOF10: 6
PAINLEVEL_OUTOF10: 5
PAINLEVEL_OUTOF10: 7
PAINLEVEL_OUTOF10: 0

## 2019-02-11 ASSESSMENT — PAIN DESCRIPTION - PROGRESSION
CLINICAL_PROGRESSION: NOT CHANGED

## 2019-02-12 LAB
ANAEROBIC CULTURE: NORMAL
ANAEROBIC CULTURE: NORMAL
ANION GAP SERPL CALCULATED.3IONS-SCNC: 10 MMOL/L (ref 3–16)
BASOPHILS ABSOLUTE: 0.1 K/UL (ref 0–0.2)
BASOPHILS RELATIVE PERCENT: 1.5 %
BUN BLDV-MCNC: 29 MG/DL (ref 7–20)
CALCIUM SERPL-MCNC: 9 MG/DL (ref 8.3–10.6)
CHLORIDE BLD-SCNC: 103 MMOL/L (ref 99–110)
CO2: 28 MMOL/L (ref 21–32)
CREAT SERPL-MCNC: 1 MG/DL (ref 0.9–1.3)
EOSINOPHILS ABSOLUTE: 0.1 K/UL (ref 0–0.6)
EOSINOPHILS RELATIVE PERCENT: 1.5 %
GFR AFRICAN AMERICAN: >60
GFR NON-AFRICAN AMERICAN: >60
GLUCOSE BLD-MCNC: 151 MG/DL (ref 70–99)
GLUCOSE BLD-MCNC: 181 MG/DL (ref 70–99)
GLUCOSE BLD-MCNC: 186 MG/DL (ref 70–99)
GLUCOSE BLD-MCNC: 247 MG/DL (ref 70–99)
GLUCOSE BLD-MCNC: 260 MG/DL (ref 70–99)
GRAM STAIN RESULT: NORMAL
GRAM STAIN RESULT: NORMAL
HCT VFR BLD CALC: 32.9 % (ref 40.5–52.5)
HEMOGLOBIN: 10.3 G/DL (ref 13.5–17.5)
LYMPHOCYTES ABSOLUTE: 1.6 K/UL (ref 1–5.1)
LYMPHOCYTES RELATIVE PERCENT: 23.7 %
MCH RBC QN AUTO: 25.6 PG (ref 26–34)
MCHC RBC AUTO-ENTMCNC: 31.3 G/DL (ref 31–36)
MCV RBC AUTO: 81.8 FL (ref 80–100)
MONOCYTES ABSOLUTE: 0.5 K/UL (ref 0–1.3)
MONOCYTES RELATIVE PERCENT: 8.2 %
NEUTROPHILS ABSOLUTE: 4.3 K/UL (ref 1.7–7.7)
NEUTROPHILS RELATIVE PERCENT: 65.1 %
PDW BLD-RTO: 20.7 % (ref 12.4–15.4)
PERFORMED ON: ABNORMAL
PLATELET # BLD: 171 K/UL (ref 135–450)
PMV BLD AUTO: 7.8 FL (ref 5–10.5)
POTASSIUM REFLEX MAGNESIUM: 4.8 MMOL/L (ref 3.5–5.1)
RBC # BLD: 4.02 M/UL (ref 4.2–5.9)
SODIUM BLD-SCNC: 141 MMOL/L (ref 136–145)
WBC # BLD: 6.5 K/UL (ref 4–11)
WOUND/ABSCESS: NORMAL
WOUND/ABSCESS: NORMAL

## 2019-02-12 PROCEDURE — 87077 CULTURE AEROBIC IDENTIFY: CPT

## 2019-02-12 PROCEDURE — 87186 SC STD MICRODIL/AGAR DIL: CPT

## 2019-02-12 PROCEDURE — APPSS30 APP SPLIT SHARED TIME 16-30 MINUTES: Performed by: NURSE PRACTITIONER

## 2019-02-12 PROCEDURE — 85025 COMPLETE CBC W/AUTO DIFF WBC: CPT

## 2019-02-12 PROCEDURE — 99232 SBSQ HOSP IP/OBS MODERATE 35: CPT | Performed by: SURGERY

## 2019-02-12 PROCEDURE — 87070 CULTURE OTHR SPECIMN AEROBIC: CPT

## 2019-02-12 PROCEDURE — 87205 SMEAR GRAM STAIN: CPT

## 2019-02-12 PROCEDURE — APPNB30 APP NON BILLABLE TIME 0-30 MINS: Performed by: NURSE PRACTITIONER

## 2019-02-12 PROCEDURE — 6370000000 HC RX 637 (ALT 250 FOR IP): Performed by: NURSE PRACTITIONER

## 2019-02-12 PROCEDURE — 2580000003 HC RX 258: Performed by: NURSE PRACTITIONER

## 2019-02-12 PROCEDURE — 94760 N-INVAS EAR/PLS OXIMETRY 1: CPT

## 2019-02-12 PROCEDURE — 1200000000 HC SEMI PRIVATE

## 2019-02-12 PROCEDURE — 80048 BASIC METABOLIC PNL TOTAL CA: CPT

## 2019-02-12 PROCEDURE — 97535 SELF CARE MNGMENT TRAINING: CPT

## 2019-02-12 RX ORDER — AMMONIUM LACTATE 12 G/100G
LOTION TOPICAL PRN
Status: DISCONTINUED | OUTPATIENT
Start: 2019-02-12 | End: 2019-02-15 | Stop reason: HOSPADM

## 2019-02-12 RX ADMIN — INSULIN LISPRO 6 UNITS: 100 INJECTION, SOLUTION INTRAVENOUS; SUBCUTANEOUS at 13:57

## 2019-02-12 RX ADMIN — MICONAZOLE NITRATE: 20 CREAM TOPICAL at 20:20

## 2019-02-12 RX ADMIN — MICONAZOLE NITRATE: 20 CREAM TOPICAL at 09:50

## 2019-02-12 RX ADMIN — Medication 10 ML: at 20:20

## 2019-02-12 RX ADMIN — ATORVASTATIN CALCIUM 80 MG: 80 TABLET, FILM COATED ORAL at 20:18

## 2019-02-12 RX ADMIN — ARIPIPRAZOLE 2 MG: 2 TABLET ORAL at 20:18

## 2019-02-12 RX ADMIN — METOPROLOL SUCCINATE 12.5 MG: 25 TABLET, EXTENDED RELEASE ORAL at 09:50

## 2019-02-12 RX ADMIN — Medication 10 ML: at 09:51

## 2019-02-12 RX ADMIN — DIGOXIN 125 MCG: 125 TABLET ORAL at 09:50

## 2019-02-12 RX ADMIN — INSULIN LISPRO 10 UNITS: 100 INJECTION, SOLUTION INTRAVENOUS; SUBCUTANEOUS at 10:01

## 2019-02-12 RX ADMIN — Medication 10 ML: at 20:24

## 2019-02-12 RX ADMIN — INSULIN GLARGINE 15 UNITS: 100 INJECTION, SOLUTION SUBCUTANEOUS at 09:55

## 2019-02-12 RX ADMIN — INSULIN LISPRO 2 UNITS: 100 INJECTION, SOLUTION INTRAVENOUS; SUBCUTANEOUS at 16:02

## 2019-02-12 RX ADMIN — EMTRICITABINE AND TENOFOVIR DISOPROXIL FUMARATE 1 TABLET: 200; 300 TABLET, FILM COATED ORAL at 09:50

## 2019-02-12 RX ADMIN — Medication: at 20:23

## 2019-02-12 RX ADMIN — APIXABAN 5 MG: 5 TABLET, FILM COATED ORAL at 09:50

## 2019-02-12 RX ADMIN — PANTOPRAZOLE SODIUM 40 MG: 40 TABLET, DELAYED RELEASE ORAL at 05:01

## 2019-02-12 RX ADMIN — ASPIRIN 81 MG 81 MG: 81 TABLET ORAL at 09:50

## 2019-02-12 RX ADMIN — OXYCODONE AND ACETAMINOPHEN 2 TABLET: 5; 325 TABLET ORAL at 01:50

## 2019-02-12 RX ADMIN — INSULIN LISPRO 10 UNITS: 100 INJECTION, SOLUTION INTRAVENOUS; SUBCUTANEOUS at 16:03

## 2019-02-12 RX ADMIN — DOLUTEGRAVIR SODIUM 50 MG: 50 TABLET, FILM COATED ORAL at 09:50

## 2019-02-12 RX ADMIN — MONTELUKAST SODIUM 10 MG: 10 TABLET, COATED ORAL at 20:18

## 2019-02-12 RX ADMIN — APIXABAN 5 MG: 5 TABLET, FILM COATED ORAL at 20:18

## 2019-02-12 RX ADMIN — FUROSEMIDE 40 MG: 40 TABLET ORAL at 09:50

## 2019-02-12 RX ADMIN — MIRTAZAPINE 45 MG: 30 TABLET, FILM COATED ORAL at 20:18

## 2019-02-12 RX ADMIN — INSULIN LISPRO 2 UNITS: 100 INJECTION, SOLUTION INTRAVENOUS; SUBCUTANEOUS at 09:55

## 2019-02-12 RX ADMIN — FLUTICASONE PROPIONATE 1 SPRAY: 50 SPRAY, METERED NASAL at 09:51

## 2019-02-12 ASSESSMENT — PAIN DESCRIPTION - FREQUENCY: FREQUENCY: CONTINUOUS

## 2019-02-12 ASSESSMENT — PAIN DESCRIPTION - DESCRIPTORS: DESCRIPTORS: ACHING

## 2019-02-12 ASSESSMENT — PAIN DESCRIPTION - ORIENTATION: ORIENTATION: LEFT

## 2019-02-12 ASSESSMENT — PAIN DESCRIPTION - PROGRESSION: CLINICAL_PROGRESSION: NOT CHANGED

## 2019-02-12 ASSESSMENT — PAIN - FUNCTIONAL ASSESSMENT: PAIN_FUNCTIONAL_ASSESSMENT: ACTIVITIES ARE NOT PREVENTED

## 2019-02-12 ASSESSMENT — PAIN DESCRIPTION - PAIN TYPE: TYPE: CHRONIC PAIN

## 2019-02-12 ASSESSMENT — PAIN SCALES - GENERAL: PAINLEVEL_OUTOF10: 8

## 2019-02-12 ASSESSMENT — PAIN DESCRIPTION - DIRECTION: RADIATING_TOWARDS: UPPER THIGH

## 2019-02-12 ASSESSMENT — PAIN DESCRIPTION - ONSET: ONSET: ON-GOING

## 2019-02-12 ASSESSMENT — PAIN DESCRIPTION - LOCATION: LOCATION: LEG

## 2019-02-13 ENCOUNTER — APPOINTMENT (OUTPATIENT)
Dept: INTERVENTIONAL RADIOLOGY/VASCULAR | Age: 59
DRG: 919 | End: 2019-02-13
Payer: MEDICARE

## 2019-02-13 LAB
ANION GAP SERPL CALCULATED.3IONS-SCNC: 11 MMOL/L (ref 3–16)
BASOPHILS ABSOLUTE: 0.1 K/UL (ref 0–0.2)
BASOPHILS RELATIVE PERCENT: 1 %
BUN BLDV-MCNC: 32 MG/DL (ref 7–20)
CALCIUM SERPL-MCNC: 8.5 MG/DL (ref 8.3–10.6)
CHLORIDE BLD-SCNC: 100 MMOL/L (ref 99–110)
CO2: 26 MMOL/L (ref 21–32)
CREAT SERPL-MCNC: 1 MG/DL (ref 0.9–1.3)
EOSINOPHILS ABSOLUTE: 0.1 K/UL (ref 0–0.6)
EOSINOPHILS RELATIVE PERCENT: 1.8 %
GFR AFRICAN AMERICAN: >60
GFR NON-AFRICAN AMERICAN: >60
GLUCOSE BLD-MCNC: 112 MG/DL (ref 70–99)
GLUCOSE BLD-MCNC: 134 MG/DL (ref 70–99)
GLUCOSE BLD-MCNC: 163 MG/DL (ref 70–99)
GLUCOSE BLD-MCNC: 341 MG/DL (ref 70–99)
GLUCOSE BLD-MCNC: 380 MG/DL (ref 70–99)
HCT VFR BLD CALC: 31.2 % (ref 40.5–52.5)
HEMOGLOBIN: 9.7 G/DL (ref 13.5–17.5)
LYMPHOCYTES ABSOLUTE: 1.1 K/UL (ref 1–5.1)
LYMPHOCYTES RELATIVE PERCENT: 18.3 %
MCH RBC QN AUTO: 25.8 PG (ref 26–34)
MCHC RBC AUTO-ENTMCNC: 31 G/DL (ref 31–36)
MCV RBC AUTO: 83.3 FL (ref 80–100)
MONOCYTES ABSOLUTE: 0.6 K/UL (ref 0–1.3)
MONOCYTES RELATIVE PERCENT: 9.1 %
NEUTROPHILS ABSOLUTE: 4.2 K/UL (ref 1.7–7.7)
NEUTROPHILS RELATIVE PERCENT: 69.8 %
PDW BLD-RTO: 21 % (ref 12.4–15.4)
PERFORMED ON: ABNORMAL
PLATELET # BLD: 217 K/UL (ref 135–450)
PMV BLD AUTO: 7.5 FL (ref 5–10.5)
POTASSIUM REFLEX MAGNESIUM: 5 MMOL/L (ref 3.5–5.1)
RBC # BLD: 3.74 M/UL (ref 4.2–5.9)
SODIUM BLD-SCNC: 137 MMOL/L (ref 136–145)
WBC # BLD: 6.1 K/UL (ref 4–11)

## 2019-02-13 PROCEDURE — 3E013TZ INTRODUCTION OF DESTRUCTIVE AGENT INTO SUBCUTANEOUS TISSUE, PERCUTANEOUS APPROACH: ICD-10-PCS | Performed by: RADIOLOGY

## 2019-02-13 PROCEDURE — 6370000000 HC RX 637 (ALT 250 FOR IP): Performed by: NURSE PRACTITIONER

## 2019-02-13 PROCEDURE — 80048 BASIC METABOLIC PNL TOTAL CA: CPT

## 2019-02-13 PROCEDURE — 94760 N-INVAS EAR/PLS OXIMETRY 1: CPT

## 2019-02-13 PROCEDURE — 1200000000 HC SEMI PRIVATE

## 2019-02-13 PROCEDURE — 2709999900 IR GUIDED SCLEROTX FLUID COLLECTION W OR WO CONTRAST

## 2019-02-13 PROCEDURE — APPSS30 APP SPLIT SHARED TIME 16-30 MINUTES: Performed by: NURSE PRACTITIONER

## 2019-02-13 PROCEDURE — APPNB30 APP NON BILLABLE TIME 0-30 MINS: Performed by: NURSE PRACTITIONER

## 2019-02-13 PROCEDURE — 6370000000 HC RX 637 (ALT 250 FOR IP): Performed by: RADIOLOGY

## 2019-02-13 PROCEDURE — 85025 COMPLETE CBC W/AUTO DIFF WBC: CPT

## 2019-02-13 PROCEDURE — 2580000003 HC RX 258: Performed by: NURSE PRACTITIONER

## 2019-02-13 PROCEDURE — 6360000002 HC RX W HCPCS: Performed by: NURSE PRACTITIONER

## 2019-02-13 PROCEDURE — 49185 SCLEROTX FLUID COLLECTION: CPT

## 2019-02-13 PROCEDURE — 6360000004 HC RX CONTRAST MEDICATION: Performed by: SURGERY

## 2019-02-13 RX ORDER — ALCOHOL 0.98 ML/ML
5 INJECTION INTRASPINAL ONCE
Status: DISCONTINUED | OUTPATIENT
Start: 2019-02-13 | End: 2019-02-13

## 2019-02-13 RX ORDER — ALCOHOL 0.98 ML/ML
20 INJECTION INTRASPINAL ONCE
Status: COMPLETED | OUTPATIENT
Start: 2019-02-13 | End: 2019-02-13

## 2019-02-13 RX ORDER — INSULIN GLARGINE 100 [IU]/ML
25 INJECTION, SOLUTION SUBCUTANEOUS DAILY
Status: DISCONTINUED | OUTPATIENT
Start: 2019-02-13 | End: 2019-02-15 | Stop reason: HOSPADM

## 2019-02-13 RX ORDER — DIAZEPAM 2 MG/1
2 TABLET ORAL NIGHTLY PRN
Status: DISCONTINUED | OUTPATIENT
Start: 2019-02-13 | End: 2019-02-15 | Stop reason: HOSPADM

## 2019-02-13 RX ADMIN — EMTRICITABINE AND TENOFOVIR DISOPROXIL FUMARATE 1 TABLET: 200; 300 TABLET, FILM COATED ORAL at 09:06

## 2019-02-13 RX ADMIN — INSULIN LISPRO 10 UNITS: 100 INJECTION, SOLUTION INTRAVENOUS; SUBCUTANEOUS at 15:12

## 2019-02-13 RX ADMIN — METOPROLOL SUCCINATE 12.5 MG: 25 TABLET, EXTENDED RELEASE ORAL at 09:04

## 2019-02-13 RX ADMIN — ONDANSETRON 4 MG: 2 INJECTION INTRAMUSCULAR; INTRAVENOUS at 00:02

## 2019-02-13 RX ADMIN — MIRTAZAPINE 45 MG: 30 TABLET, FILM COATED ORAL at 20:37

## 2019-02-13 RX ADMIN — APIXABAN 5 MG: 5 TABLET, FILM COATED ORAL at 09:06

## 2019-02-13 RX ADMIN — INSULIN LISPRO 8 UNITS: 100 INJECTION, SOLUTION INTRAVENOUS; SUBCUTANEOUS at 09:17

## 2019-02-13 RX ADMIN — DOLUTEGRAVIR SODIUM 50 MG: 50 TABLET, FILM COATED ORAL at 09:06

## 2019-02-13 RX ADMIN — MICONAZOLE NITRATE: 20 CREAM TOPICAL at 09:12

## 2019-02-13 RX ADMIN — MONTELUKAST SODIUM 10 MG: 10 TABLET, COATED ORAL at 20:37

## 2019-02-13 RX ADMIN — Medication 10 ML: at 20:38

## 2019-02-13 RX ADMIN — ASPIRIN 81 MG 81 MG: 81 TABLET ORAL at 09:06

## 2019-02-13 RX ADMIN — MICONAZOLE NITRATE: 20 CREAM TOPICAL at 20:37

## 2019-02-13 RX ADMIN — INSULIN LISPRO 10 UNITS: 100 INJECTION, SOLUTION INTRAVENOUS; SUBCUTANEOUS at 09:20

## 2019-02-13 RX ADMIN — FLUTICASONE PROPIONATE 1 SPRAY: 50 SPRAY, METERED NASAL at 09:12

## 2019-02-13 RX ADMIN — INSULIN GLARGINE 25 UNITS: 100 INJECTION, SOLUTION SUBCUTANEOUS at 11:32

## 2019-02-13 RX ADMIN — Medication 10 ML: at 20:37

## 2019-02-13 RX ADMIN — Medication 10 ML: at 09:08

## 2019-02-13 RX ADMIN — INSULIN LISPRO 10 UNITS: 100 INJECTION, SOLUTION INTRAVENOUS; SUBCUTANEOUS at 18:20

## 2019-02-13 RX ADMIN — APIXABAN 5 MG: 5 TABLET, FILM COATED ORAL at 20:37

## 2019-02-13 RX ADMIN — OXYCODONE AND ACETAMINOPHEN 2 TABLET: 5; 325 TABLET ORAL at 22:56

## 2019-02-13 RX ADMIN — INSULIN LISPRO 2 UNITS: 100 INJECTION, SOLUTION INTRAVENOUS; SUBCUTANEOUS at 15:13

## 2019-02-13 RX ADMIN — OXYCODONE AND ACETAMINOPHEN 1 TABLET: 5; 325 TABLET ORAL at 01:26

## 2019-02-13 RX ADMIN — DIGOXIN 125 MCG: 125 TABLET ORAL at 09:06

## 2019-02-13 RX ADMIN — ALCOHOL 20 ML: 0.98 INJECTION INTRASPINAL at 14:09

## 2019-02-13 RX ADMIN — IOPAMIDOL 50 ML: 612 INJECTION, SOLUTION INTRAVENOUS at 14:30

## 2019-02-13 RX ADMIN — FUROSEMIDE 40 MG: 40 TABLET ORAL at 09:06

## 2019-02-13 RX ADMIN — ATORVASTATIN CALCIUM 80 MG: 80 TABLET, FILM COATED ORAL at 20:37

## 2019-02-13 RX ADMIN — PANTOPRAZOLE SODIUM 40 MG: 40 TABLET, DELAYED RELEASE ORAL at 05:31

## 2019-02-13 RX ADMIN — ARIPIPRAZOLE 2 MG: 2 TABLET ORAL at 20:37

## 2019-02-13 ASSESSMENT — PAIN DESCRIPTION - ONSET
ONSET: ON-GOING
ONSET: ON-GOING

## 2019-02-13 ASSESSMENT — PAIN DESCRIPTION - FREQUENCY
FREQUENCY: CONTINUOUS
FREQUENCY: CONTINUOUS

## 2019-02-13 ASSESSMENT — PAIN DESCRIPTION - LOCATION
LOCATION: LEG
LOCATION: LEG

## 2019-02-13 ASSESSMENT — PAIN SCALES - GENERAL
PAINLEVEL_OUTOF10: 9
PAINLEVEL_OUTOF10: 6
PAINLEVEL_OUTOF10: 4

## 2019-02-13 ASSESSMENT — PAIN DESCRIPTION - PAIN TYPE
TYPE: CHRONIC PAIN
TYPE: CHRONIC PAIN

## 2019-02-13 ASSESSMENT — PAIN DESCRIPTION - ORIENTATION
ORIENTATION: LEFT
ORIENTATION: LEFT

## 2019-02-13 ASSESSMENT — PAIN DESCRIPTION - DESCRIPTORS
DESCRIPTORS: ACHING
DESCRIPTORS: ACHING

## 2019-02-13 ASSESSMENT — PAIN DESCRIPTION - PROGRESSION: CLINICAL_PROGRESSION: NOT CHANGED

## 2019-02-13 ASSESSMENT — PAIN - FUNCTIONAL ASSESSMENT: PAIN_FUNCTIONAL_ASSESSMENT: ACTIVITIES ARE NOT PREVENTED

## 2019-02-13 ASSESSMENT — PAIN DESCRIPTION - DIRECTION: RADIATING_TOWARDS: UPPER THIGH

## 2019-02-14 VITALS
HEART RATE: 82 BPM | RESPIRATION RATE: 16 BRPM | BODY MASS INDEX: 23.46 KG/M2 | SYSTOLIC BLOOD PRESSURE: 123 MMHG | WEIGHT: 182.8 LBS | DIASTOLIC BLOOD PRESSURE: 79 MMHG | HEIGHT: 74 IN | TEMPERATURE: 97.5 F | OXYGEN SATURATION: 94 %

## 2019-02-14 LAB
GLUCOSE BLD-MCNC: 152 MG/DL (ref 70–99)
GLUCOSE BLD-MCNC: 176 MG/DL (ref 70–99)
GLUCOSE BLD-MCNC: 72 MG/DL (ref 70–99)
GRAM STAIN RESULT: ABNORMAL
ORGANISM: ABNORMAL
PERFORMED ON: ABNORMAL
PERFORMED ON: ABNORMAL
PERFORMED ON: NORMAL
WOUND/ABSCESS: ABNORMAL
WOUND/ABSCESS: ABNORMAL

## 2019-02-14 PROCEDURE — APPNB30 APP NON BILLABLE TIME 0-30 MINS: Performed by: NURSE PRACTITIONER

## 2019-02-14 PROCEDURE — 87535 HIV-1 PROBE&REVERSE TRNSCRPJ: CPT

## 2019-02-14 PROCEDURE — 94760 N-INVAS EAR/PLS OXIMETRY 1: CPT

## 2019-02-14 PROCEDURE — 6370000000 HC RX 637 (ALT 250 FOR IP): Performed by: NURSE PRACTITIONER

## 2019-02-14 PROCEDURE — APPSS30 APP SPLIT SHARED TIME 16-30 MINUTES: Performed by: NURSE PRACTITIONER

## 2019-02-14 PROCEDURE — 2500000003 HC RX 250 WO HCPCS: Performed by: NURSE PRACTITIONER

## 2019-02-14 PROCEDURE — 86360 T CELL ABSOLUTE COUNT/RATIO: CPT

## 2019-02-14 PROCEDURE — 99233 SBSQ HOSP IP/OBS HIGH 50: CPT | Performed by: INTERNAL MEDICINE

## 2019-02-14 PROCEDURE — 2580000003 HC RX 258: Performed by: NURSE PRACTITIONER

## 2019-02-14 RX ORDER — POTASSIUM CHLORIDE 20 MEQ/1
20 TABLET, EXTENDED RELEASE ORAL 2 TIMES DAILY
Qty: 60 TABLET | Refills: 1 | Status: SHIPPED | OUTPATIENT
Start: 2019-02-14 | End: 2019-02-14

## 2019-02-14 RX ORDER — AMOXICILLIN AND CLAVULANATE POTASSIUM 875; 125 MG/1; MG/1
1 TABLET, FILM COATED ORAL 2 TIMES DAILY
Qty: 60 TABLET | Refills: 0 | Status: SHIPPED | OUTPATIENT
Start: 2019-02-14 | End: 2019-03-16

## 2019-02-14 RX ORDER — IBUPROFEN 200 MG
1 CAPSULE ORAL 2 TIMES DAILY
Qty: 30 TABLET | Refills: 1 | Status: SHIPPED | OUTPATIENT
Start: 2019-02-14 | End: 2019-02-14

## 2019-02-14 RX ORDER — FUROSEMIDE 40 MG/1
40 TABLET ORAL DAILY
Qty: 60 TABLET | Refills: 1 | Status: SHIPPED | OUTPATIENT
Start: 2019-02-14

## 2019-02-14 RX ORDER — FLUTICASONE PROPIONATE 50 MCG
SPRAY, SUSPENSION (ML) NASAL
Qty: 1 BOTTLE | Refills: 1 | Status: SHIPPED | OUTPATIENT
Start: 2019-02-14 | End: 2019-02-14

## 2019-02-14 RX ORDER — EMTRICITABINE AND TENOFOVIR DISOPROXIL FUMARATE 200; 300 MG/1; MG/1
1 TABLET, FILM COATED ORAL DAILY
Qty: 30 TABLET | Refills: 0 | Status: ON HOLD | OUTPATIENT
Start: 2019-02-14 | End: 2019-03-08

## 2019-02-14 RX ORDER — METOPROLOL SUCCINATE 25 MG/1
12.5 TABLET, EXTENDED RELEASE ORAL DAILY
Qty: 30 TABLET | Refills: 1 | Status: SHIPPED | OUTPATIENT
Start: 2019-02-14 | End: 2019-02-14

## 2019-02-14 RX ORDER — FLUTICASONE PROPIONATE 50 MCG
SPRAY, SUSPENSION (ML) NASAL
Qty: 1 BOTTLE | Refills: 1 | Status: SHIPPED | OUTPATIENT
Start: 2019-02-14

## 2019-02-14 RX ORDER — IBUPROFEN 200 MG
1 CAPSULE ORAL 2 TIMES DAILY
Qty: 30 TABLET | Refills: 1 | Status: SHIPPED | OUTPATIENT
Start: 2019-02-14

## 2019-02-14 RX ORDER — EMTRICITABINE AND TENOFOVIR DISOPROXIL FUMARATE 200; 300 MG/1; MG/1
1 TABLET, FILM COATED ORAL NIGHTLY
Qty: 30 TABLET | Refills: 1 | Status: SHIPPED | OUTPATIENT
Start: 2019-02-14

## 2019-02-14 RX ORDER — INSULIN GLARGINE 100 [IU]/ML
25 INJECTION, SOLUTION SUBCUTANEOUS DAILY
Qty: 1 VIAL | Refills: 1 | Status: SHIPPED | OUTPATIENT
Start: 2019-02-14 | End: 2019-02-14

## 2019-02-14 RX ORDER — EMTRICITABINE AND TENOFOVIR DISOPROXIL FUMARATE 200; 300 MG/1; MG/1
1 TABLET, FILM COATED ORAL NIGHTLY
Qty: 30 TABLET | Refills: 1 | Status: SHIPPED | OUTPATIENT
Start: 2019-02-14 | End: 2019-02-14

## 2019-02-14 RX ORDER — POTASSIUM CHLORIDE 20 MEQ/1
20 TABLET, EXTENDED RELEASE ORAL 2 TIMES DAILY
Qty: 60 TABLET | Refills: 1 | Status: SHIPPED | OUTPATIENT
Start: 2019-02-14

## 2019-02-14 RX ORDER — MONTELUKAST SODIUM 10 MG/1
10 TABLET ORAL NIGHTLY
Qty: 30 TABLET | Refills: 1 | Status: SHIPPED | OUTPATIENT
Start: 2019-02-14 | End: 2019-02-14

## 2019-02-14 RX ORDER — FUROSEMIDE 40 MG/1
40 TABLET ORAL DAILY
Qty: 60 TABLET | Refills: 1 | Status: SHIPPED | OUTPATIENT
Start: 2019-02-14 | End: 2019-02-14

## 2019-02-14 RX ORDER — CLINDAMYCIN PHOSPHATE 300 MG/50ML
300 INJECTION INTRAVENOUS EVERY 6 HOURS
Status: DISCONTINUED | OUTPATIENT
Start: 2019-02-14 | End: 2019-02-15 | Stop reason: HOSPADM

## 2019-02-14 RX ORDER — OXYCODONE HYDROCHLORIDE AND ACETAMINOPHEN 5; 325 MG/1; MG/1
1 TABLET ORAL EVERY 6 HOURS PRN
Qty: 28 TABLET | Refills: 0 | Status: SHIPPED | OUTPATIENT
Start: 2019-02-14 | End: 2019-02-21

## 2019-02-14 RX ORDER — ARIPIPRAZOLE 2 MG/1
2 TABLET ORAL NIGHTLY
Qty: 30 TABLET | Refills: 1 | Status: SHIPPED | OUTPATIENT
Start: 2019-02-14 | End: 2019-02-14

## 2019-02-14 RX ORDER — MONTELUKAST SODIUM 10 MG/1
10 TABLET ORAL NIGHTLY
Qty: 30 TABLET | Refills: 1 | Status: SHIPPED | OUTPATIENT
Start: 2019-02-14

## 2019-02-14 RX ORDER — MIRTAZAPINE 45 MG/1
45 TABLET, FILM COATED ORAL NIGHTLY
Qty: 30 TABLET | Refills: 1 | Status: SHIPPED | OUTPATIENT
Start: 2019-02-14 | End: 2019-02-14

## 2019-02-14 RX ORDER — CLINDAMYCIN HYDROCHLORIDE 300 MG/1
300 CAPSULE ORAL 4 TIMES DAILY
Qty: 40 CAPSULE | Refills: 0 | Status: SHIPPED | OUTPATIENT
Start: 2019-02-14 | End: 2019-02-14 | Stop reason: HOSPADM

## 2019-02-14 RX ORDER — ASPIRIN 81 MG/1
81 TABLET, CHEWABLE ORAL DAILY
Qty: 30 TABLET | Refills: 0 | Status: SHIPPED | OUTPATIENT
Start: 2019-02-14 | End: 2019-02-14

## 2019-02-14 RX ORDER — METOPROLOL SUCCINATE 25 MG/1
12.5 TABLET, EXTENDED RELEASE ORAL DAILY
Qty: 30 TABLET | Refills: 1 | Status: SHIPPED | OUTPATIENT
Start: 2019-02-14

## 2019-02-14 RX ORDER — DIGOXIN 125 MCG
125 TABLET ORAL DAILY
Qty: 30 TABLET | Refills: 1 | Status: SHIPPED | OUTPATIENT
Start: 2019-02-14

## 2019-02-14 RX ORDER — DOXYCYCLINE HYCLATE 100 MG/1
100 CAPSULE ORAL 2 TIMES DAILY
Qty: 60 CAPSULE | Refills: 0 | Status: SHIPPED | OUTPATIENT
Start: 2019-02-14 | End: 2019-03-16

## 2019-02-14 RX ORDER — ASPIRIN 81 MG/1
81 TABLET, CHEWABLE ORAL DAILY
Qty: 30 TABLET | Refills: 0 | Status: SHIPPED | OUTPATIENT
Start: 2019-02-14

## 2019-02-14 RX ORDER — CLINDAMYCIN HYDROCHLORIDE 300 MG/1
300 CAPSULE ORAL 4 TIMES DAILY
Qty: 40 CAPSULE | Refills: 0 | Status: SHIPPED | OUTPATIENT
Start: 2019-02-14 | End: 2019-02-14

## 2019-02-14 RX ORDER — INSULIN GLARGINE 100 [IU]/ML
25 INJECTION, SOLUTION SUBCUTANEOUS DAILY
Qty: 1 VIAL | Refills: 1 | Status: SHIPPED | OUTPATIENT
Start: 2019-02-14

## 2019-02-14 RX ORDER — MIRTAZAPINE 45 MG/1
45 TABLET, FILM COATED ORAL NIGHTLY
Qty: 30 TABLET | Refills: 1 | Status: SHIPPED | OUTPATIENT
Start: 2019-02-14

## 2019-02-14 RX ORDER — LACTOBACILLUS RHAMNOSUS GG 10B CELL
1 CAPSULE ORAL 2 TIMES DAILY WITH MEALS
Qty: 60 CAPSULE | Refills: 0 | Status: SHIPPED | OUTPATIENT
Start: 2019-02-14 | End: 2019-03-16

## 2019-02-14 RX ORDER — ATORVASTATIN CALCIUM 80 MG/1
TABLET, FILM COATED ORAL
Qty: 30 TABLET | Refills: 1 | Status: SHIPPED | OUTPATIENT
Start: 2019-02-14 | End: 2019-02-14

## 2019-02-14 RX ORDER — METRONIDAZOLE 500 MG/1
500 TABLET ORAL DAILY
Qty: 21 TABLET | Refills: 0 | Status: SHIPPED | OUTPATIENT
Start: 2019-02-14 | End: 2019-03-07

## 2019-02-14 RX ORDER — LACTOBACILLUS RHAMNOSUS GG 10B CELL
1 CAPSULE ORAL 2 TIMES DAILY WITH MEALS
Qty: 42 CAPSULE | Refills: 0 | Status: SHIPPED | OUTPATIENT
Start: 2019-02-14 | End: 2019-02-14

## 2019-02-14 RX ORDER — LACTOBACILLUS RHAMNOSUS GG 10B CELL
1 CAPSULE ORAL 2 TIMES DAILY WITH MEALS
Status: DISCONTINUED | OUTPATIENT
Start: 2019-02-14 | End: 2019-02-15 | Stop reason: HOSPADM

## 2019-02-14 RX ORDER — ATORVASTATIN CALCIUM 80 MG/1
TABLET, FILM COATED ORAL
Qty: 30 TABLET | Refills: 1 | Status: SHIPPED | OUTPATIENT
Start: 2019-02-14

## 2019-02-14 RX ORDER — ARIPIPRAZOLE 2 MG/1
2 TABLET ORAL NIGHTLY
Qty: 30 TABLET | Refills: 1 | Status: SHIPPED | OUTPATIENT
Start: 2019-02-14

## 2019-02-14 RX ORDER — DIGOXIN 125 MCG
125 TABLET ORAL DAILY
Qty: 30 TABLET | Refills: 1 | Status: SHIPPED | OUTPATIENT
Start: 2019-02-14 | End: 2019-02-14

## 2019-02-14 RX ORDER — METRONIDAZOLE 500 MG/1
500 TABLET ORAL DAILY
Qty: 21 TABLET | Refills: 0 | Status: SHIPPED | OUTPATIENT
Start: 2019-02-14 | End: 2019-02-14

## 2019-02-14 RX ADMIN — METOPROLOL SUCCINATE 12.5 MG: 25 TABLET, EXTENDED RELEASE ORAL at 08:20

## 2019-02-14 RX ADMIN — INSULIN GLARGINE 25 UNITS: 100 INJECTION, SOLUTION SUBCUTANEOUS at 08:21

## 2019-02-14 RX ADMIN — OXYCODONE AND ACETAMINOPHEN 2 TABLET: 5; 325 TABLET ORAL at 15:34

## 2019-02-14 RX ADMIN — INSULIN LISPRO 10 UNITS: 100 INJECTION, SOLUTION INTRAVENOUS; SUBCUTANEOUS at 17:26

## 2019-02-14 RX ADMIN — DOLUTEGRAVIR SODIUM 50 MG: 50 TABLET, FILM COATED ORAL at 08:20

## 2019-02-14 RX ADMIN — OXYCODONE AND ACETAMINOPHEN 2 TABLET: 5; 325 TABLET ORAL at 02:59

## 2019-02-14 RX ADMIN — PANTOPRAZOLE SODIUM 40 MG: 40 TABLET, DELAYED RELEASE ORAL at 05:42

## 2019-02-14 RX ADMIN — INSULIN LISPRO 2 UNITS: 100 INJECTION, SOLUTION INTRAVENOUS; SUBCUTANEOUS at 17:23

## 2019-02-14 RX ADMIN — INSULIN LISPRO 2 UNITS: 100 INJECTION, SOLUTION INTRAVENOUS; SUBCUTANEOUS at 08:21

## 2019-02-14 RX ADMIN — CLINDAMYCIN PHOSPHATE 300 MG: 6 INJECTION, SOLUTION INTRAMUSCULAR; INTRAVENOUS at 16:10

## 2019-02-14 RX ADMIN — MIRTAZAPINE 45 MG: 30 TABLET, FILM COATED ORAL at 19:56

## 2019-02-14 RX ADMIN — FUROSEMIDE 40 MG: 40 TABLET ORAL at 08:20

## 2019-02-14 RX ADMIN — INSULIN LISPRO 10 UNITS: 100 INJECTION, SOLUTION INTRAVENOUS; SUBCUTANEOUS at 08:28

## 2019-02-14 RX ADMIN — ARIPIPRAZOLE 2 MG: 2 TABLET ORAL at 21:00

## 2019-02-14 RX ADMIN — Medication 1 CAPSULE: at 11:05

## 2019-02-14 RX ADMIN — CLINDAMYCIN PHOSPHATE 300 MG: 6 INJECTION, SOLUTION INTRAMUSCULAR; INTRAVENOUS at 11:05

## 2019-02-14 RX ADMIN — Medication 1 CAPSULE: at 18:15

## 2019-02-14 RX ADMIN — APIXABAN 5 MG: 5 TABLET, FILM COATED ORAL at 19:56

## 2019-02-14 RX ADMIN — ASPIRIN 81 MG 81 MG: 81 TABLET ORAL at 08:21

## 2019-02-14 RX ADMIN — APIXABAN 5 MG: 5 TABLET, FILM COATED ORAL at 08:20

## 2019-02-14 RX ADMIN — MICONAZOLE NITRATE: 20 CREAM TOPICAL at 08:37

## 2019-02-14 RX ADMIN — MONTELUKAST SODIUM 10 MG: 10 TABLET, COATED ORAL at 19:56

## 2019-02-14 RX ADMIN — OXYCODONE AND ACETAMINOPHEN 1 TABLET: 5; 325 TABLET ORAL at 19:58

## 2019-02-14 RX ADMIN — Medication 10 ML: at 08:38

## 2019-02-14 RX ADMIN — FLUTICASONE PROPIONATE 1 SPRAY: 50 SPRAY, METERED NASAL at 08:37

## 2019-02-14 RX ADMIN — DIGOXIN 125 MCG: 125 TABLET ORAL at 08:20

## 2019-02-14 RX ADMIN — ATORVASTATIN CALCIUM 80 MG: 80 TABLET, FILM COATED ORAL at 19:56

## 2019-02-14 RX ADMIN — DIAZEPAM 2 MG: 2 TABLET ORAL at 01:01

## 2019-02-14 RX ADMIN — EMTRICITABINE AND TENOFOVIR DISOPROXIL FUMARATE 1 TABLET: 200; 300 TABLET, FILM COATED ORAL at 08:19

## 2019-02-14 ASSESSMENT — PAIN SCALES - GENERAL
PAINLEVEL_OUTOF10: 8
PAINLEVEL_OUTOF10: 2
PAINLEVEL_OUTOF10: 9
PAINLEVEL_OUTOF10: 4
PAINLEVEL_OUTOF10: 5

## 2019-02-14 ASSESSMENT — PAIN DESCRIPTION - PROGRESSION
CLINICAL_PROGRESSION: NOT CHANGED

## 2019-02-14 ASSESSMENT — PAIN DESCRIPTION - PAIN TYPE
TYPE: CHRONIC PAIN

## 2019-02-14 ASSESSMENT — PAIN DESCRIPTION - FREQUENCY
FREQUENCY: CONTINUOUS

## 2019-02-14 ASSESSMENT — PAIN DESCRIPTION - ORIENTATION
ORIENTATION: LEFT

## 2019-02-14 ASSESSMENT — PAIN DESCRIPTION - LOCATION
LOCATION: LEG

## 2019-02-14 ASSESSMENT — PAIN DESCRIPTION - ONSET
ONSET: ON-GOING

## 2019-02-14 ASSESSMENT — PAIN DESCRIPTION - DESCRIPTORS
DESCRIPTORS: ACHING
DESCRIPTORS: ACHING
DESCRIPTORS: ACHING;DISCOMFORT
DESCRIPTORS: ACHING

## 2019-02-14 ASSESSMENT — PAIN DESCRIPTION - DIRECTION: RADIATING_TOWARDS: UPPER THIGH

## 2019-02-18 LAB — HIV-1 DNA, QUAL, PCR: DETECTED

## 2019-02-25 ENCOUNTER — TELEPHONE (OUTPATIENT)
Dept: VASCULAR SURGERY | Age: 59
End: 2019-02-25

## 2019-03-06 ENCOUNTER — TELEPHONE (OUTPATIENT)
Dept: SURGERY | Age: 59
End: 2019-03-06

## 2019-03-07 DIAGNOSIS — T14.8XXA WOUND INFECTION: ICD-10-CM

## 2019-03-07 DIAGNOSIS — L08.9 WOUND INFECTION: ICD-10-CM

## 2019-03-07 DIAGNOSIS — S81.802A WOUNDS, MULTIPLE OPEN, LOWER EXTREMITY, LEFT, INITIAL ENCOUNTER: Primary | ICD-10-CM

## 2019-03-08 ENCOUNTER — HOSPITAL ENCOUNTER (INPATIENT)
Age: 59
LOS: 1 days | Discharge: ANOTHER HOSPITAL | DRG: 974 | End: 2019-03-08
Attending: EMERGENCY MEDICINE | Admitting: INTERNAL MEDICINE
Payer: MEDICARE

## 2019-03-08 ENCOUNTER — APPOINTMENT (OUTPATIENT)
Dept: GENERAL RADIOLOGY | Age: 59
DRG: 974 | End: 2019-03-08
Payer: MEDICARE

## 2019-03-08 ENCOUNTER — APPOINTMENT (OUTPATIENT)
Dept: CT IMAGING | Age: 59
DRG: 974 | End: 2019-03-08
Payer: MEDICARE

## 2019-03-08 VITALS
HEIGHT: 74 IN | WEIGHT: 189.6 LBS | TEMPERATURE: 98 F | OXYGEN SATURATION: 97 % | DIASTOLIC BLOOD PRESSURE: 91 MMHG | HEART RATE: 105 BPM | RESPIRATION RATE: 14 BRPM | BODY MASS INDEX: 24.33 KG/M2 | SYSTOLIC BLOOD PRESSURE: 123 MMHG

## 2019-03-08 DIAGNOSIS — N17.9 AKI (ACUTE KIDNEY INJURY) (HCC): ICD-10-CM

## 2019-03-08 DIAGNOSIS — E87.20 LACTIC ACIDOSIS: ICD-10-CM

## 2019-03-08 DIAGNOSIS — K22.3 ESOPHAGEAL PERFORATION: Primary | ICD-10-CM

## 2019-03-08 DIAGNOSIS — R77.8 ELEVATED TROPONIN: ICD-10-CM

## 2019-03-08 DIAGNOSIS — E87.29 HIGH ANION GAP METABOLIC ACIDOSIS: ICD-10-CM

## 2019-03-08 DIAGNOSIS — R73.9 HYPERGLYCEMIA: ICD-10-CM

## 2019-03-08 LAB
ABO/RH: NORMAL
ALBUMIN SERPL-MCNC: 2.5 G/DL (ref 3.4–5)
ALP BLD-CCNC: 109 U/L (ref 40–129)
ALT SERPL-CCNC: 76 U/L (ref 10–40)
AMMONIA: 22 UMOL/L (ref 16–60)
ANION GAP SERPL CALCULATED.3IONS-SCNC: 17 MMOL/L (ref 3–16)
ANION GAP SERPL CALCULATED.3IONS-SCNC: 30 MMOL/L (ref 3–16)
ANTIBODY SCREEN: NORMAL
APTT: 40.5 SEC (ref 26–36)
AST SERPL-CCNC: 180 U/L (ref 15–37)
BASE EXCESS VENOUS: -7.2 MMOL/L
BASOPHILS ABSOLUTE: 0.1 K/UL (ref 0–0.2)
BASOPHILS RELATIVE PERCENT: 0.6 %
BILIRUB SERPL-MCNC: 1.6 MG/DL (ref 0–1)
BILIRUBIN DIRECT: 1.3 MG/DL (ref 0–0.3)
BILIRUBIN URINE: NEGATIVE
BILIRUBIN, INDIRECT: 0.3 MG/DL (ref 0–1)
BLOOD BANK DISPENSE STATUS: NORMAL
BLOOD BANK DISPENSE STATUS: NORMAL
BLOOD BANK PRODUCT CODE: NORMAL
BLOOD BANK PRODUCT CODE: NORMAL
BLOOD, URINE: ABNORMAL
BPU ID: NORMAL
BPU ID: NORMAL
BUN BLDV-MCNC: 84 MG/DL (ref 7–20)
BUN BLDV-MCNC: 89 MG/DL (ref 7–20)
CALCIUM SERPL-MCNC: 7.2 MG/DL (ref 8.3–10.6)
CALCIUM SERPL-MCNC: 8.3 MG/DL (ref 8.3–10.6)
CARBOXYHEMOGLOBIN: 3.2 %
CASTS: ABNORMAL /LPF
CHLORIDE BLD-SCNC: 87 MMOL/L (ref 99–110)
CHLORIDE BLD-SCNC: 88 MMOL/L (ref 99–110)
CLARITY: ABNORMAL
CO2: 14 MMOL/L (ref 21–32)
CO2: 22 MMOL/L (ref 21–32)
COLOR: YELLOW
COMMENT UA: ABNORMAL
CREAT SERPL-MCNC: 2.4 MG/DL (ref 0.9–1.3)
CREAT SERPL-MCNC: 2.5 MG/DL (ref 0.9–1.3)
DESCRIPTION BLOOD BANK: NORMAL
DESCRIPTION BLOOD BANK: NORMAL
DIGOXIN LEVEL: <0.3 NG/ML (ref 0.8–2)
EOSINOPHILS ABSOLUTE: 0 K/UL (ref 0–0.6)
EOSINOPHILS RELATIVE PERCENT: 0 %
EPITHELIAL CELLS, UA: 3 /HPF (ref 0–5)
ESTIMATED AVERAGE GLUCOSE: 231.7 MG/DL
GFR AFRICAN AMERICAN: 32
GFR AFRICAN AMERICAN: 34
GFR NON-AFRICAN AMERICAN: 27
GFR NON-AFRICAN AMERICAN: 28
GLUCOSE BLD-MCNC: 343 MG/DL (ref 70–99)
GLUCOSE BLD-MCNC: 368 MG/DL (ref 70–99)
GLUCOSE BLD-MCNC: 376 MG/DL (ref 70–99)
GLUCOSE BLD-MCNC: 378 MG/DL (ref 70–99)
GLUCOSE BLD-MCNC: 386 MG/DL (ref 70–99)
GLUCOSE URINE: 100 MG/DL
HBA1C MFR BLD: 9.7 %
HCO3 VENOUS: 18 MMOL/L (ref 23–29)
HCT VFR BLD CALC: 37.3 % (ref 40.5–52.5)
HEMOGLOBIN: 11.4 G/DL (ref 13.5–17.5)
INR BLD: 3.22 (ref 0.86–1.14)
KETONES, URINE: NEGATIVE MG/DL
LACTIC ACID: 5.5 MMOL/L (ref 0.4–2)
LACTIC ACID: 9.4 MMOL/L (ref 0.4–2)
LEUKOCYTE ESTERASE, URINE: NEGATIVE
LYMPHOCYTES ABSOLUTE: 0.5 K/UL (ref 1–5.1)
LYMPHOCYTES RELATIVE PERCENT: 4.4 %
MCH RBC QN AUTO: 24.8 PG (ref 26–34)
MCHC RBC AUTO-ENTMCNC: 30.6 G/DL (ref 31–36)
MCV RBC AUTO: 81 FL (ref 80–100)
METHEMOGLOBIN VENOUS: 0.7 %
MICROSCOPIC EXAMINATION: YES
MONOCYTES ABSOLUTE: 0.6 K/UL (ref 0–1.3)
MONOCYTES RELATIVE PERCENT: 5.8 %
NEUTROPHILS ABSOLUTE: 9.1 K/UL (ref 1.7–7.7)
NEUTROPHILS RELATIVE PERCENT: 89.2 %
NITRITE, URINE: NEGATIVE
O2 CONTENT, VEN: 14 ML/DL
O2 SAT, VEN: 88 %
O2 THERAPY: ABNORMAL
PCO2, VEN: 37.9 MMHG (ref 40–50)
PDW BLD-RTO: 20.3 % (ref 12.4–15.4)
PERFORMED ON: ABNORMAL
PH UA: 5 (ref 5–8)
PH VENOUS: 7.3 (ref 7.35–7.45)
PLATELET # BLD: 274 K/UL (ref 135–450)
PMV BLD AUTO: 7.4 FL (ref 5–10.5)
PO2, VEN: 67 MMHG
POTASSIUM REFLEX MAGNESIUM: 5.9 MMOL/L (ref 3.5–5.1)
POTASSIUM SERPL-SCNC: 5.1 MMOL/L (ref 3.5–5.1)
PRO-BNP: ABNORMAL PG/ML (ref 0–124)
PROTEIN UA: 30 MG/DL
PROTHROMBIN TIME: 36.7 SEC (ref 9.8–13)
RBC # BLD: 4.6 M/UL (ref 4.2–5.9)
RBC UA: 6 /HPF (ref 0–4)
SODIUM BLD-SCNC: 126 MMOL/L (ref 136–145)
SODIUM BLD-SCNC: 132 MMOL/L (ref 136–145)
SPECIFIC GRAVITY UA: 1.02 (ref 1–1.03)
TCO2 CALC VENOUS: 19 MMOL/L
TOTAL PROTEIN: 6.2 G/DL (ref 6.4–8.2)
TROPONIN: 0.1 NG/ML
URINE REFLEX TO CULTURE: YES
URINE TYPE: ABNORMAL
UROBILINOGEN, URINE: 0.2 E.U./DL
WBC # BLD: 10.2 K/UL (ref 4–11)
WBC UA: 11 /HPF (ref 0–5)

## 2019-03-08 PROCEDURE — 2580000003 HC RX 258: Performed by: EMERGENCY MEDICINE

## 2019-03-08 PROCEDURE — 6360000002 HC RX W HCPCS: Performed by: EMERGENCY MEDICINE

## 2019-03-08 PROCEDURE — 80076 HEPATIC FUNCTION PANEL: CPT

## 2019-03-08 PROCEDURE — 81001 URINALYSIS AUTO W/SCOPE: CPT

## 2019-03-08 PROCEDURE — 82140 ASSAY OF AMMONIA: CPT

## 2019-03-08 PROCEDURE — 6360000002 HC RX W HCPCS: Performed by: INTERNAL MEDICINE

## 2019-03-08 PROCEDURE — 6370000000 HC RX 637 (ALT 250 FOR IP): Performed by: INTERNAL MEDICINE

## 2019-03-08 PROCEDURE — 36430 TRANSFUSION BLD/BLD COMPNT: CPT

## 2019-03-08 PROCEDURE — 80048 BASIC METABOLIC PNL TOTAL CA: CPT

## 2019-03-08 PROCEDURE — 36415 COLL VENOUS BLD VENIPUNCTURE: CPT

## 2019-03-08 PROCEDURE — 83605 ASSAY OF LACTIC ACID: CPT

## 2019-03-08 PROCEDURE — C9132 KCENTRA, PER I.U.: HCPCS | Performed by: INTERNAL MEDICINE

## 2019-03-08 PROCEDURE — 71045 X-RAY EXAM CHEST 1 VIEW: CPT

## 2019-03-08 PROCEDURE — 84484 ASSAY OF TROPONIN QUANT: CPT

## 2019-03-08 PROCEDURE — 94762 N-INVAS EAR/PLS OXIMTRY CONT: CPT

## 2019-03-08 PROCEDURE — 96365 THER/PROPH/DIAG IV INF INIT: CPT

## 2019-03-08 PROCEDURE — 6360000004 HC RX CONTRAST MEDICATION: Performed by: EMERGENCY MEDICINE

## 2019-03-08 PROCEDURE — 96368 THER/DIAG CONCURRENT INF: CPT

## 2019-03-08 PROCEDURE — 2500000003 HC RX 250 WO HCPCS: Performed by: EMERGENCY MEDICINE

## 2019-03-08 PROCEDURE — 86850 RBC ANTIBODY SCREEN: CPT

## 2019-03-08 PROCEDURE — 99291 CRITICAL CARE FIRST HOUR: CPT | Performed by: INTERNAL MEDICINE

## 2019-03-08 PROCEDURE — 93010 ELECTROCARDIOGRAM REPORT: CPT | Performed by: INTERNAL MEDICINE

## 2019-03-08 PROCEDURE — 2580000003 HC RX 258: Performed by: INTERNAL MEDICINE

## 2019-03-08 PROCEDURE — 82803 BLOOD GASES ANY COMBINATION: CPT

## 2019-03-08 PROCEDURE — C9113 INJ PANTOPRAZOLE SODIUM, VIA: HCPCS | Performed by: INTERNAL MEDICINE

## 2019-03-08 PROCEDURE — 2000000000 HC ICU R&B

## 2019-03-08 PROCEDURE — 83880 ASSAY OF NATRIURETIC PEPTIDE: CPT

## 2019-03-08 PROCEDURE — 96376 TX/PRO/DX INJ SAME DRUG ADON: CPT

## 2019-03-08 PROCEDURE — C9113 INJ PANTOPRAZOLE SODIUM, VIA: HCPCS | Performed by: EMERGENCY MEDICINE

## 2019-03-08 PROCEDURE — 83036 HEMOGLOBIN GLYCOSYLATED A1C: CPT

## 2019-03-08 PROCEDURE — 87040 BLOOD CULTURE FOR BACTERIA: CPT

## 2019-03-08 PROCEDURE — 86901 BLOOD TYPING SEROLOGIC RH(D): CPT

## 2019-03-08 PROCEDURE — 85730 THROMBOPLASTIN TIME PARTIAL: CPT

## 2019-03-08 PROCEDURE — 6370000000 HC RX 637 (ALT 250 FOR IP): Performed by: EMERGENCY MEDICINE

## 2019-03-08 PROCEDURE — 2700000000 HC OXYGEN THERAPY PER DAY

## 2019-03-08 PROCEDURE — 85025 COMPLETE CBC W/AUTO DIFF WBC: CPT

## 2019-03-08 PROCEDURE — 71260 CT THORAX DX C+: CPT

## 2019-03-08 PROCEDURE — 99291 CRITICAL CARE FIRST HOUR: CPT

## 2019-03-08 PROCEDURE — 96375 TX/PRO/DX INJ NEW DRUG ADDON: CPT

## 2019-03-08 PROCEDURE — 99223 1ST HOSP IP/OBS HIGH 75: CPT | Performed by: SURGERY

## 2019-03-08 PROCEDURE — 4500000026 HC ED CRITICAL CARE PROCEDURE

## 2019-03-08 PROCEDURE — 94664 DEMO&/EVAL PT USE INHALER: CPT

## 2019-03-08 PROCEDURE — P9017 PLASMA 1 DONOR FRZ W/IN 8 HR: HCPCS

## 2019-03-08 PROCEDURE — 02HV33Z INSERTION OF INFUSION DEVICE INTO SUPERIOR VENA CAVA, PERCUTANEOUS APPROACH: ICD-10-PCS | Performed by: EMERGENCY MEDICINE

## 2019-03-08 PROCEDURE — 86900 BLOOD TYPING SEROLOGIC ABO: CPT

## 2019-03-08 PROCEDURE — 94640 AIRWAY INHALATION TREATMENT: CPT

## 2019-03-08 PROCEDURE — 93005 ELECTROCARDIOGRAM TRACING: CPT | Performed by: EMERGENCY MEDICINE

## 2019-03-08 PROCEDURE — 85610 PROTHROMBIN TIME: CPT

## 2019-03-08 PROCEDURE — 36556 INSERT NON-TUNNEL CV CATH: CPT | Performed by: NURSE PRACTITIONER

## 2019-03-08 PROCEDURE — 96361 HYDRATE IV INFUSION ADD-ON: CPT

## 2019-03-08 PROCEDURE — 80162 ASSAY OF DIGOXIN TOTAL: CPT

## 2019-03-08 PROCEDURE — 87086 URINE CULTURE/COLONY COUNT: CPT

## 2019-03-08 RX ORDER — MORPHINE SULFATE 4 MG/ML
4 INJECTION, SOLUTION INTRAMUSCULAR; INTRAVENOUS ONCE
Status: COMPLETED | OUTPATIENT
Start: 2019-03-08 | End: 2019-03-08

## 2019-03-08 RX ORDER — SODIUM CHLORIDE 9 MG/ML
INJECTION, SOLUTION INTRAVENOUS CONTINUOUS
Status: DISCONTINUED | OUTPATIENT
Start: 2019-03-08 | End: 2019-03-08

## 2019-03-08 RX ORDER — 0.9 % SODIUM CHLORIDE 0.9 %
250 INTRAVENOUS SOLUTION INTRAVENOUS ONCE
Status: DISCONTINUED | OUTPATIENT
Start: 2019-03-08 | End: 2019-03-08 | Stop reason: HOSPADM

## 2019-03-08 RX ORDER — PANTOPRAZOLE SODIUM 40 MG/10ML
80 INJECTION, POWDER, LYOPHILIZED, FOR SOLUTION INTRAVENOUS ONCE
Status: COMPLETED | OUTPATIENT
Start: 2019-03-08 | End: 2019-03-08

## 2019-03-08 RX ORDER — DEXTROSE MONOHYDRATE 25 G/50ML
12.5 INJECTION, SOLUTION INTRAVENOUS PRN
Status: DISCONTINUED | OUTPATIENT
Start: 2019-03-08 | End: 2019-03-08 | Stop reason: HOSPADM

## 2019-03-08 RX ORDER — 0.9 % SODIUM CHLORIDE 0.9 %
1000 INTRAVENOUS SOLUTION INTRAVENOUS ONCE
Status: COMPLETED | OUTPATIENT
Start: 2019-03-08 | End: 2019-03-08

## 2019-03-08 RX ORDER — FLUTICASONE PROPIONATE 110 UG/1
2 AEROSOL, METERED RESPIRATORY (INHALATION) 2 TIMES DAILY
Status: DISCONTINUED | OUTPATIENT
Start: 2019-03-08 | End: 2019-03-08 | Stop reason: HOSPADM

## 2019-03-08 RX ORDER — DEXTROSE MONOHYDRATE 50 MG/ML
100 INJECTION, SOLUTION INTRAVENOUS PRN
Status: DISCONTINUED | OUTPATIENT
Start: 2019-03-08 | End: 2019-03-08 | Stop reason: HOSPADM

## 2019-03-08 RX ORDER — SODIUM CHLORIDE 0.9 % (FLUSH) 0.9 %
10 SYRINGE (ML) INJECTION PRN
Status: DISCONTINUED | OUTPATIENT
Start: 2019-03-08 | End: 2019-03-08 | Stop reason: HOSPADM

## 2019-03-08 RX ORDER — ONDANSETRON 2 MG/ML
4 INJECTION INTRAMUSCULAR; INTRAVENOUS EVERY 6 HOURS PRN
Status: DISCONTINUED | OUTPATIENT
Start: 2019-03-08 | End: 2019-03-08

## 2019-03-08 RX ORDER — FLUCONAZOLE 2 MG/ML
100 INJECTION, SOLUTION INTRAVENOUS EVERY 24 HOURS
Status: DISCONTINUED | OUTPATIENT
Start: 2019-03-08 | End: 2019-03-08

## 2019-03-08 RX ORDER — SODIUM CHLORIDE 0.9 % (FLUSH) 0.9 %
10 SYRINGE (ML) INJECTION EVERY 12 HOURS SCHEDULED
Status: DISCONTINUED | OUTPATIENT
Start: 2019-03-08 | End: 2019-03-08 | Stop reason: HOSPADM

## 2019-03-08 RX ORDER — NICOTINE POLACRILEX 4 MG
15 LOZENGE BUCCAL PRN
Status: DISCONTINUED | OUTPATIENT
Start: 2019-03-08 | End: 2019-03-08 | Stop reason: HOSPADM

## 2019-03-08 RX ORDER — MORPHINE SULFATE 2 MG/ML
2 INJECTION, SOLUTION INTRAMUSCULAR; INTRAVENOUS EVERY 4 HOURS PRN
Status: DISCONTINUED | OUTPATIENT
Start: 2019-03-08 | End: 2019-03-08

## 2019-03-08 RX ORDER — SODIUM CHLORIDE 9 MG/ML
INJECTION, SOLUTION INTRAVENOUS CONTINUOUS
Status: DISCONTINUED | OUTPATIENT
Start: 2019-03-08 | End: 2019-03-08 | Stop reason: HOSPADM

## 2019-03-08 RX ADMIN — INSULIN LISPRO 6 UNITS: 100 INJECTION, SOLUTION INTRAVENOUS; SUBCUTANEOUS at 12:03

## 2019-03-08 RX ADMIN — INSULIN LISPRO 5 UNITS: 100 INJECTION, SOLUTION INTRAVENOUS; SUBCUTANEOUS at 09:09

## 2019-03-08 RX ADMIN — PANTOPRAZOLE SODIUM 80 MG: 40 INJECTION, POWDER, FOR SOLUTION INTRAVENOUS at 06:36

## 2019-03-08 RX ADMIN — PROTHROMBIN, COAGULATION FACTOR VII HUMAN, COAGULATION FACTOR IX HUMAN, COAGULATION FACTOR X HUMAN, PROTEIN C, PROTEIN S HUMAN, AND WATER 4563 UNITS: KIT at 10:42

## 2019-03-08 RX ADMIN — PIPERACILLIN SODIUM,TAZOBACTAM SODIUM 3.38 G: 3; .375 INJECTION, POWDER, FOR SOLUTION INTRAVENOUS at 07:37

## 2019-03-08 RX ADMIN — MICAFUNGIN SODIUM 100 MG: 20 INJECTION, POWDER, LYOPHILIZED, FOR SOLUTION INTRAVENOUS at 10:12

## 2019-03-08 RX ADMIN — DIATRIZOATE MEGLUMINE AND DIATRIZOATE SODIUM 50 ML: 600; 100 SOLUTION ORAL; RECTAL at 06:11

## 2019-03-08 RX ADMIN — SODIUM BICARBONATE: 84 INJECTION, SOLUTION INTRAVENOUS at 07:54

## 2019-03-08 RX ADMIN — SODIUM CHLORIDE 1000 ML: 9 INJECTION, SOLUTION INTRAVENOUS at 05:24

## 2019-03-08 RX ADMIN — SODIUM CHLORIDE 250 ML: 9 INJECTION, SOLUTION INTRAVENOUS at 08:50

## 2019-03-08 RX ADMIN — INSULIN HUMAN 5 UNITS: 100 INJECTION, SOLUTION PARENTERAL at 07:32

## 2019-03-08 RX ADMIN — HYDROMORPHONE HYDROCHLORIDE 1 MG: 1 INJECTION, SOLUTION INTRAMUSCULAR; INTRAVENOUS; SUBCUTANEOUS at 06:35

## 2019-03-08 RX ADMIN — PHYTONADIONE 10 MG: 10 INJECTION, EMULSION INTRAMUSCULAR; INTRAVENOUS; SUBCUTANEOUS at 07:48

## 2019-03-08 RX ADMIN — MORPHINE SULFATE 4 MG: 4 INJECTION INTRAVENOUS at 05:24

## 2019-03-08 RX ADMIN — Medication 10 ML: at 08:55

## 2019-03-08 RX ADMIN — VANCOMYCIN HYDROCHLORIDE 1000 MG: 1 INJECTION, POWDER, LYOPHILIZED, FOR SOLUTION INTRAVENOUS at 08:05

## 2019-03-08 RX ADMIN — SODIUM CHLORIDE 1000 ML: 9 INJECTION, SOLUTION INTRAVENOUS at 07:23

## 2019-03-08 RX ADMIN — SODIUM CHLORIDE: 9 INJECTION, SOLUTION INTRAVENOUS at 08:55

## 2019-03-08 RX ADMIN — SODIUM CHLORIDE 8 MG/HR: 9 INJECTION, SOLUTION INTRAVENOUS at 09:32

## 2019-03-08 RX ADMIN — MORPHINE SULFATE 4 MG: 4 INJECTION INTRAVENOUS at 07:37

## 2019-03-08 RX ADMIN — Medication 2 PUFF: at 09:01

## 2019-03-08 ASSESSMENT — PAIN DESCRIPTION - LOCATION
LOCATION: GENERALIZED
LOCATION: GENERALIZED
LOCATION: LEG

## 2019-03-08 ASSESSMENT — PAIN SCALES - GENERAL
PAINLEVEL_OUTOF10: 8
PAINLEVEL_OUTOF10: 9
PAINLEVEL_OUTOF10: 5
PAINLEVEL_OUTOF10: 8

## 2019-03-08 ASSESSMENT — ENCOUNTER SYMPTOMS
COLOR CHANGE: 0
SHORTNESS OF BREATH: 0
CONSTIPATION: 0
CHOKING: 0
BLOOD IN STOOL: 0
EYE PAIN: 0
ABDOMINAL DISTENTION: 0
RECTAL PAIN: 0
ANAL BLEEDING: 0
PHOTOPHOBIA: 0
EYE REDNESS: 0
NAUSEA: 0
COUGH: 1
CHEST TIGHTNESS: 0
EYE DISCHARGE: 0
ABDOMINAL PAIN: 0
DIARRHEA: 0
VOMITING: 0
APNEA: 0
WHEEZING: 0
BACK PAIN: 0
EYE ITCHING: 0
STRIDOR: 0

## 2019-03-08 ASSESSMENT — PAIN DESCRIPTION - PAIN TYPE: TYPE: ACUTE PAIN

## 2019-03-08 ASSESSMENT — PAIN DESCRIPTION - ORIENTATION: ORIENTATION: RIGHT;LEFT

## 2019-03-09 LAB — URINE CULTURE, ROUTINE: NORMAL

## 2019-03-13 LAB
BLOOD CULTURE, ROUTINE: NORMAL
CULTURE, BLOOD 2: NORMAL

## 2019-03-19 LAB
EKG ATRIAL RATE: 88 BPM
EKG DIAGNOSIS: NORMAL
EKG Q-T INTERVAL: 406 MS
EKG QRS DURATION: 134 MS
EKG QTC CALCULATION (BAZETT): 571 MS
EKG R AXIS: 237 DEGREES
EKG T AXIS: 38 DEGREES
EKG VENTRICULAR RATE: 119 BPM

## 2024-11-21 NOTE — PROGRESS NOTES
Device was checked during today's visit. It shows adequate battery longevity. Stable lead trends, sensing, and impedances were noted. Remote check in 3 months. Follow up in 6 months.     Summary:   IN PERSON DEVICE CHECK:     The device was programmed to check thresholds, lead measurements and assess underlying rhythm.  The in-person device check shows NORMAL FUNCTION.     Presenting Rhythm:    Underlying Rhythm:  CHB  Mode:  VVIR 80-130BPM  Battery Voltage/Longevity:  12.8 YEARS  Percent Pacing:  :100%  Tachycardia Detections/Episodes:  0 EPISODES/EVENTS  See device parameters/results in the media.     elevated creatinine,   gentle iv fluids  Maintain blood pressure with Joseluis-Synephrine      Code Status: Full Code        Dispo - Continue care        The patient and / or the family were informed of the results of any tests, a time was given to answer questions, a plan was proposed and they agreed with plan. Moni Garcia MD    This note was transcribed using [id number] [employer]. Please disregard any translational errors.

## (undated) DEVICE — STAPLER SKIN H3.9MM WIRE DIA0.58MM CRWN 6.9MM 35 STPL ROT

## (undated) DEVICE — PICO SINGLE USE NEGATIVE PRESSURE WOUND THERAPY SYSTEM 10CM X 30CM 4IN. X 12IN.: Brand: PICO

## (undated) DEVICE — SMALL (GREEN) FOR GRAFTS UP TO 8 MM, 20 1/2" / 52 CM (1/PKG): Brand: SCANLAN® VASCULAR TUNNELER SHEATHS AND BULLET TIPS

## (undated) DEVICE — TIP CLN S STL ES DEV

## (undated) DEVICE — MINOR SET UP PK

## (undated) DEVICE — DRAPE,REIN 53X77,STERILE: Brand: MEDLINE

## (undated) DEVICE — Z DISCONTINUED USE 2425483 (LOW STOCK PER MEDLINE) TAPE UMB L18IN DIA1/8IN WHT COT NONABSORBABLE W/O NDL FOR

## (undated) DEVICE — CANISTER, RIGID, 1200CC: Brand: MEDLINE INDUSTRIES, INC.

## (undated) DEVICE — SUTURE PROL SZ 6-0 L24IN NONABSORBABLE BLU L9.3MM BV-1 3/8 8805H

## (undated) DEVICE — MAJOR SET UP PK

## (undated) DEVICE — BANDAGE,GAUZE,BULKEE II,4.5"X4.1YD,STRL: Brand: MEDLINE

## (undated) DEVICE — GLOVE SURG SZ 8 L12IN FNGR THK87MIL WHT LTX FREE

## (undated) DEVICE — SUTURE PERMAHAND SZ 2-0 L30IN NONABSORBABLE BLK SILK W/O A305H

## (undated) DEVICE — CLIP LIG SM TI 6 BLU HNDL FOR OPN AND ENDOSCP SGL APPL

## (undated) DEVICE — SOLUTION SCRB 4OZ 10% POVIDONE IOD ANTIMIC BTL

## (undated) DEVICE — BASIC SINGLE BASIN 2-LF: Brand: MEDLINE INDUSTRIES, INC.

## (undated) DEVICE — SYRINGE MED 3ML CLR PLAS STD N CTRL LUERLOCK TIP DISP

## (undated) DEVICE — 3M™ IOBAN™ 2 ANTIMICROBIAL INCISE DRAPE 6650EZ: Brand: IOBAN™ 2

## (undated) DEVICE — PICO SINGLE USE NEGATIVE PRESSURE WOUND THERAPY SYSTEM 10CM X 20CM 4IN. X 8IN.: Brand: PICO

## (undated) DEVICE — PAD,ABDOMINAL,5"X9",STERILE,LF,1/PK: Brand: MEDLINE INDUSTRIES, INC.

## (undated) DEVICE — CHLORAPREP 26ML ORANGE

## (undated) DEVICE — SUTURE PERMAHAND SZ 4-0 L12X30IN NONABSORBABLE BLK SILK A303H

## (undated) DEVICE — STOCKINETTE IMPERV M 9X44IN POLY INNR LAYR COT ORTH EXT

## (undated) DEVICE — X-RAY DETECTABLE SPONGES,16 PLY: Brand: VISTEC

## (undated) DEVICE — SUTURE VCRL SZ 4-0 L18IN ABSRB UD L19MM PS-2 3/8 CIR PRIM J496H

## (undated) DEVICE — PENCIL ES L3M BTTN SWCH S STL HEX LOK BLDE ELECTRD HOLSTER

## (undated) DEVICE — SUTURE VCRL SZ 3-0 L27IN ABSRB UD L36MM CT-1 1/2 CIR J258H

## (undated) DEVICE — TOTAL TRAY, 16FR 10ML SIL FOLEY, URN: Brand: MEDLINE

## (undated) DEVICE — SOLUTION IV 1000ML 0.9% SOD CHL PH 5 INJ USP VIAFLX PLAS

## (undated) DEVICE — LOOP VES W25MM THK1MM MAXI RED SIL FLD REPELLENT 100 PER

## (undated) DEVICE — SUTURE BOOT: Brand: DEROYAL

## (undated) DEVICE — LOOP VES W1.3MM THK0.9MM MINI WHT SIL FLD REPELLENT

## (undated) DEVICE — GOWN SIRUS NONREIN XL W/TWL: Brand: MEDLINE INDUSTRIES, INC.

## (undated) DEVICE — AGENT HEMSTAT W4XL4IN OXIDIZED REGENERATED CELOS ABSRB SFT

## (undated) DEVICE — CLEANER ES TIP W2XL2IN ADH BK RADPQ FOR S STL ELECTRD

## (undated) DEVICE — T-DRAPE,EXTREMITY,STERILE: Brand: MEDLINE

## (undated) DEVICE — Z CONVERTED USE 2273164 BANDAGE COMPR W4INXL4 1/2YD E EC SGL LAYERED CLP CLSR ECONO

## (undated) DEVICE — SOLUTION PREP POVIDONE IOD FOR SKIN MUCOUS MEM PRIOR TO

## (undated) DEVICE — SYRINGE MED 20ML STD CLR PLAS LUERLOCK TIP N CTRL DISP

## (undated) DEVICE — ELECTRODE PT RET AD L9FT HI MOIST COND ADH HYDRGEL CORDED

## (undated) DEVICE — SPONGE LAP W18XL18IN WHT COT 4 PLY FLD STRUNG RADPQ DISP ST

## (undated) DEVICE — SHEET,DRAPE,53X77,STERILE: Brand: MEDLINE

## (undated) DEVICE — SET EXTN IV L30IN TBNG DIA0.1IN PRIMING 4ML MACBOR FEM ADPT

## (undated) DEVICE — SYRINGE MED 10ML LUERLOCK TIP W/O SFTY DISP

## (undated) DEVICE — INTENDED FOR TISSUE SEPARATION, AND OTHER PROCEDURES THAT REQUIRE A SHARP SURGICAL BLADE TO PUNCTURE OR CUT.: Brand: BARD-PARKER ® STAINLESS STEEL BLADES

## (undated) DEVICE — Device: Brand: MEDEX

## (undated) DEVICE — ESMARK: Brand: DEROYAL

## (undated) DEVICE — HANDPIECE SET WITH HIGH FLOW TIP AND SUCTION TUBE: Brand: INTERPULSE

## (undated) DEVICE — KIT OR ROOM TURNOVER W/STRAP

## (undated) DEVICE — SPONGE GZ W4XL4IN COT 12 PLY TYP VII WVN C FLD DSGN

## (undated) DEVICE — CURETTE SURG 5MM GRN S STL DERM SYMMETRICALLY GRND BVL EDGE

## (undated) DEVICE — SOLUTION IV IRRIG POUR BRL 0.9% SODIUM CHL 2F7124

## (undated) DEVICE — SUTURE PERMAHAND SZ 3-0 L18IN NONABSORBABLE BLK L26MM SH C013D

## (undated) DEVICE — SUTURE ABSORBABLE BRAIDED 2-0 CT-1 27 IN UD VICRYL J259H

## (undated) DEVICE — CLIP LIG M TI 6 SIL HNDL FOR OPN AND ENDOSCP SGL APPL

## (undated) DEVICE — SHEET, T, LAPAROTOMY, STERILE: Brand: MEDLINE

## (undated) DEVICE — DECANTER: Brand: UNBRANDED

## (undated) DEVICE — COVER LT HNDL BLU PLAS

## (undated) DEVICE — TOWEL,OR,DSP,ST,BLUE,STD,4/PK,20PK/CS: Brand: MEDLINE

## (undated) DEVICE — Z DISCONTINUED BY MEDLINE USE 2711682 TRAY SKIN PREP DRY W/ PREM GLV